# Patient Record
Sex: FEMALE | Race: WHITE | NOT HISPANIC OR LATINO | Employment: OTHER | URBAN - NONMETROPOLITAN AREA
[De-identification: names, ages, dates, MRNs, and addresses within clinical notes are randomized per-mention and may not be internally consistent; named-entity substitution may affect disease eponyms.]

---

## 2019-04-04 ENCOUNTER — PREP FOR SURGERY (OUTPATIENT)
Dept: OTHER | Facility: HOSPITAL | Age: 84
End: 2019-04-04

## 2019-04-04 DIAGNOSIS — H25.811 COMBINED FORM OF AGE-RELATED CATARACT, RIGHT EYE: Primary | ICD-10-CM

## 2019-04-04 RX ORDER — PREDNISOLONE ACETATE 10 MG/ML
1 SUSPENSION/ DROPS OPHTHALMIC
Status: CANCELLED | OUTPATIENT
Start: 2019-04-08

## 2019-04-04 RX ORDER — TETRACAINE HYDROCHLORIDE 5 MG/ML
1 SOLUTION OPHTHALMIC SEE ADMIN INSTRUCTIONS
Status: CANCELLED | OUTPATIENT
Start: 2019-04-08

## 2019-04-04 RX ORDER — ALPRAZOLAM 0.25 MG/1
0.25 TABLET ORAL
COMMUNITY

## 2019-04-04 RX ORDER — PRAVASTATIN SODIUM 20 MG
20 TABLET ORAL DAILY
COMMUNITY

## 2019-04-04 RX ORDER — POLYMYXIN B SULFATE AND TRIMETHOPRIM 1; 10000 MG/ML; [USP'U]/ML
1 SOLUTION OPHTHALMIC
Status: CANCELLED | OUTPATIENT
Start: 2019-04-08 | End: 2019-04-15

## 2019-04-04 RX ORDER — CYCLOPENTOLATE HYDROCHLORIDE 20 MG/ML
1 SOLUTION/ DROPS OPHTHALMIC
Status: CANCELLED | OUTPATIENT
Start: 2019-04-08 | End: 2019-04-08

## 2019-04-04 RX ORDER — METOPROLOL TARTRATE 100 MG/1
100 TABLET ORAL 2 TIMES DAILY
COMMUNITY

## 2019-04-04 RX ORDER — LIDOCAINE 4 G/G
1 PATCH TOPICAL DAILY PRN
COMMUNITY

## 2019-04-04 RX ORDER — OMEPRAZOLE 20 MG/1
20 CAPSULE, DELAYED RELEASE ORAL DAILY
COMMUNITY

## 2019-04-04 RX ORDER — CHOLECALCIFEROL (VITAMIN D3) 50 MCG
2000 TABLET ORAL DAILY
COMMUNITY

## 2019-04-04 RX ORDER — SERTRALINE HYDROCHLORIDE 25 MG/1
25 TABLET, FILM COATED ORAL DAILY
COMMUNITY
End: 2019-09-17 | Stop reason: DRUGHIGH

## 2019-04-04 RX ORDER — PHENYLEPHRINE HYDROCHLORIDE 100 MG/ML
1 SOLUTION/ DROPS OPHTHALMIC
Status: CANCELLED | OUTPATIENT
Start: 2019-04-08 | End: 2019-04-08

## 2019-04-04 RX ORDER — ASPIRIN 81 MG/1
81 TABLET ORAL DAILY
COMMUNITY

## 2019-04-04 RX ORDER — HYDROCODONE BITARTRATE AND ACETAMINOPHEN 5; 325 MG/1; MG/1
1 TABLET ORAL 2 TIMES DAILY
COMMUNITY

## 2019-04-08 ENCOUNTER — ANESTHESIA EVENT (OUTPATIENT)
Dept: PERIOP | Facility: HOSPITAL | Age: 84
End: 2019-04-08

## 2019-04-08 ENCOUNTER — HOSPITAL ENCOUNTER (OUTPATIENT)
Facility: HOSPITAL | Age: 84
Setting detail: HOSPITAL OUTPATIENT SURGERY
Discharge: HOME | End: 2019-04-08
Attending: OPHTHALMOLOGY | Admitting: OPHTHALMOLOGY

## 2019-04-08 ENCOUNTER — ANESTHESIA (OUTPATIENT)
Dept: PERIOP | Facility: HOSPITAL | Age: 84
End: 2019-04-08

## 2019-04-08 VITALS
HEIGHT: 63 IN | OXYGEN SATURATION: 98 % | HEART RATE: 66 BPM | TEMPERATURE: 98 F | RESPIRATION RATE: 20 BRPM | WEIGHT: 130 LBS | SYSTOLIC BLOOD PRESSURE: 132 MMHG | DIASTOLIC BLOOD PRESSURE: 88 MMHG | BODY MASS INDEX: 23.04 KG/M2

## 2019-04-08 DIAGNOSIS — H25.811 COMBINED FORM OF AGE-RELATED CATARACT, RIGHT EYE: ICD-10-CM

## 2019-04-08 PROCEDURE — 25010000002 EPINEPHRINE PER 0.1 MG: Performed by: OPHTHALMOLOGY

## 2019-04-08 PROCEDURE — V2632 POST CHMBR INTRAOCULAR LENS: HCPCS | Performed by: OPHTHALMOLOGY

## 2019-04-08 PROCEDURE — 25010000002 MIDAZOLAM PER 1 MG: Performed by: NURSE ANESTHETIST, CERTIFIED REGISTERED

## 2019-04-08 DEVICE — IMPLANTABLE DEVICE: Type: IMPLANTABLE DEVICE | Site: POSTERIOR CHAMBER | Status: FUNCTIONAL

## 2019-04-08 RX ORDER — TETRACAINE HYDROCHLORIDE 5 MG/ML
SOLUTION OPHTHALMIC AS NEEDED
Status: DISCONTINUED | OUTPATIENT
Start: 2019-04-08 | End: 2019-04-08 | Stop reason: HOSPADM

## 2019-04-08 RX ORDER — CYCLOPENTOLATE HYDROCHLORIDE 20 MG/ML
1 SOLUTION/ DROPS OPHTHALMIC
Status: COMPLETED | OUTPATIENT
Start: 2019-04-08 | End: 2019-04-08

## 2019-04-08 RX ORDER — PREDNISOLONE ACETATE 10 MG/ML
1 SUSPENSION/ DROPS OPHTHALMIC
Status: DISCONTINUED | OUTPATIENT
Start: 2019-04-08 | End: 2019-04-08

## 2019-04-08 RX ORDER — POLYMYXIN B SULFATE AND TRIMETHOPRIM 1; 10000 MG/ML; [USP'U]/ML
1 SOLUTION OPHTHALMIC
Status: DISCONTINUED | OUTPATIENT
Start: 2019-04-08 | End: 2019-04-08 | Stop reason: HOSPADM

## 2019-04-08 RX ORDER — SODIUM CHLORIDE 0.9 % (FLUSH) 0.9 %
3 SYRINGE (ML) INJECTION AS NEEDED
Status: DISCONTINUED | OUTPATIENT
Start: 2019-04-08 | End: 2019-04-08 | Stop reason: HOSPADM

## 2019-04-08 RX ORDER — MIDAZOLAM HYDROCHLORIDE 1 MG/ML
INJECTION INTRAMUSCULAR; INTRAVENOUS AS NEEDED
Status: DISCONTINUED | OUTPATIENT
Start: 2019-04-08 | End: 2019-04-08 | Stop reason: SURG

## 2019-04-08 RX ORDER — PHENYLEPHRINE HYDROCHLORIDE 100 MG/ML
1 SOLUTION/ DROPS OPHTHALMIC
Status: COMPLETED | OUTPATIENT
Start: 2019-04-08 | End: 2019-04-08

## 2019-04-08 RX ORDER — PREDNISOLONE ACETATE 10 MG/ML
1 SUSPENSION/ DROPS OPHTHALMIC
Status: DISCONTINUED | OUTPATIENT
Start: 2019-04-08 | End: 2019-04-08 | Stop reason: HOSPADM

## 2019-04-08 RX ORDER — TETRACAINE HYDROCHLORIDE 5 MG/ML
1 SOLUTION OPHTHALMIC SEE ADMIN INSTRUCTIONS
Status: COMPLETED | OUTPATIENT
Start: 2019-04-08 | End: 2019-04-08

## 2019-04-08 RX ORDER — SODIUM CHLORIDE, SODIUM LACTATE, POTASSIUM CHLORIDE, CALCIUM CHLORIDE 600; 310; 30; 20 MG/100ML; MG/100ML; MG/100ML; MG/100ML
1000 INJECTION, SOLUTION INTRAVENOUS CONTINUOUS
Status: DISCONTINUED | OUTPATIENT
Start: 2019-04-08 | End: 2019-04-08 | Stop reason: HOSPADM

## 2019-04-08 RX ORDER — BALANCED SALT SOLUTION ENRICHED WITH BICARBONATE, DEXTROSE, AND GLUTATHIONE
KIT INTRAOCULAR AS NEEDED
Status: DISCONTINUED | OUTPATIENT
Start: 2019-04-08 | End: 2019-04-08 | Stop reason: HOSPADM

## 2019-04-08 RX ORDER — KETAMINE HCL IN NACL, ISO-OSM 100MG/10ML
SYRINGE (ML) INJECTION AS NEEDED
Status: DISCONTINUED | OUTPATIENT
Start: 2019-04-08 | End: 2019-04-08 | Stop reason: SURG

## 2019-04-08 RX ORDER — POLYMYXIN B SULFATE AND TRIMETHOPRIM 1; 10000 MG/ML; [USP'U]/ML
SOLUTION OPHTHALMIC AS NEEDED
Status: DISCONTINUED | OUTPATIENT
Start: 2019-04-08 | End: 2019-04-08 | Stop reason: HOSPADM

## 2019-04-08 RX ORDER — PREDNISOLONE ACETATE 10 MG/ML
SUSPENSION/ DROPS OPHTHALMIC AS NEEDED
Status: DISCONTINUED | OUTPATIENT
Start: 2019-04-08 | End: 2019-04-08 | Stop reason: HOSPADM

## 2019-04-08 RX ADMIN — MIDAZOLAM HYDROCHLORIDE 0.5 MG: 1 INJECTION, SOLUTION INTRAMUSCULAR; INTRAVENOUS at 08:25

## 2019-04-08 RX ADMIN — PHENYLEPHRINE HYDROCHLORIDE 1 DROP: 100 SOLUTION/ DROPS OPHTHALMIC at 08:06

## 2019-04-08 RX ADMIN — PHENYLEPHRINE HYDROCHLORIDE 1 DROP: 100 SOLUTION/ DROPS OPHTHALMIC at 08:01

## 2019-04-08 RX ADMIN — CYCLOPENTOLATE HYDROCHLORIDE 1 DROP: 20 SOLUTION/ DROPS OPHTHALMIC at 08:01

## 2019-04-08 RX ADMIN — TETRACAINE HYDROCHLORIDE 1 DROP: 5 SOLUTION OPHTHALMIC at 07:55

## 2019-04-08 RX ADMIN — TETRACAINE HYDROCHLORIDE 1 DROP: 5 SOLUTION OPHTHALMIC at 07:54

## 2019-04-08 RX ADMIN — SODIUM CHLORIDE, POTASSIUM CHLORIDE, SODIUM LACTATE AND CALCIUM CHLORIDE 1000 ML: 600; 310; 30; 20 INJECTION, SOLUTION INTRAVENOUS at 08:01

## 2019-04-08 RX ADMIN — TETRACAINE HYDROCHLORIDE 1 DROP: 5 SOLUTION OPHTHALMIC at 07:53

## 2019-04-08 RX ADMIN — MIDAZOLAM HYDROCHLORIDE 0.5 MG: 1 INJECTION, SOLUTION INTRAMUSCULAR; INTRAVENOUS at 08:37

## 2019-04-08 RX ADMIN — PHENYLEPHRINE HYDROCHLORIDE 1 DROP: 100 SOLUTION/ DROPS OPHTHALMIC at 07:56

## 2019-04-08 RX ADMIN — CYCLOPENTOLATE HYDROCHLORIDE 1 DROP: 20 SOLUTION/ DROPS OPHTHALMIC at 08:06

## 2019-04-08 RX ADMIN — Medication 6 MG: at 08:28

## 2019-04-08 RX ADMIN — CYCLOPENTOLATE HYDROCHLORIDE 1 DROP: 20 SOLUTION/ DROPS OPHTHALMIC at 07:56

## 2019-04-08 RX ADMIN — MIDAZOLAM HYDROCHLORIDE 0.5 MG: 1 INJECTION, SOLUTION INTRAMUSCULAR; INTRAVENOUS at 08:10

## 2019-04-08 RX ADMIN — MIDAZOLAM HYDROCHLORIDE 0.5 MG: 1 INJECTION, SOLUTION INTRAMUSCULAR; INTRAVENOUS at 08:44

## 2019-04-08 NOTE — ANESTHESIA PREPROCEDURE EVALUATION
Anesthesia Evaluation     Patient summary reviewed and Nursing notes reviewed   no history of anesthetic complications:  NPO Solid Status: > 8 hours  NPO Liquid Status: > 8 hours           Airway   Mallampati: II  TM distance: >3 FB  Neck ROM: full  No difficulty expected  Dental    (+) lower dentures and upper dentures    Pulmonary    (+) COPD, sleep apnea, decreased breath sounds,   (-) not a smoker    ROS comment: o2 at night  Cardiovascular     (+) dysrhythmias Atrial Fib,       Neuro/Psych  (+) psychiatric history Anxiety and Depression,     GI/Hepatic/Renal/Endo    (+)  GERD,      Musculoskeletal     (+) arthralgias, back pain, chronic pain, myalgias,   Abdominal    Substance History      OB/GYN          Other   (+) arthritis     ROS/Med Hx Other: 02 at night                  Anesthesia Plan    ASA 3     MAC     intravenous induction   Anesthetic plan, all risks, benefits, and alternatives have been provided, discussed and informed consent has been obtained with: patient.

## 2019-04-08 NOTE — ANESTHESIA POSTPROCEDURE EVALUATION
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 [H25.813])    Surgeon:  Cindy Olsen MD Provider:  Forest Blanton CRNA    Anesthesia Type:  MAC ASA Status:  3          Anesthesia Type: MAC  Last vitals  BP   157/99 (04/08/19 0745)   Temp   98.1 °F (36.7 °C) (04/08/19 0745)   Pulse   74 (04/08/19 0745)   Resp   17 (04/08/19 0745)     SpO2   96 % (04/08/19 0745)     Post Anesthesia Care and Evaluation    Patient location during evaluation: PHASE II  Patient participation: complete - patient participated  Level of consciousness: awake  Pain score: 0  Pain management: adequate  Airway patency: patent  Anesthetic complications: No anesthetic complications  PONV Status: none  Cardiovascular status: acceptable  Respiratory status: acceptable  Hydration status: acceptable    Comments: vsss resp spont, reflexes intact, responsive, report given to pacu nurse

## 2019-06-17 ENCOUNTER — HOSPITAL ENCOUNTER (OUTPATIENT)
Facility: HOSPITAL | Age: 84
Setting detail: HOSPITAL OUTPATIENT SURGERY
Discharge: HOME | End: 2019-06-17
Attending: OPHTHALMOLOGY | Admitting: OPHTHALMOLOGY

## 2019-06-17 ENCOUNTER — ANESTHESIA EVENT (OUTPATIENT)
Dept: PERIOP | Facility: HOSPITAL | Age: 84
End: 2019-06-17

## 2019-06-17 ENCOUNTER — ANESTHESIA (OUTPATIENT)
Dept: PERIOP | Facility: HOSPITAL | Age: 84
End: 2019-06-17

## 2019-06-17 VITALS
OXYGEN SATURATION: 95 % | BODY MASS INDEX: 21 KG/M2 | RESPIRATION RATE: 18 BRPM | HEIGHT: 64 IN | WEIGHT: 123 LBS | HEART RATE: 78 BPM | DIASTOLIC BLOOD PRESSURE: 123 MMHG | SYSTOLIC BLOOD PRESSURE: 211 MMHG | TEMPERATURE: 97.9 F

## 2019-06-17 PROCEDURE — 25010000002 EPINEPHRINE PER 0.1 MG: Performed by: OPHTHALMOLOGY

## 2019-06-17 PROCEDURE — 25010000002 MIDAZOLAM PER 1 MG: Performed by: NURSE ANESTHETIST, CERTIFIED REGISTERED

## 2019-06-17 PROCEDURE — V2632 POST CHMBR INTRAOCULAR LENS: HCPCS | Performed by: OPHTHALMOLOGY

## 2019-06-17 DEVICE — IMPLANTABLE DEVICE: Type: IMPLANTABLE DEVICE | Site: POSTERIOR CHAMBER | Status: FUNCTIONAL

## 2019-06-17 RX ORDER — PREDNISOLONE ACETATE 10 MG/ML
SUSPENSION/ DROPS OPHTHALMIC AS NEEDED
Status: DISCONTINUED | OUTPATIENT
Start: 2019-06-17 | End: 2019-06-17 | Stop reason: HOSPADM

## 2019-06-17 RX ORDER — TETRACAINE HYDROCHLORIDE 5 MG/ML
SOLUTION OPHTHALMIC AS NEEDED
Status: DISCONTINUED | OUTPATIENT
Start: 2019-06-17 | End: 2019-06-17 | Stop reason: HOSPADM

## 2019-06-17 RX ORDER — MIDAZOLAM HYDROCHLORIDE 1 MG/ML
INJECTION INTRAMUSCULAR; INTRAVENOUS AS NEEDED
Status: DISCONTINUED | OUTPATIENT
Start: 2019-06-17 | End: 2019-06-17 | Stop reason: SURG

## 2019-06-17 RX ORDER — POLYMYXIN B SULFATE AND TRIMETHOPRIM 1; 10000 MG/ML; [USP'U]/ML
SOLUTION OPHTHALMIC AS NEEDED
Status: DISCONTINUED | OUTPATIENT
Start: 2019-06-17 | End: 2019-06-17 | Stop reason: HOSPADM

## 2019-06-17 RX ORDER — POLYMYXIN B SULFATE AND TRIMETHOPRIM 1; 10000 MG/ML; [USP'U]/ML
1 SOLUTION OPHTHALMIC
Status: DISCONTINUED | OUTPATIENT
Start: 2019-06-17 | End: 2019-06-17 | Stop reason: HOSPADM

## 2019-06-17 RX ORDER — PHENYLEPHRINE HYDROCHLORIDE 100 MG/ML
1 SOLUTION/ DROPS OPHTHALMIC
Status: COMPLETED | OUTPATIENT
Start: 2019-06-17 | End: 2019-06-17

## 2019-06-17 RX ORDER — TETRACAINE HYDROCHLORIDE 5 MG/ML
1 SOLUTION OPHTHALMIC SEE ADMIN INSTRUCTIONS
Status: COMPLETED | OUTPATIENT
Start: 2019-06-17 | End: 2019-06-17

## 2019-06-17 RX ORDER — PREDNISOLONE ACETATE 10 MG/ML
1 SUSPENSION/ DROPS OPHTHALMIC
Status: DISCONTINUED | OUTPATIENT
Start: 2019-06-17 | End: 2019-06-17 | Stop reason: HOSPADM

## 2019-06-17 RX ORDER — BALANCED SALT SOLUTION ENRICHED WITH BICARBONATE, DEXTROSE, AND GLUTATHIONE
KIT INTRAOCULAR AS NEEDED
Status: DISCONTINUED | OUTPATIENT
Start: 2019-06-17 | End: 2019-06-17 | Stop reason: HOSPADM

## 2019-06-17 RX ORDER — CYCLOPENTOLATE HYDROCHLORIDE 20 MG/ML
1 SOLUTION/ DROPS OPHTHALMIC
Status: COMPLETED | OUTPATIENT
Start: 2019-06-17 | End: 2019-06-17

## 2019-06-17 RX ORDER — SODIUM CHLORIDE 0.9 % (FLUSH) 0.9 %
3 SYRINGE (ML) INJECTION AS NEEDED
Status: DISCONTINUED | OUTPATIENT
Start: 2019-06-17 | End: 2019-06-17 | Stop reason: HOSPADM

## 2019-06-17 RX ORDER — SODIUM CHLORIDE, SODIUM LACTATE, POTASSIUM CHLORIDE, CALCIUM CHLORIDE 600; 310; 30; 20 MG/100ML; MG/100ML; MG/100ML; MG/100ML
1000 INJECTION, SOLUTION INTRAVENOUS CONTINUOUS
Status: DISCONTINUED | OUTPATIENT
Start: 2019-06-17 | End: 2019-06-17 | Stop reason: HOSPADM

## 2019-06-17 RX ORDER — POLYMYXIN B SULFATE AND TRIMETHOPRIM 1; 10000 MG/ML; [USP'U]/ML
1 SOLUTION OPHTHALMIC
Status: DISCONTINUED | OUTPATIENT
Start: 2019-06-17 | End: 2019-06-17

## 2019-06-17 RX ADMIN — TETRACAINE HYDROCHLORIDE 1 DROP: 5 SOLUTION OPHTHALMIC at 09:21

## 2019-06-17 RX ADMIN — TETRACAINE HYDROCHLORIDE 1 DROP: 5 SOLUTION OPHTHALMIC at 09:19

## 2019-06-17 RX ADMIN — SODIUM CHLORIDE, POTASSIUM CHLORIDE, SODIUM LACTATE AND CALCIUM CHLORIDE 1000 ML: 600; 310; 30; 20 INJECTION, SOLUTION INTRAVENOUS at 09:32

## 2019-06-17 RX ADMIN — TETRACAINE HYDROCHLORIDE 1 DROP: 5 SOLUTION OPHTHALMIC at 09:20

## 2019-06-17 RX ADMIN — MIDAZOLAM HYDROCHLORIDE 2 MG: 1 INJECTION, SOLUTION INTRAMUSCULAR; INTRAVENOUS at 10:12

## 2019-06-17 RX ADMIN — PHENYLEPHRINE HYDROCHLORIDE 1 DROP: 100 SOLUTION/ DROPS OPHTHALMIC at 09:27

## 2019-06-17 RX ADMIN — CYCLOPENTOLATE HYDROCHLORIDE 1 DROP: 20 SOLUTION/ DROPS OPHTHALMIC at 09:27

## 2019-06-17 RX ADMIN — CYCLOPENTOLATE HYDROCHLORIDE 1 DROP: 20 SOLUTION/ DROPS OPHTHALMIC at 09:22

## 2019-06-17 RX ADMIN — CYCLOPENTOLATE HYDROCHLORIDE 1 DROP: 20 SOLUTION/ DROPS OPHTHALMIC at 09:32

## 2019-06-17 RX ADMIN — PHENYLEPHRINE HYDROCHLORIDE 1 DROP: 100 SOLUTION/ DROPS OPHTHALMIC at 09:32

## 2019-06-17 RX ADMIN — PHENYLEPHRINE HYDROCHLORIDE 1 DROP: 100 SOLUTION/ DROPS OPHTHALMIC at 09:22

## 2019-06-17 NOTE — DISCHARGE INSTRUCTIONS
Please use each eye drop every 4 hours , one drop to the eye.  Start eye drops when you get home today.  Wait 5 minutes between drops.  It does not matter which eye drop goes first.    You do not have to get up at night time to put drops in the eye.  Continue drops in the morning every 4 hours until your appointment with Dr. Olsen tomorrow.  Bring the blue eye case and folder with you to your appointment.    Keep the shield on all day and night for the first 24 hours.  Do not rub or touch eye.  Wear the shield at bedtime for the first week.        Please follow all post op instructions and follow up appointment time from your physician's office included in your discharge packet.  .   No pushing, pulling, tugging,  heavy lifting, or strenuous activity.  No major decision making, driving, or drinking alcoholic beverages for 24 hours. ( due to the medications you have  received)  Always use good hand hygiene/washing techniques.  NO driving while taking pain medications.    To assist you in voiding:  Drink plenty of fluids  Listen to running water while attempting to void.    If you are unable to urinate and you have an uncomfortable urge to void or it has been   6 hours since you were discharged, return to the Emergency Room

## 2019-06-17 NOTE — OP NOTE
PROCEDURE NOTE      Date of Procedure: 6/17/2019  Patient Name:  Tunde Barrow  MRN: [id number]  YOB: 1931      PREOPERATIVE DIAGNOSIS:  Visually significant combined form of senile cataract of the Left eye interfering with activities of daily living.    INDICATIONS FOR THE PROCEDURE:  Visually significant combined form of senile cataract of the Left eye interfering with activities of daily living.    POSTOPERATIVE DIAGNOSIS:  Visually significant combined form of senile cataract of the Left eye interfering with activities of daily living.    SURGEON:  Cindy Olsen M.D.    NAME OF PROCEDURE:  Left cataract extraction with posterior chamber intraocular lens implant. Lens used: +24.5D MN60AC  CDE: 9.71    ANESTHESIA:  Topical with MAC.    COMPLICATIONS:  None.    DETAILS OF THE PROCEDURE:  After receiving appropriate informed consent and confirming and marking the eye to be operated upon, the patient was wheeled into the operating room. After confirming adequate anesthesia, the patient was prepped and draped in a standard sterile ophthalmic fashion. A lid speculum was then placed in the eye.  A 0.8 mm metal blade was then used to make two limbal paracenteses and the anterior chamber was reformed with Viscoat.  A 2.4 mm metal keratome was then used to make a temporal clear cornea tunneled incision.  A cystotome was used to puncture the anterior capsule and initiate a capsular flap.  Utrata forceps were used to complete a continuous curvilinear capsulorrhexis.  BSS on a Tavarez hydrodissection cannula was then used to hydrodissect and hydrodelineate the nucleus.  The nucleus was then phacoemulsified using a divide and conquer technique secondary to extensive eye movement.  CDE was 9.71%.  Remaining cortex was removed with bimanual I/A.  Posterior capsular polish was performed.  The capsular bag was then reformed with Healon-GV and a +24.5 D MN60AC lens implant, was then placed in the bag without event.  The  Healon-GV was then removed with bimanual irrigation and aspiration and the anterior chamber reformed with BSS.  The wounds were hydrated as necessary to maintain a water tight anterior chamber. Intracameral Moxifloxacin 1mg/0.1ml was administered and 5% povidone iodine solution was placed on the ocular surface. At the conclusion of the procedure, the lid speculum was removed and the patient undraped.  A drop of Polytrim and Pred Forte 1% and shield were placed over the eye.  The patient left the room in good condition having tolerated the procedure well.    Cindy Olsen MD

## 2019-06-17 NOTE — NURSING NOTE
"1108 late entry.  Pt has elevated BP both pre operatively and post operatively.  Patient denies s/s of HTN. Pt instructed to return to ER if ringing in the ears, dizziness, visual disturbances or redness of face occur.  Pt v/u.  Patient reports blood pressure will probably \"settle down\" when leg cramps lessen. Pt repositioned for comfort.  "

## 2019-06-17 NOTE — ANESTHESIA PREPROCEDURE EVALUATION
Anesthesia Evaluation     Patient summary reviewed and Nursing notes reviewed   no history of anesthetic complications:  NPO Solid Status: > 8 hours  NPO Liquid Status: > 8 hours           Airway   Mallampati: II  TM distance: >3 FB  Neck ROM: full  No difficulty expected  Dental    (+) lower dentures and upper dentures    Pulmonary    (+) COPD, sleep apnea, decreased breath sounds,   (-) not a smoker    ROS comment: o2 at night  Cardiovascular   Exercise tolerance: good (4-7 METS)    ECG reviewed  PT is on anticoagulation therapy  Patient on routine beta blocker and Beta blocker given within 24 hours of surgery    (+) dysrhythmias Atrial Fib,     ROS comment: ECG 3/2014 SR with 1st degree AV block    Neuro/Psych  (+) psychiatric history Anxiety and Depression,     GI/Hepatic/Renal/Endo    (+)  GERD,      Musculoskeletal     (+) arthralgias, back pain, chronic pain, myalgias,   Abdominal    Substance History - negative use     OB/GYN negative ob/gyn ROS   (-)  Pregnant    Comment: Hysterectomy      Other   (+) arthritis     ROS/Med Hx Other: 02 at night    Pt on ELIQUIS AND ASA 81 mg last dose 6/16/2019 at 2200                  Anesthesia Plan    ASA 3     MAC   (Pt advised that intravenous sedation will be used as primary anesthetic technique, along with topical anesthesia. Every effort will be made to make sure patient is comfortable.     The patient was told that they may experience recall for the procedure. Pt verbalized understanding and agrees to plan of care.)  intravenous induction   Anesthetic plan, all risks, benefits, and alternatives have been provided, discussed and informed consent has been obtained with: patient.    Plan discussed with CRNA.

## 2019-06-17 NOTE — ANESTHESIA POSTPROCEDURE EVALUATION
Patient: Tunde Barrow    Procedure Summary     Date:  06/17/19 Room / Location:  [facility] OR  / [facility] OR    Anesthesia Start:  0955 Anesthesia Stop:  1022    Procedure:  CATARACT PHACO EXTRACTION WITH INTRAOCULAR LENS IMPLANT LEFT EYE (Left Eye) Diagnosis:       Combined forms of age-related cataract of left eye      (Combined forms of age-related cataract of left eye [H25.812])    Surgeon:  Cindy Olsen MD Provider:  Andrea Burks CRNA    Anesthesia Type:  MAC ASA Status:  3          Anesthesia Type: MAC  Last vitals  BP   (!) 211/123 (06/17/19 1058)   Temp   97.9 °F (36.6 °C) (06/17/19 1028)   Pulse   78 (06/17/19 1058)   Resp   18 (06/17/19 1058)     SpO2   95 % (06/17/19 1058)     Post Anesthesia Care and Evaluation    Patient location during evaluation: bedside  Patient participation: complete - patient participated  Level of consciousness: awake  Pain score: 0  Pain management: adequate  Airway patency: patent  Anesthetic complications: No anesthetic complications  PONV Status: controlled  Cardiovascular status: acceptable and stable  Respiratory status: acceptable and room air  Hydration status: acceptable

## 2019-09-17 ENCOUNTER — OFFICE VISIT (OUTPATIENT)
Dept: NEUROSURGERY | Facility: CLINIC | Age: 84
End: 2019-09-17

## 2019-09-17 VITALS — HEIGHT: 64 IN | BODY MASS INDEX: 21 KG/M2 | WEIGHT: 123 LBS

## 2019-09-17 DIAGNOSIS — M47.816 FACET ARTHRITIS OF LUMBAR REGION: ICD-10-CM

## 2019-09-17 DIAGNOSIS — M51.36 DDD (DEGENERATIVE DISC DISEASE), LUMBAR: ICD-10-CM

## 2019-09-17 DIAGNOSIS — M41.20 SCOLIOSIS (AND KYPHOSCOLIOSIS), IDIOPATHIC: Primary | ICD-10-CM

## 2019-09-17 PROBLEM — M48.062 SPINAL STENOSIS, LUMBAR REGION, WITH NEUROGENIC CLAUDICATION: Status: ACTIVE | Noted: 2019-09-17

## 2019-09-17 PROCEDURE — 99203 OFFICE O/P NEW LOW 30 MIN: CPT | Performed by: NEUROLOGICAL SURGERY

## 2019-09-17 NOTE — PROGRESS NOTES
Subjective   Patient ID: Tunde Barrow is a 88 y.o. female is being seen for consultation today at the request of Lorna Hagen*  Chief Complaint: Back pain     History of Present Illness: The patient is an 88-year-old woman from [place] who lives by herself with her daughter nearby to help as needed and is bothered by a chronic back pain syndrome that has progressively worsened over years of time.  It now bothers her particularly when she has to stand such as doing dishes at his sink, and is relieved by sitting.  She has had no special treatment for the symptoms.  She past history of heart disease and kidney disease still porosis and arthritis.  She has had only one surgery in the past which was a hysterectomy.  She is .  Her daughter is with her today.    Review of Radiographic Studies:  Lumbar MRI scan dated 8/28/2019 shows degenerative lumbar scoliosis, with lumbar stenosis moderately severe at L4-5, moderate at L3-4, and mild at L2-3, mostly with a right-sided lateral recess stenosis at that L2-3 level.    The following portions of the patient's history were reviewed, updated as appropriate and approved: allergies, current medications, past family history, past medical history, past social history, past surgical history, review of systems and problem list.    Review of Systems   Constitutional: Negative for activity change, appetite change, chills, diaphoresis, fatigue, fever and unexpected weight change.   HENT: Negative for congestion, dental problem, drooling, ear discharge, ear pain, facial swelling, hearing loss, mouth sores, nosebleeds, postnasal drip, rhinorrhea, sinus pressure, sneezing, sore throat, tinnitus, trouble swallowing and voice change.    Eyes: Negative for photophobia, pain, discharge, redness, itching and visual disturbance.   Respiratory: Negative for apnea, cough, choking, chest tightness, shortness of breath, wheezing and stridor.    Cardiovascular: Negative for chest  pain, palpitations and leg swelling.   Gastrointestinal: Negative for abdominal distention, abdominal pain, anal bleeding, blood in stool, constipation, diarrhea, nausea, rectal pain and vomiting.   Endocrine: Negative for cold intolerance, heat intolerance, polydipsia, polyphagia and polyuria.   Genitourinary: Negative for decreased urine volume, difficulty urinating, dysuria, enuresis, flank pain, frequency, genital sores, hematuria and urgency.   Musculoskeletal: Positive for back pain and neck pain. Negative for arthralgias, gait problem, joint swelling, myalgias and neck stiffness.   Skin: Negative for color change, pallor, rash and wound.   Allergic/Immunologic: Negative for environmental allergies, food allergies and immunocompromised state.   Neurological: Positive for numbness and headaches. Negative for dizziness, tremors, seizures, syncope, facial asymmetry, speech difficulty, weakness and light-headedness.   Hematological: Negative for adenopathy. Does not bruise/bleed easily.   Psychiatric/Behavioral: Negative for agitation, behavioral problems, confusion, decreased concentration, dysphoric mood, hallucinations, self-injury, sleep disturbance and suicidal ideas. The patient is not nervous/anxious and is not hyperactive.        Objective     NEUROLOGICAL EXAMINATION:      MENTAL STATUS:  Alert and oriented.  Speech intact.  Recent and remote memory intact.      CRANIAL NERVES:  Cranial nerve II:  Visual fields are full.  Cranial nerves III, IV and VI:  PERRLADC.  Extraocular movements are intact.  Nystagmus is not present.  Cranial nerve V:  Facial sensation is intact.  Cranial nerve VII:  Muscles of facial expression reveal no asymmetry.  Cranial nerve VIII:  Hearing is intact.  Cranial nerves IX and X:  Palate elevates symmetrically.  Cranial nerve XI:  Shoulder shrug is intact.  Cranial nerve XII:  Tongue is midline without evidence of atrophy or fasciculation.    MUSCULOSKELETAL:  SLR negative  bilaterally    MOTOR: Intact knee extension, ankle dorsiflexion, and plantar flexion    SENSATION: Intact to touch over the lower extremities.    REFLEXES:  DTR 2+ both knees and both ankles.      Assessment   Lumbar stenosis moderately severe L4-5, moderate L3-4, mild with lateral recess on the right at L2-3.       Plan   Physical therapy in [place].  Schedule epidural steroid injection in [place] with Dr. Aguiar.  Follow-up in [place] in 2 months.       Balaji Irvin MD

## 2020-01-01 ENCOUNTER — APPOINTMENT (OUTPATIENT)
Dept: GENERAL RADIOLOGY | Facility: HOSPITAL | Age: 85
End: 2020-01-01

## 2020-01-01 ENCOUNTER — ANESTHESIA EVENT (OUTPATIENT)
Dept: PERIOP | Facility: HOSPITAL | Age: 85
End: 2020-01-01

## 2020-01-01 ENCOUNTER — TELEPHONE (OUTPATIENT)
Dept: SURGERY | Facility: CLINIC | Age: 85
End: 2020-01-01

## 2020-01-01 ENCOUNTER — ANESTHESIA (OUTPATIENT)
Dept: PERIOP | Facility: HOSPITAL | Age: 85
End: 2020-01-01

## 2020-01-01 ENCOUNTER — APPOINTMENT (OUTPATIENT)
Dept: ULTRASOUND IMAGING | Facility: HOSPITAL | Age: 85
End: 2020-01-01

## 2020-01-01 ENCOUNTER — APPOINTMENT (OUTPATIENT)
Dept: CT IMAGING | Facility: HOSPITAL | Age: 85
End: 2020-01-01

## 2020-01-01 ENCOUNTER — HOSPITAL ENCOUNTER (INPATIENT)
Facility: HOSPITAL | Age: 85
LOS: 11 days | End: 2020-11-15
Attending: EMERGENCY MEDICINE | Admitting: INTERNAL MEDICINE

## 2020-01-01 VITALS
HEIGHT: 65 IN | BODY MASS INDEX: 25.09 KG/M2 | OXYGEN SATURATION: 88 % | DIASTOLIC BLOOD PRESSURE: 56 MMHG | WEIGHT: 150.57 LBS | SYSTOLIC BLOOD PRESSURE: 97 MMHG | RESPIRATION RATE: 23 BRPM | TEMPERATURE: 98.8 F

## 2020-01-01 DIAGNOSIS — E87.20 LACTIC ACIDOSIS: ICD-10-CM

## 2020-01-01 DIAGNOSIS — K56.609 BOWEL OBSTRUCTION (HCC): ICD-10-CM

## 2020-01-01 DIAGNOSIS — K56.609 SMALL BOWEL OBSTRUCTION (HCC): Primary | ICD-10-CM

## 2020-01-01 DIAGNOSIS — N28.9 RENAL INSUFFICIENCY: ICD-10-CM

## 2020-01-01 LAB
ABO GROUP BLD: NORMAL
ALBUMIN SERPL-MCNC: 2.7 G/DL (ref 3.5–5.2)
ALBUMIN SERPL-MCNC: 2.8 G/DL (ref 3.5–5.2)
ALBUMIN SERPL-MCNC: 3 G/DL (ref 3.5–5.2)
ALBUMIN SERPL-MCNC: 4.4 G/DL (ref 3.5–5.2)
ALBUMIN/GLOB SERPL: 1 G/DL
ALBUMIN/GLOB SERPL: 1.1 G/DL
ALBUMIN/GLOB SERPL: 1.2 G/DL
ALBUMIN/GLOB SERPL: 1.4 G/DL
ALP SERPL-CCNC: 109 U/L (ref 39–117)
ALP SERPL-CCNC: 121 U/L (ref 39–117)
ALP SERPL-CCNC: 138 U/L (ref 39–117)
ALP SERPL-CCNC: 94 U/L (ref 39–117)
ALT SERPL W P-5'-P-CCNC: 12 U/L (ref 1–33)
ALT SERPL W P-5'-P-CCNC: 13 U/L (ref 1–33)
ALT SERPL W P-5'-P-CCNC: 14 U/L (ref 1–33)
ALT SERPL W P-5'-P-CCNC: 15 U/L (ref 1–33)
ANION GAP SERPL CALCULATED.3IONS-SCNC: 10.4 MMOL/L (ref 5–15)
ANION GAP SERPL CALCULATED.3IONS-SCNC: 11.2 MMOL/L (ref 5–15)
ANION GAP SERPL CALCULATED.3IONS-SCNC: 11.2 MMOL/L (ref 5–15)
ANION GAP SERPL CALCULATED.3IONS-SCNC: 11.7 MMOL/L (ref 5–15)
ANION GAP SERPL CALCULATED.3IONS-SCNC: 13 MMOL/L (ref 5–15)
ANION GAP SERPL CALCULATED.3IONS-SCNC: 13.7 MMOL/L (ref 5–15)
ANION GAP SERPL CALCULATED.3IONS-SCNC: 14.7 MMOL/L (ref 5–15)
ANION GAP SERPL CALCULATED.3IONS-SCNC: 18.8 MMOL/L (ref 5–15)
ANION GAP SERPL CALCULATED.3IONS-SCNC: 8 MMOL/L (ref 5–15)
ANION GAP SERPL CALCULATED.3IONS-SCNC: 8.3 MMOL/L (ref 5–15)
ANION GAP SERPL CALCULATED.3IONS-SCNC: 9 MMOL/L (ref 5–15)
AST SERPL-CCNC: 18 U/L (ref 1–32)
AST SERPL-CCNC: 19 U/L (ref 1–32)
AST SERPL-CCNC: 21 U/L (ref 1–32)
AST SERPL-CCNC: 23 U/L (ref 1–32)
BACTERIA UR QL AUTO: ABNORMAL /HPF
BASOPHILS # BLD AUTO: 0.02 10*3/MM3 (ref 0–0.2)
BASOPHILS # BLD AUTO: 0.03 10*3/MM3 (ref 0–0.2)
BASOPHILS # BLD AUTO: 0.04 10*3/MM3 (ref 0–0.2)
BASOPHILS # BLD AUTO: 0.06 10*3/MM3 (ref 0–0.2)
BASOPHILS # BLD AUTO: 0.08 10*3/MM3 (ref 0–0.2)
BASOPHILS NFR BLD AUTO: 0.2 % (ref 0–1.5)
BASOPHILS NFR BLD AUTO: 0.5 % (ref 0–1.5)
BASOPHILS NFR BLD AUTO: 0.5 % (ref 0–1.5)
BH BB BLOOD EXPIRATION DATE: NORMAL
BH BB BLOOD EXPIRATION DATE: NORMAL
BH BB BLOOD TYPE BARCODE: 5100
BH BB BLOOD TYPE BARCODE: 5100
BH BB DISPENSE STATUS: NORMAL
BH BB DISPENSE STATUS: NORMAL
BH BB PRODUCT CODE: NORMAL
BH BB PRODUCT CODE: NORMAL
BH BB UNIT NUMBER: NORMAL
BH BB UNIT NUMBER: NORMAL
BILIRUB SERPL-MCNC: 0.6 MG/DL (ref 0–1.2)
BILIRUB SERPL-MCNC: 0.7 MG/DL (ref 0–1.2)
BILIRUB SERPL-MCNC: 0.7 MG/DL (ref 0–1.2)
BILIRUB SERPL-MCNC: 1 MG/DL (ref 0–1.2)
BILIRUB UR QL STRIP: NEGATIVE
BLD GP AB SCN SERPL QL: NEGATIVE
BUN SERPL-MCNC: 37 MG/DL (ref 8–23)
BUN SERPL-MCNC: 38 MG/DL (ref 8–23)
BUN SERPL-MCNC: 39 MG/DL (ref 8–23)
BUN SERPL-MCNC: 41 MG/DL (ref 8–23)
BUN SERPL-MCNC: 44 MG/DL (ref 8–23)
BUN SERPL-MCNC: 45 MG/DL (ref 8–23)
BUN SERPL-MCNC: 46 MG/DL (ref 8–23)
BUN SERPL-MCNC: 47 MG/DL (ref 8–23)
BUN SERPL-MCNC: 48 MG/DL (ref 8–23)
BUN SERPL-MCNC: 50 MG/DL (ref 8–23)
BUN SERPL-MCNC: 58 MG/DL (ref 8–23)
BUN/CREAT SERPL: 22.6 (ref 7–25)
BUN/CREAT SERPL: 22.7 (ref 7–25)
BUN/CREAT SERPL: 27.6 (ref 7–25)
BUN/CREAT SERPL: 29.3 (ref 7–25)
BUN/CREAT SERPL: 32.3 (ref 7–25)
BUN/CREAT SERPL: 41.2 (ref 7–25)
BUN/CREAT SERPL: 43.7 (ref 7–25)
BUN/CREAT SERPL: 45.7 (ref 7–25)
BUN/CREAT SERPL: 50 (ref 7–25)
BUN/CREAT SERPL: 50.6 (ref 7–25)
BUN/CREAT SERPL: 51.3 (ref 7–25)
CALCIUM SPEC-SCNC: 10.1 MG/DL (ref 8.6–10.5)
CALCIUM SPEC-SCNC: 10.3 MG/DL (ref 8.6–10.5)
CALCIUM SPEC-SCNC: 10.5 MG/DL (ref 8.6–10.5)
CALCIUM SPEC-SCNC: 8.4 MG/DL (ref 8.6–10.5)
CALCIUM SPEC-SCNC: 8.6 MG/DL (ref 8.6–10.5)
CALCIUM SPEC-SCNC: 9.1 MG/DL (ref 8.6–10.5)
CALCIUM SPEC-SCNC: 9.8 MG/DL (ref 8.6–10.5)
CALCIUM SPEC-SCNC: 9.9 MG/DL (ref 8.6–10.5)
CHLORIDE SERPL-SCNC: 101 MMOL/L (ref 98–107)
CHLORIDE SERPL-SCNC: 102 MMOL/L (ref 98–107)
CHLORIDE SERPL-SCNC: 102 MMOL/L (ref 98–107)
CHLORIDE SERPL-SCNC: 103 MMOL/L (ref 98–107)
CHLORIDE SERPL-SCNC: 104 MMOL/L (ref 98–107)
CHLORIDE SERPL-SCNC: 105 MMOL/L (ref 98–107)
CHLORIDE SERPL-SCNC: 105 MMOL/L (ref 98–107)
CHLORIDE SERPL-SCNC: 96 MMOL/L (ref 98–107)
CHLORIDE SERPL-SCNC: 99 MMOL/L (ref 98–107)
CLARITY UR: ABNORMAL
CO2 SERPL-SCNC: 19.2 MMOL/L (ref 22–29)
CO2 SERPL-SCNC: 20.6 MMOL/L (ref 22–29)
CO2 SERPL-SCNC: 21.8 MMOL/L (ref 22–29)
CO2 SERPL-SCNC: 22 MMOL/L (ref 22–29)
CO2 SERPL-SCNC: 22 MMOL/L (ref 22–29)
CO2 SERPL-SCNC: 22.3 MMOL/L (ref 22–29)
CO2 SERPL-SCNC: 22.7 MMOL/L (ref 22–29)
CO2 SERPL-SCNC: 23 MMOL/L (ref 22–29)
CO2 SERPL-SCNC: 23.8 MMOL/L (ref 22–29)
CO2 SERPL-SCNC: 26.3 MMOL/L (ref 22–29)
CO2 SERPL-SCNC: 26.3 MMOL/L (ref 22–29)
COLOR UR: YELLOW
CREAT SERPL-MCNC: 0.76 MG/DL (ref 0.57–1)
CREAT SERPL-MCNC: 0.8 MG/DL (ref 0.57–1)
CREAT SERPL-MCNC: 0.87 MG/DL (ref 0.57–1)
CREAT SERPL-MCNC: 1.03 MG/DL (ref 0.57–1)
CREAT SERPL-MCNC: 1.05 MG/DL (ref 0.57–1)
CREAT SERPL-MCNC: 1.14 MG/DL (ref 0.57–1)
CREAT SERPL-MCNC: 1.33 MG/DL (ref 0.57–1)
CREAT SERPL-MCNC: 1.34 MG/DL (ref 0.57–1)
CREAT SERPL-MCNC: 1.55 MG/DL (ref 0.57–1)
CREAT SERPL-MCNC: 2.03 MG/DL (ref 0.57–1)
CREAT SERPL-MCNC: 2.57 MG/DL (ref 0.57–1)
CROSSMATCH INTERPRETATION: NORMAL
CROSSMATCH INTERPRETATION: NORMAL
D-LACTATE SERPL-SCNC: 2.3 MMOL/L (ref 0.5–2)
D-LACTATE SERPL-SCNC: 2.5 MMOL/L (ref 0.5–2)
DEPRECATED RDW RBC AUTO: 43.8 FL (ref 37–54)
DEPRECATED RDW RBC AUTO: 44.4 FL (ref 37–54)
DEPRECATED RDW RBC AUTO: 44.7 FL (ref 37–54)
DEPRECATED RDW RBC AUTO: 45.2 FL (ref 37–54)
DEPRECATED RDW RBC AUTO: 45.4 FL (ref 37–54)
DEPRECATED RDW RBC AUTO: 45.4 FL (ref 37–54)
DEPRECATED RDW RBC AUTO: 46 FL (ref 37–54)
DEPRECATED RDW RBC AUTO: 46.1 FL (ref 37–54)
DEPRECATED RDW RBC AUTO: 46.8 FL (ref 37–54)
EOSINOPHIL # BLD AUTO: 0 10*3/MM3 (ref 0–0.4)
EOSINOPHIL # BLD AUTO: 0 10*3/MM3 (ref 0–0.4)
EOSINOPHIL # BLD AUTO: 0.01 10*3/MM3 (ref 0–0.4)
EOSINOPHIL # BLD AUTO: 0.04 10*3/MM3 (ref 0–0.4)
EOSINOPHIL # BLD AUTO: 0.2 10*3/MM3 (ref 0–0.4)
EOSINOPHIL # BLD MANUAL: 0.18 10*3/MM3 (ref 0–0.4)
EOSINOPHIL NFR BLD AUTO: 0 % (ref 0.3–6.2)
EOSINOPHIL NFR BLD AUTO: 0 % (ref 0.3–6.2)
EOSINOPHIL NFR BLD AUTO: 0.1 % (ref 0.3–6.2)
EOSINOPHIL NFR BLD AUTO: 0.3 % (ref 0.3–6.2)
EOSINOPHIL NFR BLD AUTO: 1.3 % (ref 0.3–6.2)
EOSINOPHIL NFR BLD MANUAL: 1 % (ref 0.3–6.2)
ERYTHROCYTE [DISTWIDTH] IN BLOOD BY AUTOMATED COUNT: 13 % (ref 12.3–15.4)
ERYTHROCYTE [DISTWIDTH] IN BLOOD BY AUTOMATED COUNT: 13.1 % (ref 12.3–15.4)
ERYTHROCYTE [DISTWIDTH] IN BLOOD BY AUTOMATED COUNT: 13.2 % (ref 12.3–15.4)
ERYTHROCYTE [DISTWIDTH] IN BLOOD BY AUTOMATED COUNT: 13.4 % (ref 12.3–15.4)
ERYTHROCYTE [DISTWIDTH] IN BLOOD BY AUTOMATED COUNT: 13.4 % (ref 12.3–15.4)
ERYTHROCYTE [DISTWIDTH] IN BLOOD BY AUTOMATED COUNT: 13.7 % (ref 12.3–15.4)
ERYTHROCYTE [DISTWIDTH] IN BLOOD BY AUTOMATED COUNT: 13.7 % (ref 12.3–15.4)
GFR SERPL CREATININE-BSD FRML MDRD: 18 ML/MIN/1.73
GFR SERPL CREATININE-BSD FRML MDRD: 23 ML/MIN/1.73
GFR SERPL CREATININE-BSD FRML MDRD: 31 ML/MIN/1.73
GFR SERPL CREATININE-BSD FRML MDRD: 37 ML/MIN/1.73
GFR SERPL CREATININE-BSD FRML MDRD: 38 ML/MIN/1.73
GFR SERPL CREATININE-BSD FRML MDRD: 45 ML/MIN/1.73
GFR SERPL CREATININE-BSD FRML MDRD: 49 ML/MIN/1.73
GFR SERPL CREATININE-BSD FRML MDRD: 50 ML/MIN/1.73
GFR SERPL CREATININE-BSD FRML MDRD: 61 ML/MIN/1.73
GFR SERPL CREATININE-BSD FRML MDRD: 68 ML/MIN/1.73
GFR SERPL CREATININE-BSD FRML MDRD: 72 ML/MIN/1.73
GLOBULIN UR ELPH-MCNC: 2.5 GM/DL
GLOBULIN UR ELPH-MCNC: 2.5 GM/DL
GLOBULIN UR ELPH-MCNC: 2.6 GM/DL
GLOBULIN UR ELPH-MCNC: 3.1 GM/DL
GLUCOSE BLDC GLUCOMTR-MCNC: 101 MG/DL (ref 70–130)
GLUCOSE BLDC GLUCOMTR-MCNC: 109 MG/DL (ref 70–130)
GLUCOSE BLDC GLUCOMTR-MCNC: 118 MG/DL (ref 70–130)
GLUCOSE BLDC GLUCOMTR-MCNC: 119 MG/DL (ref 70–130)
GLUCOSE BLDC GLUCOMTR-MCNC: 125 MG/DL (ref 70–130)
GLUCOSE BLDC GLUCOMTR-MCNC: 128 MG/DL (ref 70–130)
GLUCOSE BLDC GLUCOMTR-MCNC: 131 MG/DL (ref 70–130)
GLUCOSE BLDC GLUCOMTR-MCNC: 132 MG/DL (ref 70–130)
GLUCOSE BLDC GLUCOMTR-MCNC: 134 MG/DL (ref 70–130)
GLUCOSE BLDC GLUCOMTR-MCNC: 137 MG/DL (ref 70–130)
GLUCOSE BLDC GLUCOMTR-MCNC: 138 MG/DL (ref 70–130)
GLUCOSE BLDC GLUCOMTR-MCNC: 138 MG/DL (ref 70–130)
GLUCOSE BLDC GLUCOMTR-MCNC: 140 MG/DL (ref 70–130)
GLUCOSE BLDC GLUCOMTR-MCNC: 141 MG/DL (ref 70–130)
GLUCOSE BLDC GLUCOMTR-MCNC: 143 MG/DL (ref 70–130)
GLUCOSE BLDC GLUCOMTR-MCNC: 144 MG/DL (ref 70–130)
GLUCOSE BLDC GLUCOMTR-MCNC: 145 MG/DL (ref 70–130)
GLUCOSE BLDC GLUCOMTR-MCNC: 145 MG/DL (ref 70–130)
GLUCOSE BLDC GLUCOMTR-MCNC: 147 MG/DL (ref 70–130)
GLUCOSE BLDC GLUCOMTR-MCNC: 147 MG/DL (ref 70–130)
GLUCOSE BLDC GLUCOMTR-MCNC: 151 MG/DL (ref 70–130)
GLUCOSE BLDC GLUCOMTR-MCNC: 152 MG/DL (ref 70–130)
GLUCOSE BLDC GLUCOMTR-MCNC: 153 MG/DL (ref 70–130)
GLUCOSE BLDC GLUCOMTR-MCNC: 159 MG/DL (ref 70–130)
GLUCOSE BLDC GLUCOMTR-MCNC: 163 MG/DL (ref 70–130)
GLUCOSE BLDC GLUCOMTR-MCNC: 164 MG/DL (ref 70–130)
GLUCOSE BLDC GLUCOMTR-MCNC: 165 MG/DL (ref 70–130)
GLUCOSE BLDC GLUCOMTR-MCNC: 169 MG/DL (ref 70–130)
GLUCOSE BLDC GLUCOMTR-MCNC: 171 MG/DL (ref 70–130)
GLUCOSE BLDC GLUCOMTR-MCNC: 173 MG/DL (ref 70–130)
GLUCOSE BLDC GLUCOMTR-MCNC: 177 MG/DL (ref 70–130)
GLUCOSE BLDC GLUCOMTR-MCNC: 177 MG/DL (ref 70–130)
GLUCOSE SERPL-MCNC: 147 MG/DL (ref 65–99)
GLUCOSE SERPL-MCNC: 153 MG/DL (ref 65–99)
GLUCOSE SERPL-MCNC: 160 MG/DL (ref 65–99)
GLUCOSE SERPL-MCNC: 161 MG/DL (ref 65–99)
GLUCOSE SERPL-MCNC: 164 MG/DL (ref 65–99)
GLUCOSE SERPL-MCNC: 166 MG/DL (ref 65–99)
GLUCOSE SERPL-MCNC: 167 MG/DL (ref 65–99)
GLUCOSE SERPL-MCNC: 168 MG/DL (ref 65–99)
GLUCOSE SERPL-MCNC: 174 MG/DL (ref 65–99)
GLUCOSE SERPL-MCNC: 177 MG/DL (ref 65–99)
GLUCOSE SERPL-MCNC: 275 MG/DL (ref 65–99)
GLUCOSE UR STRIP-MCNC: NEGATIVE MG/DL
HCT VFR BLD AUTO: 30.1 % (ref 34–46.6)
HCT VFR BLD AUTO: 33.5 % (ref 34–46.6)
HCT VFR BLD AUTO: 35.9 % (ref 34–46.6)
HCT VFR BLD AUTO: 36.8 % (ref 34–46.6)
HCT VFR BLD AUTO: 36.9 % (ref 34–46.6)
HCT VFR BLD AUTO: 37.1 % (ref 34–46.6)
HCT VFR BLD AUTO: 38 % (ref 34–46.6)
HCT VFR BLD AUTO: 47.1 % (ref 34–46.6)
HCT VFR BLD AUTO: 47.2 % (ref 34–46.6)
HGB BLD-MCNC: 11.2 G/DL (ref 12–15.9)
HGB BLD-MCNC: 11.9 G/DL (ref 12–15.9)
HGB BLD-MCNC: 12.1 G/DL (ref 12–15.9)
HGB BLD-MCNC: 12.2 G/DL (ref 12–15.9)
HGB BLD-MCNC: 12.3 G/DL (ref 12–15.9)
HGB BLD-MCNC: 12.6 G/DL (ref 12–15.9)
HGB BLD-MCNC: 15 G/DL (ref 12–15.9)
HGB BLD-MCNC: 15.2 G/DL (ref 12–15.9)
HGB BLD-MCNC: 9.6 G/DL (ref 12–15.9)
HGB UR QL STRIP.AUTO: ABNORMAL
HOLD SPECIMEN: NORMAL
HOLD SPECIMEN: NORMAL
HYALINE CASTS UR QL AUTO: ABNORMAL /LPF
IMM GRANULOCYTES # BLD AUTO: 0.08 10*3/MM3 (ref 0–0.05)
IMM GRANULOCYTES # BLD AUTO: 0.09 10*3/MM3 (ref 0–0.05)
IMM GRANULOCYTES # BLD AUTO: 0.16 10*3/MM3 (ref 0–0.05)
IMM GRANULOCYTES # BLD AUTO: 0.18 10*3/MM3 (ref 0–0.05)
IMM GRANULOCYTES # BLD AUTO: 0.25 10*3/MM3 (ref 0–0.05)
IMM GRANULOCYTES NFR BLD AUTO: 0.6 % (ref 0–0.5)
IMM GRANULOCYTES NFR BLD AUTO: 0.7 % (ref 0–0.5)
IMM GRANULOCYTES NFR BLD AUTO: 1.1 % (ref 0–0.5)
IMM GRANULOCYTES NFR BLD AUTO: 1.3 % (ref 0–0.5)
IMM GRANULOCYTES NFR BLD AUTO: 1.6 % (ref 0–0.5)
KETONES UR QL STRIP: ABNORMAL
LAB AP CASE REPORT: NORMAL
LACTATE HOLD SPECIMEN: NORMAL
LEUKOCYTE ESTERASE UR QL STRIP.AUTO: ABNORMAL
LIPASE SERPL-CCNC: 42 U/L (ref 13–60)
LYMPHOCYTES # BLD AUTO: 0.32 10*3/MM3 (ref 0.7–3.1)
LYMPHOCYTES # BLD AUTO: 0.5 10*3/MM3 (ref 0.7–3.1)
LYMPHOCYTES # BLD AUTO: 0.53 10*3/MM3 (ref 0.7–3.1)
LYMPHOCYTES # BLD AUTO: 1.11 10*3/MM3 (ref 0.7–3.1)
LYMPHOCYTES # BLD AUTO: 1.94 10*3/MM3 (ref 0.7–3.1)
LYMPHOCYTES # BLD MANUAL: 0.18 10*3/MM3 (ref 0.7–3.1)
LYMPHOCYTES # BLD MANUAL: 0.47 10*3/MM3 (ref 0.7–3.1)
LYMPHOCYTES # BLD MANUAL: 0.62 10*3/MM3 (ref 0.7–3.1)
LYMPHOCYTES NFR BLD AUTO: 12.9 % (ref 19.6–45.3)
LYMPHOCYTES NFR BLD AUTO: 2 % (ref 19.6–45.3)
LYMPHOCYTES NFR BLD AUTO: 3.2 % (ref 19.6–45.3)
LYMPHOCYTES NFR BLD AUTO: 4 % (ref 19.6–45.3)
LYMPHOCYTES NFR BLD AUTO: 9.1 % (ref 19.6–45.3)
LYMPHOCYTES NFR BLD MANUAL: 1 % (ref 19.6–45.3)
LYMPHOCYTES NFR BLD MANUAL: 10 % (ref 5–12)
LYMPHOCYTES NFR BLD MANUAL: 3 % (ref 19.6–45.3)
LYMPHOCYTES NFR BLD MANUAL: 3 % (ref 5–12)
LYMPHOCYTES NFR BLD MANUAL: 5 % (ref 5–12)
LYMPHOCYTES NFR BLD MANUAL: 6 % (ref 19.6–45.3)
MAGNESIUM SERPL-MCNC: 1.7 MG/DL (ref 1.6–2.4)
MAGNESIUM SERPL-MCNC: 1.7 MG/DL (ref 1.6–2.4)
MAGNESIUM SERPL-MCNC: 1.9 MG/DL (ref 1.6–2.4)
MCH RBC QN AUTO: 29.9 PG (ref 26.6–33)
MCH RBC QN AUTO: 30.1 PG (ref 26.6–33)
MCH RBC QN AUTO: 30.2 PG (ref 26.6–33)
MCH RBC QN AUTO: 30.3 PG (ref 26.6–33)
MCH RBC QN AUTO: 30.4 PG (ref 26.6–33)
MCH RBC QN AUTO: 30.5 PG (ref 26.6–33)
MCH RBC QN AUTO: 30.7 PG (ref 26.6–33)
MCHC RBC AUTO-ENTMCNC: 31.8 G/DL (ref 31.5–35.7)
MCHC RBC AUTO-ENTMCNC: 31.9 G/DL (ref 31.5–35.7)
MCHC RBC AUTO-ENTMCNC: 32.3 G/DL (ref 31.5–35.7)
MCHC RBC AUTO-ENTMCNC: 32.8 G/DL (ref 31.5–35.7)
MCHC RBC AUTO-ENTMCNC: 32.9 G/DL (ref 31.5–35.7)
MCHC RBC AUTO-ENTMCNC: 33.1 G/DL (ref 31.5–35.7)
MCHC RBC AUTO-ENTMCNC: 33.2 G/DL (ref 31.5–35.7)
MCHC RBC AUTO-ENTMCNC: 33.4 G/DL (ref 31.5–35.7)
MCHC RBC AUTO-ENTMCNC: 33.4 G/DL (ref 31.5–35.7)
MCV RBC AUTO: 91.1 FL (ref 79–97)
MCV RBC AUTO: 91.3 FL (ref 79–97)
MCV RBC AUTO: 91.6 FL (ref 79–97)
MCV RBC AUTO: 92.5 FL (ref 79–97)
MCV RBC AUTO: 93.3 FL (ref 79–97)
MCV RBC AUTO: 95 FL (ref 79–97)
MCV RBC AUTO: 96.5 FL (ref 79–97)
METAMYELOCYTES NFR BLD MANUAL: 2 % (ref 0–0)
MONOCYTES # BLD AUTO: 0.47 10*3/MM3 (ref 0.1–0.9)
MONOCYTES # BLD AUTO: 0.72 10*3/MM3 (ref 0.1–0.9)
MONOCYTES # BLD AUTO: 0.76 10*3/MM3 (ref 0.1–0.9)
MONOCYTES # BLD AUTO: 0.9 10*3/MM3 (ref 0.1–0.9)
MONOCYTES # BLD AUTO: 1.03 10*3/MM3 (ref 0.1–0.9)
MONOCYTES # BLD AUTO: 1.13 10*3/MM3 (ref 0.1–0.9)
MONOCYTES # BLD AUTO: 1.13 10*3/MM3 (ref 0.1–0.9)
MONOCYTES # BLD AUTO: 1.16 10*3/MM3 (ref 0.1–0.9)
MONOCYTES NFR BLD AUTO: 4.6 % (ref 5–12)
MONOCYTES NFR BLD AUTO: 6.2 % (ref 5–12)
MONOCYTES NFR BLD AUTO: 6.7 % (ref 5–12)
MONOCYTES NFR BLD AUTO: 7.5 % (ref 5–12)
MONOCYTES NFR BLD AUTO: 9.4 % (ref 5–12)
NEUTROPHILS # BLD AUTO: 14.55 10*3/MM3 (ref 1.7–7)
NEUTROPHILS # BLD AUTO: 16.76 10*3/MM3 (ref 1.7–7)
NEUTROPHILS # BLD AUTO: 8.62 10*3/MM3 (ref 1.7–7)
NEUTROPHILS NFR BLD AUTO: 10.14 10*3/MM3 (ref 1.7–7)
NEUTROPHILS NFR BLD AUTO: 10.54 10*3/MM3 (ref 1.7–7)
NEUTROPHILS NFR BLD AUTO: 11.58 10*3/MM3 (ref 1.7–7)
NEUTROPHILS NFR BLD AUTO: 14.42 10*3/MM3 (ref 1.7–7)
NEUTROPHILS NFR BLD AUTO: 14.93 10*3/MM3 (ref 1.7–7)
NEUTROPHILS NFR BLD AUTO: 77.2 % (ref 42.7–76)
NEUTROPHILS NFR BLD AUTO: 83.2 % (ref 42.7–76)
NEUTROPHILS NFR BLD AUTO: 85 % (ref 42.7–76)
NEUTROPHILS NFR BLD AUTO: 88.8 % (ref 42.7–76)
NEUTROPHILS NFR BLD AUTO: 91.6 % (ref 42.7–76)
NEUTROPHILS NFR BLD MANUAL: 30 % (ref 42.7–76)
NEUTROPHILS NFR BLD MANUAL: 72 % (ref 42.7–76)
NEUTROPHILS NFR BLD MANUAL: 80 % (ref 42.7–76)
NEUTS BAND NFR BLD MANUAL: 13 % (ref 0–5)
NEUTS BAND NFR BLD MANUAL: 20 % (ref 0–5)
NEUTS BAND NFR BLD MANUAL: 54 % (ref 0–5)
NITRITE UR QL STRIP: NEGATIVE
NRBC BLD AUTO-RTO: 0 /100 WBC (ref 0–0.2)
PATH REPORT.FINAL DX SPEC: NORMAL
PH UR STRIP.AUTO: <=5 [PH] (ref 5–8)
PHOSPHATE SERPL-MCNC: 2.8 MG/DL (ref 2.5–4.5)
PHOSPHATE SERPL-MCNC: 3.1 MG/DL (ref 2.5–4.5)
PHOSPHATE SERPL-MCNC: 3.2 MG/DL (ref 2.5–4.5)
PLATELET # BLD AUTO: 150 10*3/MM3 (ref 140–450)
PLATELET # BLD AUTO: 177 10*3/MM3 (ref 140–450)
PLATELET # BLD AUTO: 192 10*3/MM3 (ref 140–450)
PLATELET # BLD AUTO: 208 10*3/MM3 (ref 140–450)
PLATELET # BLD AUTO: 213 10*3/MM3 (ref 140–450)
PLATELET # BLD AUTO: 214 10*3/MM3 (ref 140–450)
PLATELET # BLD AUTO: 214 10*3/MM3 (ref 140–450)
PLATELET # BLD AUTO: 234 10*3/MM3 (ref 140–450)
PLATELET # BLD AUTO: 261 10*3/MM3 (ref 140–450)
PMV BLD AUTO: 10.8 FL (ref 6–12)
PMV BLD AUTO: 10.9 FL (ref 6–12)
PMV BLD AUTO: 11 FL (ref 6–12)
PMV BLD AUTO: 11.1 FL (ref 6–12)
PMV BLD AUTO: 11.1 FL (ref 6–12)
PMV BLD AUTO: 11.5 FL (ref 6–12)
PMV BLD AUTO: 11.6 FL (ref 6–12)
PMV BLD AUTO: 11.9 FL (ref 6–12)
PMV BLD AUTO: 12.1 FL (ref 6–12)
POTASSIUM SERPL-SCNC: 3.4 MMOL/L (ref 3.5–5.2)
POTASSIUM SERPL-SCNC: 3.5 MMOL/L (ref 3.5–5.2)
POTASSIUM SERPL-SCNC: 3.5 MMOL/L (ref 3.5–5.2)
POTASSIUM SERPL-SCNC: 3.6 MMOL/L (ref 3.5–5.2)
POTASSIUM SERPL-SCNC: 3.7 MMOL/L (ref 3.5–5.2)
POTASSIUM SERPL-SCNC: 3.7 MMOL/L (ref 3.5–5.2)
POTASSIUM SERPL-SCNC: 3.8 MMOL/L (ref 3.5–5.2)
POTASSIUM SERPL-SCNC: 3.8 MMOL/L (ref 3.5–5.2)
POTASSIUM SERPL-SCNC: 3.9 MMOL/L (ref 3.5–5.2)
POTASSIUM SERPL-SCNC: 3.9 MMOL/L (ref 3.5–5.2)
POTASSIUM SERPL-SCNC: 4.1 MMOL/L (ref 3.5–5.2)
PROCALCITONIN SERPL-MCNC: 0.12 NG/ML (ref 0–0.25)
PROT SERPL-MCNC: 5.3 G/DL (ref 6–8.5)
PROT SERPL-MCNC: 5.3 G/DL (ref 6–8.5)
PROT SERPL-MCNC: 5.5 G/DL (ref 6–8.5)
PROT SERPL-MCNC: 7.5 G/DL (ref 6–8.5)
PROT UR QL STRIP: ABNORMAL
QT INTERVAL: 372 MS
QT INTERVAL: 398 MS
QTC INTERVAL: 450 MS
QTC INTERVAL: 459 MS
RBC # BLD AUTO: 3.17 10*6/MM3 (ref 3.77–5.28)
RBC # BLD AUTO: 3.67 10*6/MM3 (ref 3.77–5.28)
RBC # BLD AUTO: 3.92 10*6/MM3 (ref 3.77–5.28)
RBC # BLD AUTO: 4.01 10*6/MM3 (ref 3.77–5.28)
RBC # BLD AUTO: 4.04 10*6/MM3 (ref 3.77–5.28)
RBC # BLD AUTO: 4.05 10*6/MM3 (ref 3.77–5.28)
RBC # BLD AUTO: 4.17 10*6/MM3 (ref 3.77–5.28)
RBC # BLD AUTO: 4.89 10*6/MM3 (ref 3.77–5.28)
RBC # BLD AUTO: 5.05 10*6/MM3 (ref 3.77–5.28)
RBC # UR: ABNORMAL /HPF
RBC MORPH BLD: NORMAL
REF LAB TEST METHOD: ABNORMAL
RH BLD: POSITIVE
SARS-COV-2 RNA PNL SPEC NAA+PROBE: NOT DETECTED
SCAN SLIDE: NORMAL
SMALL PLATELETS BLD QL SMEAR: ADEQUATE
SODIUM SERPL-SCNC: 134 MMOL/L (ref 136–145)
SODIUM SERPL-SCNC: 135 MMOL/L (ref 136–145)
SODIUM SERPL-SCNC: 136 MMOL/L (ref 136–145)
SODIUM SERPL-SCNC: 137 MMOL/L (ref 136–145)
SODIUM SERPL-SCNC: 138 MMOL/L (ref 136–145)
SODIUM SERPL-SCNC: 138 MMOL/L (ref 136–145)
SODIUM SERPL-SCNC: 139 MMOL/L (ref 136–145)
SP GR UR STRIP: 1.02 (ref 1–1.03)
SQUAMOUS #/AREA URNS HPF: ABNORMAL /HPF
T&S EXPIRATION DATE: NORMAL
TRIGL SERPL-MCNC: 84 MG/DL (ref 0–150)
TROPONIN T SERPL-MCNC: <0.01 NG/ML (ref 0–0.03)
TROPONIN T SERPL-MCNC: <0.01 NG/ML (ref 0–0.03)
UNIT  ABO: NORMAL
UNIT  ABO: NORMAL
UNIT  RH: NORMAL
UNIT  RH: NORMAL
UROBILINOGEN UR QL STRIP: ABNORMAL
WBC # BLD AUTO: 10.26 10*3/MM3 (ref 3.4–10.8)
WBC # BLD AUTO: 12.19 10*3/MM3 (ref 3.4–10.8)
WBC # BLD AUTO: 12.39 10*3/MM3 (ref 3.4–10.8)
WBC # BLD AUTO: 14.03 10*3/MM3 (ref 3.4–10.8)
WBC # BLD AUTO: 15.02 10*3/MM3 (ref 3.4–10.8)
WBC # BLD AUTO: 15.74 10*3/MM3 (ref 3.4–10.8)
WBC # BLD AUTO: 15.82 10*3/MM3 (ref 3.4–10.8)
WBC # BLD AUTO: 16.81 10*3/MM3 (ref 3.4–10.8)
WBC # BLD AUTO: 18.02 10*3/MM3 (ref 3.4–10.8)
WBC MORPH BLD: NORMAL
WBC UR QL AUTO: ABNORMAL /HPF
WHOLE BLOOD HOLD SPECIMEN: NORMAL
WHOLE BLOOD HOLD SPECIMEN: NORMAL

## 2020-01-01 PROCEDURE — 80048 BASIC METABOLIC PNL TOTAL CA: CPT | Performed by: INTERNAL MEDICINE

## 2020-01-01 PROCEDURE — 25010000002 PIPERACILLIN SOD-TAZOBACTAM PER 1 G: Performed by: SURGERY

## 2020-01-01 PROCEDURE — 86900 BLOOD TYPING SEROLOGIC ABO: CPT

## 2020-01-01 PROCEDURE — 99285 EMERGENCY DEPT VISIT HI MDM: CPT

## 2020-01-01 PROCEDURE — 74177 CT ABD & PELVIS W/CONTRAST: CPT

## 2020-01-01 PROCEDURE — 25010000002 ONDANSETRON PER 1 MG: Performed by: SURGERY

## 2020-01-01 PROCEDURE — 25010000002 ONDANSETRON PER 1 MG: Performed by: EMERGENCY MEDICINE

## 2020-01-01 PROCEDURE — 82962 GLUCOSE BLOOD TEST: CPT

## 2020-01-01 PROCEDURE — 0 DIATRIZOATE MEGLUMINE & SODIUM PER 1 ML: Performed by: INTERNAL MEDICINE

## 2020-01-01 PROCEDURE — 0DN80ZZ RELEASE SMALL INTESTINE, OPEN APPROACH: ICD-10-PCS | Performed by: SURGERY

## 2020-01-01 PROCEDURE — 85025 COMPLETE CBC W/AUTO DIFF WBC: CPT | Performed by: SURGERY

## 2020-01-01 PROCEDURE — 25010000002 POTASSIUM CHLORIDE PER 2 MEQ OF POTASSIUM: Performed by: INTERNAL MEDICINE

## 2020-01-01 PROCEDURE — 99232 SBSQ HOSP IP/OBS MODERATE 35: CPT | Performed by: INTERNAL MEDICINE

## 2020-01-01 PROCEDURE — 25010000002 ONDANSETRON PER 1 MG: Performed by: NURSE ANESTHETIST, CERTIFIED REGISTERED

## 2020-01-01 PROCEDURE — 81001 URINALYSIS AUTO W/SCOPE: CPT | Performed by: EMERGENCY MEDICINE

## 2020-01-01 PROCEDURE — 25010000002 IOPAMIDOL 61 % SOLUTION: Performed by: INTERNAL MEDICINE

## 2020-01-01 PROCEDURE — 87635 SARS-COV-2 COVID-19 AMP PRB: CPT | Performed by: INTERNAL MEDICINE

## 2020-01-01 PROCEDURE — 85007 BL SMEAR W/DIFF WBC COUNT: CPT | Performed by: INTERNAL MEDICINE

## 2020-01-01 PROCEDURE — 84100 ASSAY OF PHOSPHORUS: CPT | Performed by: INTERNAL MEDICINE

## 2020-01-01 PROCEDURE — 85025 COMPLETE CBC W/AUTO DIFF WBC: CPT | Performed by: EMERGENCY MEDICINE

## 2020-01-01 PROCEDURE — 85025 COMPLETE CBC W/AUTO DIFF WBC: CPT | Performed by: INTERNAL MEDICINE

## 2020-01-01 PROCEDURE — 84145 PROCALCITONIN (PCT): CPT | Performed by: EMERGENCY MEDICINE

## 2020-01-01 PROCEDURE — 0DS80ZZ REPOSITION SMALL INTESTINE, OPEN APPROACH: ICD-10-PCS | Performed by: SURGERY

## 2020-01-01 PROCEDURE — 80053 COMPREHEN METABOLIC PANEL: CPT | Performed by: INTERNAL MEDICINE

## 2020-01-01 PROCEDURE — 25010000002 FENTANYL CITRATE (PF) 100 MCG/2ML SOLUTION: Performed by: EMERGENCY MEDICINE

## 2020-01-01 PROCEDURE — 72170 X-RAY EXAM OF PELVIS: CPT

## 2020-01-01 PROCEDURE — 97530 THERAPEUTIC ACTIVITIES: CPT

## 2020-01-01 PROCEDURE — 99024 POSTOP FOLLOW-UP VISIT: CPT | Performed by: SURGERY

## 2020-01-01 PROCEDURE — 25010000002 HYDROMORPHONE 1 MG/ML SOLUTION: Performed by: SURGERY

## 2020-01-01 PROCEDURE — 63710000001 INSULIN ASPART PER 5 UNITS: Performed by: INTERNAL MEDICINE

## 2020-01-01 PROCEDURE — 84484 ASSAY OF TROPONIN QUANT: CPT | Performed by: INTERNAL MEDICINE

## 2020-01-01 PROCEDURE — 93010 ELECTROCARDIOGRAM REPORT: CPT | Performed by: INTERNAL MEDICINE

## 2020-01-01 PROCEDURE — 71045 X-RAY EXAM CHEST 1 VIEW: CPT

## 2020-01-01 PROCEDURE — 83735 ASSAY OF MAGNESIUM: CPT | Performed by: INTERNAL MEDICINE

## 2020-01-01 PROCEDURE — 99238 HOSP IP/OBS DSCHRG MGMT 30/<: CPT | Performed by: FAMILY MEDICINE

## 2020-01-01 PROCEDURE — 25010000002 HYDRALAZINE PER 20 MG: Performed by: INTERNAL MEDICINE

## 2020-01-01 PROCEDURE — 97110 THERAPEUTIC EXERCISES: CPT

## 2020-01-01 PROCEDURE — 02HV33Z INSERTION OF INFUSION DEVICE INTO SUPERIOR VENA CAVA, PERCUTANEOUS APPROACH: ICD-10-PCS | Performed by: SURGERY

## 2020-01-01 PROCEDURE — 84484 ASSAY OF TROPONIN QUANT: CPT | Performed by: EMERGENCY MEDICINE

## 2020-01-01 PROCEDURE — 25010000002 PROPOFOL 200 MG/20ML EMULSION: Performed by: NURSE ANESTHETIST, CERTIFIED REGISTERED

## 2020-01-01 PROCEDURE — 85007 BL SMEAR W/DIFF WBC COUNT: CPT | Performed by: SURGERY

## 2020-01-01 PROCEDURE — 74018 RADEX ABDOMEN 1 VIEW: CPT

## 2020-01-01 PROCEDURE — 83605 ASSAY OF LACTIC ACID: CPT | Performed by: EMERGENCY MEDICINE

## 2020-01-01 PROCEDURE — 74022 RADEX COMPL AQT ABD SERIES: CPT

## 2020-01-01 PROCEDURE — 44120 REMOVAL OF SMALL INTESTINE: CPT | Performed by: SURGERY

## 2020-01-01 PROCEDURE — 99223 1ST HOSP IP/OBS HIGH 75: CPT | Performed by: EMERGENCY MEDICINE

## 2020-01-01 PROCEDURE — C1751 CATH, INF, PER/CENT/MIDLINE: HCPCS

## 2020-01-01 PROCEDURE — 93005 ELECTROCARDIOGRAM TRACING: CPT | Performed by: EMERGENCY MEDICINE

## 2020-01-01 PROCEDURE — 0DBA0ZZ EXCISION OF JEJUNUM, OPEN APPROACH: ICD-10-PCS | Performed by: SURGERY

## 2020-01-01 PROCEDURE — 86920 COMPATIBILITY TEST SPIN: CPT

## 2020-01-01 PROCEDURE — 25010000003 AMPICILLIN-SULBACTAM PER 1.5 G: Performed by: SURGERY

## 2020-01-01 PROCEDURE — C1894 INTRO/SHEATH, NON-LASER: HCPCS

## 2020-01-01 PROCEDURE — 80048 BASIC METABOLIC PNL TOTAL CA: CPT | Performed by: SURGERY

## 2020-01-01 PROCEDURE — P9016 RBC LEUKOCYTES REDUCED: HCPCS

## 2020-01-01 PROCEDURE — 97116 GAIT TRAINING THERAPY: CPT

## 2020-01-01 PROCEDURE — 25010000002 MORPHINE SULFATE (PF) 2 MG/ML SOLUTION: Performed by: INTERNAL MEDICINE

## 2020-01-01 PROCEDURE — 94660 CPAP INITIATION&MGMT: CPT

## 2020-01-01 PROCEDURE — 25010000002 PROCHLORPERAZINE 10 MG/2ML SOLUTION: Performed by: INTERNAL MEDICINE

## 2020-01-01 PROCEDURE — 94799 UNLISTED PULMONARY SVC/PX: CPT

## 2020-01-01 PROCEDURE — 93005 ELECTROCARDIOGRAM TRACING: CPT | Performed by: INTERNAL MEDICINE

## 2020-01-01 PROCEDURE — 86850 RBC ANTIBODY SCREEN: CPT | Performed by: SURGERY

## 2020-01-01 PROCEDURE — 25010000002 FENTANYL CITRATE (PF) 250 MCG/5ML SOLUTION: Performed by: NURSE ANESTHETIST, CERTIFIED REGISTERED

## 2020-01-01 PROCEDURE — 25010000002 DEXAMETHASONE PER 1 MG: Performed by: NURSE ANESTHETIST, CERTIFIED REGISTERED

## 2020-01-01 PROCEDURE — 97166 OT EVAL MOD COMPLEX 45 MIN: CPT

## 2020-01-01 PROCEDURE — 99222 1ST HOSP IP/OBS MODERATE 55: CPT | Performed by: SURGERY

## 2020-01-01 PROCEDURE — 86900 BLOOD TYPING SEROLOGIC ABO: CPT | Performed by: SURGERY

## 2020-01-01 PROCEDURE — 84478 ASSAY OF TRIGLYCERIDES: CPT | Performed by: INTERNAL MEDICINE

## 2020-01-01 PROCEDURE — 36430 TRANSFUSION BLD/BLD COMPNT: CPT

## 2020-01-01 PROCEDURE — 80048 BASIC METABOLIC PNL TOTAL CA: CPT | Performed by: EMERGENCY MEDICINE

## 2020-01-01 PROCEDURE — 97161 PT EVAL LOW COMPLEX 20 MIN: CPT

## 2020-01-01 PROCEDURE — 86901 BLOOD TYPING SEROLOGIC RH(D): CPT | Performed by: SURGERY

## 2020-01-01 PROCEDURE — 25010000002 MAGNESIUM SULFATE PER 500 MG OF MAGNESIUM: Performed by: INTERNAL MEDICINE

## 2020-01-01 PROCEDURE — 25010000002 ONDANSETRON PER 1 MG

## 2020-01-01 PROCEDURE — 88307 TISSUE EXAM BY PATHOLOGIST: CPT | Performed by: SURGERY

## 2020-01-01 PROCEDURE — 44050 REDUCE BOWEL OBSTRUCTION: CPT | Performed by: SURGERY

## 2020-01-01 PROCEDURE — 83690 ASSAY OF LIPASE: CPT | Performed by: EMERGENCY MEDICINE

## 2020-01-01 PROCEDURE — 74176 CT ABD & PELVIS W/O CONTRAST: CPT

## 2020-01-01 PROCEDURE — 80053 COMPREHEN METABOLIC PANEL: CPT | Performed by: EMERGENCY MEDICINE

## 2020-01-01 PROCEDURE — 85027 COMPLETE CBC AUTOMATED: CPT | Performed by: INTERNAL MEDICINE

## 2020-01-01 DEVICE — PROXIMATE LINEAR CUTTER RELOAD, BLUE, 75MM
Type: IMPLANTABLE DEVICE | Site: ABDOMEN | Status: FUNCTIONAL
Brand: PROXIMATE

## 2020-01-01 DEVICE — PROXIMATE RELOADABLE LINEAR CUTTER WITH SAFETY LOCK-OUT, 75MM
Type: IMPLANTABLE DEVICE | Site: ABDOMEN | Status: FUNCTIONAL
Brand: PROXIMATE

## 2020-01-01 RX ORDER — NALOXONE HCL 0.4 MG/ML
0.4 VIAL (ML) INJECTION
Status: DISCONTINUED | OUTPATIENT
Start: 2020-01-01 | End: 2020-01-01

## 2020-01-01 RX ORDER — SODIUM CHLORIDE, SODIUM LACTATE, POTASSIUM CHLORIDE, CALCIUM CHLORIDE 600; 310; 30; 20 MG/100ML; MG/100ML; MG/100ML; MG/100ML
1000 INJECTION, SOLUTION INTRAVENOUS CONTINUOUS
Status: DISCONTINUED | OUTPATIENT
Start: 2020-01-01 | End: 2020-01-01

## 2020-01-01 RX ORDER — FENTANYL CITRATE 50 UG/ML
INJECTION, SOLUTION INTRAMUSCULAR; INTRAVENOUS AS NEEDED
Status: DISCONTINUED | OUTPATIENT
Start: 2020-01-01 | End: 2020-01-01 | Stop reason: SURG

## 2020-01-01 RX ORDER — SODIUM CHLORIDE 0.9 % (FLUSH) 0.9 %
3 SYRINGE (ML) INJECTION EVERY 12 HOURS SCHEDULED
Status: DISCONTINUED | OUTPATIENT
Start: 2020-01-01 | End: 2020-01-01 | Stop reason: HOSPADM

## 2020-01-01 RX ORDER — HYDRALAZINE HYDROCHLORIDE 20 MG/ML
10 INJECTION INTRAMUSCULAR; INTRAVENOUS EVERY 6 HOURS PRN
Status: DISCONTINUED | OUTPATIENT
Start: 2020-01-01 | End: 2020-01-01 | Stop reason: HOSPADM

## 2020-01-01 RX ORDER — SODIUM CHLORIDE 0.9 % (FLUSH) 0.9 %
10 SYRINGE (ML) INJECTION AS NEEDED
Status: DISCONTINUED | OUTPATIENT
Start: 2020-01-01 | End: 2020-01-01 | Stop reason: HOSPADM

## 2020-01-01 RX ORDER — DEXAMETHASONE SODIUM PHOSPHATE 4 MG/ML
INJECTION, SOLUTION INTRA-ARTICULAR; INTRALESIONAL; INTRAMUSCULAR; INTRAVENOUS; SOFT TISSUE AS NEEDED
Status: DISCONTINUED | OUTPATIENT
Start: 2020-01-01 | End: 2020-01-01 | Stop reason: SURG

## 2020-01-01 RX ORDER — ALUMINA, MAGNESIA, AND SIMETHICONE 2400; 2400; 240 MG/30ML; MG/30ML; MG/30ML
15 SUSPENSION ORAL ONCE
Status: COMPLETED | OUTPATIENT
Start: 2020-01-01 | End: 2020-01-01

## 2020-01-01 RX ORDER — ACETAMINOPHEN 160 MG/5ML
650 SOLUTION ORAL EVERY 4 HOURS PRN
Status: DISCONTINUED | OUTPATIENT
Start: 2020-01-01 | End: 2020-01-01

## 2020-01-01 RX ORDER — ALPRAZOLAM 0.25 MG/1
0.25 TABLET ORAL 2 TIMES DAILY PRN
Status: DISCONTINUED | OUTPATIENT
Start: 2020-01-01 | End: 2020-01-01

## 2020-01-01 RX ORDER — NITROGLYCERIN 0.4 MG/1
0.4 TABLET SUBLINGUAL
Status: DISCONTINUED | OUTPATIENT
Start: 2020-01-01 | End: 2020-01-01 | Stop reason: HOSPADM

## 2020-01-01 RX ORDER — ONDANSETRON 2 MG/ML
INJECTION INTRAMUSCULAR; INTRAVENOUS AS NEEDED
Status: DISCONTINUED | OUTPATIENT
Start: 2020-01-01 | End: 2020-01-01 | Stop reason: SURG

## 2020-01-01 RX ORDER — SODIUM CHLORIDE 0.9 % (FLUSH) 0.9 %
10 SYRINGE (ML) INJECTION EVERY 12 HOURS SCHEDULED
Status: DISCONTINUED | OUTPATIENT
Start: 2020-01-01 | End: 2020-01-01

## 2020-01-01 RX ORDER — NITROGLYCERIN 0.4 MG/1
TABLET SUBLINGUAL
Status: COMPLETED
Start: 2020-01-01 | End: 2020-01-01

## 2020-01-01 RX ORDER — SODIUM CHLORIDE 0.9 % (FLUSH) 0.9 %
10 SYRINGE (ML) INJECTION EVERY 12 HOURS SCHEDULED
Status: DISCONTINUED | OUTPATIENT
Start: 2020-01-01 | End: 2020-01-01 | Stop reason: HOSPADM

## 2020-01-01 RX ORDER — SODIUM CHLORIDE 0.9 % (FLUSH) 0.9 %
20 SYRINGE (ML) INJECTION AS NEEDED
Status: DISCONTINUED | OUTPATIENT
Start: 2020-01-01 | End: 2020-01-01

## 2020-01-01 RX ORDER — PROCHLORPERAZINE EDISYLATE 5 MG/ML
2.5 INJECTION INTRAMUSCULAR; INTRAVENOUS EVERY 6 HOURS PRN
Status: DISCONTINUED | OUTPATIENT
Start: 2020-01-01 | End: 2020-01-01 | Stop reason: HOSPADM

## 2020-01-01 RX ORDER — ROCURONIUM BROMIDE 10 MG/ML
INJECTION, SOLUTION INTRAVENOUS AS NEEDED
Status: DISCONTINUED | OUTPATIENT
Start: 2020-01-01 | End: 2020-01-01 | Stop reason: SURG

## 2020-01-01 RX ORDER — FENTANYL CITRATE 50 UG/ML
50 INJECTION, SOLUTION INTRAMUSCULAR; INTRAVENOUS ONCE
Status: COMPLETED | OUTPATIENT
Start: 2020-01-01 | End: 2020-01-01

## 2020-01-01 RX ORDER — DEXTROSE MONOHYDRATE 25 G/50ML
25 INJECTION, SOLUTION INTRAVENOUS
Status: DISCONTINUED | OUTPATIENT
Start: 2020-01-01 | End: 2020-01-01 | Stop reason: HOSPADM

## 2020-01-01 RX ORDER — ONDANSETRON 2 MG/ML
4 INJECTION INTRAMUSCULAR; INTRAVENOUS ONCE
Status: COMPLETED | OUTPATIENT
Start: 2020-01-01 | End: 2020-01-01

## 2020-01-01 RX ORDER — DEXTROSE AND SODIUM CHLORIDE 5; .45 G/100ML; G/100ML
75 INJECTION, SOLUTION INTRAVENOUS CONTINUOUS
Status: DISCONTINUED | OUTPATIENT
Start: 2020-01-01 | End: 2020-01-01 | Stop reason: HOSPADM

## 2020-01-01 RX ORDER — MULTIVIT WITH IRON,MINERALS
15 LIQUID (ML) ORAL DAILY
Status: DISCONTINUED | OUTPATIENT
Start: 2020-01-01 | End: 2020-01-01 | Stop reason: ALTCHOICE

## 2020-01-01 RX ORDER — SODIUM CHLORIDE 0.9 % (FLUSH) 0.9 %
3-10 SYRINGE (ML) INJECTION AS NEEDED
Status: DISCONTINUED | OUTPATIENT
Start: 2020-01-01 | End: 2020-01-01 | Stop reason: HOSPADM

## 2020-01-01 RX ORDER — NICOTINE POLACRILEX 4 MG
1 LOZENGE BUCCAL
Status: DISCONTINUED | OUTPATIENT
Start: 2020-01-01 | End: 2020-01-01 | Stop reason: HOSPADM

## 2020-01-01 RX ORDER — SODIUM CHLORIDE 0.9 % (FLUSH) 0.9 %
20 SYRINGE (ML) INJECTION AS NEEDED
Status: DISCONTINUED | OUTPATIENT
Start: 2020-01-01 | End: 2020-01-01 | Stop reason: HOSPADM

## 2020-01-01 RX ORDER — LABETALOL HYDROCHLORIDE 5 MG/ML
10 INJECTION, SOLUTION INTRAVENOUS EVERY 4 HOURS PRN
Status: DISCONTINUED | OUTPATIENT
Start: 2020-01-01 | End: 2020-01-01 | Stop reason: HOSPADM

## 2020-01-01 RX ORDER — NALOXONE HCL 0.4 MG/ML
0.1 VIAL (ML) INJECTION
Status: DISCONTINUED | OUTPATIENT
Start: 2020-01-01 | End: 2020-01-01 | Stop reason: SDUPTHER

## 2020-01-01 RX ORDER — ACETAMINOPHEN 325 MG/1
650 TABLET ORAL EVERY 4 HOURS PRN
Status: DISCONTINUED | OUTPATIENT
Start: 2020-01-01 | End: 2020-01-01

## 2020-01-01 RX ORDER — ONDANSETRON 2 MG/ML
4 INJECTION INTRAMUSCULAR; INTRAVENOUS EVERY 6 HOURS PRN
Status: DISCONTINUED | OUTPATIENT
Start: 2020-01-01 | End: 2020-01-01 | Stop reason: HOSPADM

## 2020-01-01 RX ORDER — BUPIVACAINE HYDROCHLORIDE 2.5 MG/ML
INJECTION, SOLUTION EPIDURAL; INFILTRATION; INTRACAUDAL
Status: DISPENSED
Start: 2020-01-01 | End: 2020-01-01

## 2020-01-01 RX ORDER — MAGNESIUM HYDROXIDE 1200 MG/15ML
LIQUID ORAL AS NEEDED
Status: DISCONTINUED | OUTPATIENT
Start: 2020-01-01 | End: 2020-01-01 | Stop reason: HOSPADM

## 2020-01-01 RX ORDER — ACETAMINOPHEN 650 MG/1
650 SUPPOSITORY RECTAL EVERY 4 HOURS PRN
Status: DISCONTINUED | OUTPATIENT
Start: 2020-01-01 | End: 2020-01-01 | Stop reason: HOSPADM

## 2020-01-01 RX ORDER — PROPOFOL 10 MG/ML
INJECTION, EMULSION INTRAVENOUS AS NEEDED
Status: DISCONTINUED | OUTPATIENT
Start: 2020-01-01 | End: 2020-01-01 | Stop reason: SURG

## 2020-01-01 RX ORDER — SODIUM CHLORIDE 0.9 % (FLUSH) 0.9 %
10 SYRINGE (ML) INJECTION AS NEEDED
Status: DISCONTINUED | OUTPATIENT
Start: 2020-01-01 | End: 2020-01-01

## 2020-01-01 RX ORDER — LIDOCAINE HYDROCHLORIDE 20 MG/ML
INJECTION, SOLUTION INTRAVENOUS AS NEEDED
Status: DISCONTINUED | OUTPATIENT
Start: 2020-01-01 | End: 2020-01-01 | Stop reason: SURG

## 2020-01-01 RX ORDER — METOPROLOL TARTRATE 50 MG/1
100 TABLET, FILM COATED ORAL EVERY 12 HOURS SCHEDULED
Status: DISCONTINUED | OUTPATIENT
Start: 2020-01-01 | End: 2020-01-01

## 2020-01-01 RX ORDER — HYDROCODONE BITARTRATE AND ACETAMINOPHEN 5; 325 MG/1; MG/1
1 TABLET ORAL 2 TIMES DAILY
Status: DISCONTINUED | OUTPATIENT
Start: 2020-01-01 | End: 2020-01-01

## 2020-01-01 RX ORDER — SODIUM CHLORIDE 9 MG/ML
INJECTION, SOLUTION INTRAVENOUS CONTINUOUS PRN
Status: DISCONTINUED | OUTPATIENT
Start: 2020-01-01 | End: 2020-01-01 | Stop reason: SURG

## 2020-01-01 RX ORDER — LABETALOL HYDROCHLORIDE 5 MG/ML
INJECTION, SOLUTION INTRAVENOUS
Status: DISPENSED
Start: 2020-01-01 | End: 2020-01-01

## 2020-01-01 RX ORDER — ONDANSETRON 2 MG/ML
INJECTION INTRAMUSCULAR; INTRAVENOUS
Status: COMPLETED
Start: 2020-01-01 | End: 2020-01-01

## 2020-01-01 RX ORDER — SODIUM CHLORIDE, SODIUM LACTATE, POTASSIUM CHLORIDE, CALCIUM CHLORIDE 600; 310; 30; 20 MG/100ML; MG/100ML; MG/100ML; MG/100ML
100 INJECTION, SOLUTION INTRAVENOUS CONTINUOUS
Status: DISCONTINUED | OUTPATIENT
Start: 2020-01-01 | End: 2020-01-01

## 2020-01-01 RX ORDER — DEXAMETHASONE SODIUM PHOSPHATE 10 MG/ML
INJECTION, SOLUTION INTRAMUSCULAR; INTRAVENOUS
Status: DISPENSED
Start: 2020-01-01 | End: 2020-01-01

## 2020-01-01 RX ORDER — LIDOCAINE HYDROCHLORIDE 20 MG/ML
15 SOLUTION OROPHARYNGEAL ONCE
Status: COMPLETED | OUTPATIENT
Start: 2020-01-01 | End: 2020-01-01

## 2020-01-01 RX ORDER — MORPHINE SULFATE 2 MG/ML
2 INJECTION, SOLUTION INTRAMUSCULAR; INTRAVENOUS
Status: DISCONTINUED | OUTPATIENT
Start: 2020-01-01 | End: 2020-01-01 | Stop reason: HOSPADM

## 2020-01-01 RX ORDER — MORPHINE SULFATE 2 MG/ML
2 INJECTION, SOLUTION INTRAMUSCULAR; INTRAVENOUS EVERY 4 HOURS PRN
Status: DISCONTINUED | OUTPATIENT
Start: 2020-01-01 | End: 2020-01-01

## 2020-01-01 RX ADMIN — INSULIN ASPART 2 UNITS: 100 INJECTION, SOLUTION INTRAVENOUS; SUBCUTANEOUS at 12:00

## 2020-01-01 RX ADMIN — SODIUM CHLORIDE, PRESERVATIVE FREE 3 ML: 5 INJECTION INTRAVENOUS at 21:32

## 2020-01-01 RX ADMIN — ONDANSETRON 4 MG: 2 INJECTION INTRAMUSCULAR; INTRAVENOUS at 15:12

## 2020-01-01 RX ADMIN — HYDROCODONE BITARTRATE AND ACETAMINOPHEN 1 TABLET: 5; 325 TABLET ORAL at 09:00

## 2020-01-01 RX ADMIN — SUGAMMADEX 300 MG: 100 INJECTION, SOLUTION INTRAVENOUS at 16:31

## 2020-01-01 RX ADMIN — SODIUM CHLORIDE, PRESERVATIVE FREE 3 ML: 5 INJECTION INTRAVENOUS at 21:54

## 2020-01-01 RX ADMIN — HYDROCODONE BITARTRATE AND ACETAMINOPHEN 1 TABLET: 5; 325 TABLET ORAL at 10:22

## 2020-01-01 RX ADMIN — SODIUM CHLORIDE, PRESERVATIVE FREE 3 ML: 5 INJECTION INTRAVENOUS at 20:09

## 2020-01-01 RX ADMIN — SODIUM CHLORIDE: 9 INJECTION, SOLUTION INTRAVENOUS at 16:00

## 2020-01-01 RX ADMIN — INSULIN ASPART 2 UNITS: 100 INJECTION, SOLUTION INTRAVENOUS; SUBCUTANEOUS at 11:18

## 2020-01-01 RX ADMIN — INSULIN ASPART 2 UNITS: 100 INJECTION, SOLUTION INTRAVENOUS; SUBCUTANEOUS at 18:00

## 2020-01-01 RX ADMIN — POTASSIUM CHLORIDE: 2 INJECTION, SOLUTION, CONCENTRATE INTRAVENOUS at 16:37

## 2020-01-01 RX ADMIN — PROPOFOL 80 MG: 10 INJECTION, EMULSION INTRAVENOUS at 15:27

## 2020-01-01 RX ADMIN — TAZOBACTAM SODIUM AND PIPERACILLIN SODIUM 3.38 G: 375; 3 INJECTION, SOLUTION INTRAVENOUS at 06:39

## 2020-01-01 RX ADMIN — TOPICAL ANESTHETIC 1 SPRAY: 200 SPRAY DENTAL; PERIODONTAL at 15:12

## 2020-01-01 RX ADMIN — LIDOCAINE HYDROCHLORIDE 15 ML: 20 SOLUTION ORAL; TOPICAL at 20:31

## 2020-01-01 RX ADMIN — ALUMINUM HYDROXIDE, MAGNESIUM HYDROXIDE, AND DIMETHICONE 15 ML: 400; 400; 40 SUSPENSION ORAL at 20:31

## 2020-01-01 RX ADMIN — INSULIN ASPART 2 UNITS: 100 INJECTION, SOLUTION INTRAVENOUS; SUBCUTANEOUS at 18:16

## 2020-01-01 RX ADMIN — METOPROLOL TARTRATE 100 MG: 50 TABLET, FILM COATED ORAL at 20:00

## 2020-01-01 RX ADMIN — HYDROCODONE BITARTRATE AND ACETAMINOPHEN 1 TABLET: 5; 325 TABLET ORAL at 21:28

## 2020-01-01 RX ADMIN — SERTRALINE HYDROCHLORIDE 50 MG: 50 TABLET ORAL at 09:00

## 2020-01-01 RX ADMIN — INSULIN ASPART 2 UNITS: 100 INJECTION, SOLUTION INTRAVENOUS; SUBCUTANEOUS at 06:59

## 2020-01-01 RX ADMIN — SODIUM CHLORIDE, PRESERVATIVE FREE 10 ML: 5 INJECTION INTRAVENOUS at 21:54

## 2020-01-01 RX ADMIN — SODIUM CHLORIDE 3 G: 900 INJECTION INTRAVENOUS at 15:16

## 2020-01-01 RX ADMIN — MORPHINE SULFATE 2 MG: 2 INJECTION, SOLUTION INTRAMUSCULAR; INTRAVENOUS at 05:45

## 2020-01-01 RX ADMIN — SODIUM CHLORIDE, POTASSIUM CHLORIDE, SODIUM LACTATE AND CALCIUM CHLORIDE 1000 ML: 600; 310; 30; 20 INJECTION, SOLUTION INTRAVENOUS at 14:59

## 2020-01-01 RX ADMIN — INSULIN ASPART 2 UNITS: 100 INJECTION, SOLUTION INTRAVENOUS; SUBCUTANEOUS at 12:30

## 2020-01-01 RX ADMIN — SODIUM CHLORIDE, PRESERVATIVE FREE 10 ML: 5 INJECTION INTRAVENOUS at 02:20

## 2020-01-01 RX ADMIN — SODIUM CHLORIDE, PRESERVATIVE FREE 10 ML: 5 INJECTION INTRAVENOUS at 21:29

## 2020-01-01 RX ADMIN — DICLOFENAC 2 G: 10 GEL TOPICAL at 18:46

## 2020-01-01 RX ADMIN — METOPROLOL TARTRATE 100 MG: 50 TABLET, FILM COATED ORAL at 09:00

## 2020-01-01 RX ADMIN — PROCHLORPERAZINE EDISYLATE 2.5 MG: 5 INJECTION INTRAMUSCULAR; INTRAVENOUS at 10:44

## 2020-01-01 RX ADMIN — MORPHINE SULFATE 2 MG: 2 INJECTION, SOLUTION INTRAMUSCULAR; INTRAVENOUS at 01:18

## 2020-01-01 RX ADMIN — FENTANYL CITRATE 50 MCG: 50 INJECTION INTRAMUSCULAR; INTRAVENOUS at 00:05

## 2020-01-01 RX ADMIN — SODIUM CHLORIDE, PRESERVATIVE FREE 10 ML: 5 INJECTION INTRAVENOUS at 11:00

## 2020-01-01 RX ADMIN — DICLOFENAC 2 G: 10 GEL TOPICAL at 10:37

## 2020-01-01 RX ADMIN — I.V. FAT EMULSION 50 G: 20 EMULSION INTRAVENOUS at 15:38

## 2020-01-01 RX ADMIN — SERTRALINE HYDROCHLORIDE 50 MG: 50 TABLET ORAL at 08:29

## 2020-01-01 RX ADMIN — SODIUM CHLORIDE, PRESERVATIVE FREE 3 ML: 5 INJECTION INTRAVENOUS at 08:39

## 2020-01-01 RX ADMIN — SODIUM CHLORIDE, PRESERVATIVE FREE 3 ML: 5 INJECTION INTRAVENOUS at 09:58

## 2020-01-01 RX ADMIN — SODIUM CHLORIDE, PRESERVATIVE FREE 10 ML: 5 INJECTION INTRAVENOUS at 08:56

## 2020-01-01 RX ADMIN — SERTRALINE HYDROCHLORIDE 50 MG: 50 TABLET ORAL at 10:22

## 2020-01-01 RX ADMIN — LIDOCAINE HYDROCHLORIDE 60 MG: 20 INJECTION, SOLUTION INTRAVENOUS at 15:25

## 2020-01-01 RX ADMIN — SODIUM CHLORIDE, POTASSIUM CHLORIDE, SODIUM LACTATE AND CALCIUM CHLORIDE 500 ML: 600; 310; 30; 20 INJECTION, SOLUTION INTRAVENOUS at 18:40

## 2020-01-01 RX ADMIN — HYDRALAZINE HYDROCHLORIDE 10 MG: 20 INJECTION INTRAMUSCULAR; INTRAVENOUS at 11:17

## 2020-01-01 RX ADMIN — HYDROCODONE BITARTRATE AND ACETAMINOPHEN 1 TABLET: 5; 325 TABLET ORAL at 08:29

## 2020-01-01 RX ADMIN — ONDANSETRON 4 MG: 2 INJECTION INTRAMUSCULAR; INTRAVENOUS at 17:47

## 2020-01-01 RX ADMIN — TAZOBACTAM SODIUM AND PIPERACILLIN SODIUM 3.38 G: 375; 3 INJECTION, SOLUTION INTRAVENOUS at 23:02

## 2020-01-01 RX ADMIN — SODIUM CHLORIDE, PRESERVATIVE FREE 3 ML: 5 INJECTION INTRAVENOUS at 20:45

## 2020-01-01 RX ADMIN — DICLOFENAC 2 G: 10 GEL TOPICAL at 13:22

## 2020-01-01 RX ADMIN — DEXTROSE AND SODIUM CHLORIDE 75 ML/HR: 5; 450 INJECTION, SOLUTION INTRAVENOUS at 21:52

## 2020-01-01 RX ADMIN — DEXTROSE AND SODIUM CHLORIDE 75 ML/HR: 5; 450 INJECTION, SOLUTION INTRAVENOUS at 17:39

## 2020-01-01 RX ADMIN — SODIUM CHLORIDE, POTASSIUM CHLORIDE, SODIUM LACTATE AND CALCIUM CHLORIDE 100 ML/HR: 600; 310; 30; 20 INJECTION, SOLUTION INTRAVENOUS at 02:20

## 2020-01-01 RX ADMIN — SODIUM CHLORIDE, PRESERVATIVE FREE 10 ML: 5 INJECTION INTRAVENOUS at 20:10

## 2020-01-01 RX ADMIN — POTASSIUM CHLORIDE: 2 INJECTION, SOLUTION, CONCENTRATE INTRAVENOUS at 15:00

## 2020-01-01 RX ADMIN — NITROGLYCERIN 0.4 MG: 0.4 TABLET SUBLINGUAL at 10:33

## 2020-01-01 RX ADMIN — SERTRALINE HYDROCHLORIDE 50 MG: 50 TABLET ORAL at 10:35

## 2020-01-01 RX ADMIN — NITROGLYCERIN 0.4 MG: 0.4 TABLET SUBLINGUAL at 10:30

## 2020-01-01 RX ADMIN — ONDANSETRON 4 MG: 2 INJECTION INTRAMUSCULAR; INTRAVENOUS at 02:25

## 2020-01-01 RX ADMIN — DICLOFENAC 2 G: 10 GEL TOPICAL at 21:42

## 2020-01-01 RX ADMIN — DEXTROSE AND SODIUM CHLORIDE 100 ML/HR: 5; 450 INJECTION, SOLUTION INTRAVENOUS at 18:34

## 2020-01-01 RX ADMIN — ALPRAZOLAM 0.25 MG: 0.25 TABLET ORAL at 20:07

## 2020-01-01 RX ADMIN — SODIUM CHLORIDE, PRESERVATIVE FREE 3 ML: 5 INJECTION INTRAVENOUS at 08:57

## 2020-01-01 RX ADMIN — PROCHLORPERAZINE EDISYLATE 2.5 MG: 5 INJECTION INTRAMUSCULAR; INTRAVENOUS at 17:22

## 2020-01-01 RX ADMIN — DEXAMETHASONE SODIUM PHOSPHATE 4 MG: 4 INJECTION, SOLUTION INTRAMUSCULAR; INTRAVENOUS at 15:56

## 2020-01-01 RX ADMIN — HYDROMORPHONE HYDROCHLORIDE 0.5 MG: 1 INJECTION, SOLUTION INTRAMUSCULAR; INTRAVENOUS; SUBCUTANEOUS at 13:11

## 2020-01-01 RX ADMIN — HYDROCODONE BITARTRATE AND ACETAMINOPHEN 1 TABLET: 5; 325 TABLET ORAL at 09:12

## 2020-01-01 RX ADMIN — SODIUM CHLORIDE, PRESERVATIVE FREE 10 ML: 5 INJECTION INTRAVENOUS at 08:50

## 2020-01-01 RX ADMIN — HYDROCODONE BITARTRATE AND ACETAMINOPHEN 1 TABLET: 5; 325 TABLET ORAL at 20:45

## 2020-01-01 RX ADMIN — I.V. FAT EMULSION 50 G: 20 EMULSION INTRAVENOUS at 14:18

## 2020-01-01 RX ADMIN — SODIUM CHLORIDE, PRESERVATIVE FREE 10 ML: 5 INJECTION INTRAVENOUS at 21:43

## 2020-01-01 RX ADMIN — ONDANSETRON 4 MG: 2 INJECTION INTRAMUSCULAR; INTRAVENOUS at 06:39

## 2020-01-01 RX ADMIN — INSULIN ASPART 2 UNITS: 100 INJECTION, SOLUTION INTRAVENOUS; SUBCUTANEOUS at 06:34

## 2020-01-01 RX ADMIN — POTASSIUM CHLORIDE: 2 INJECTION, SOLUTION, CONCENTRATE INTRAVENOUS at 15:39

## 2020-01-01 RX ADMIN — HYDROCODONE BITARTRATE AND ACETAMINOPHEN 1 TABLET: 5; 325 TABLET ORAL at 08:55

## 2020-01-01 RX ADMIN — DEXTROSE AND SODIUM CHLORIDE 75 ML/HR: 5; 450 INJECTION, SOLUTION INTRAVENOUS at 05:46

## 2020-01-01 RX ADMIN — DEXTROSE AND SODIUM CHLORIDE 75 ML/HR: 5; 450 INJECTION, SOLUTION INTRAVENOUS at 04:06

## 2020-01-01 RX ADMIN — DEXTROSE AND SODIUM CHLORIDE 75 ML/HR: 5; 450 INJECTION, SOLUTION INTRAVENOUS at 06:51

## 2020-01-01 RX ADMIN — ONDANSETRON 4 MG: 2 INJECTION INTRAMUSCULAR; INTRAVENOUS at 23:35

## 2020-01-01 RX ADMIN — SODIUM CHLORIDE, PRESERVATIVE FREE 3 ML: 5 INJECTION INTRAVENOUS at 08:50

## 2020-01-01 RX ADMIN — ONDANSETRON 4 MG: 2 INJECTION INTRAMUSCULAR; INTRAVENOUS at 20:31

## 2020-01-01 RX ADMIN — SERTRALINE HYDROCHLORIDE 50 MG: 50 TABLET ORAL at 08:38

## 2020-01-01 RX ADMIN — ONDANSETRON 4 MG: 2 INJECTION INTRAMUSCULAR; INTRAVENOUS at 00:04

## 2020-01-01 RX ADMIN — SERTRALINE HYDROCHLORIDE 50 MG: 50 TABLET ORAL at 09:12

## 2020-01-01 RX ADMIN — SODIUM CHLORIDE, PRESERVATIVE FREE 3 ML: 5 INJECTION INTRAVENOUS at 21:43

## 2020-01-01 RX ADMIN — METOPROLOL TARTRATE 100 MG: 50 TABLET, FILM COATED ORAL at 09:12

## 2020-01-01 RX ADMIN — SODIUM CHLORIDE, PRESERVATIVE FREE 10 ML: 5 INJECTION INTRAVENOUS at 09:12

## 2020-01-01 RX ADMIN — IOPAMIDOL 100 ML: 612 INJECTION, SOLUTION INTRAVENOUS at 13:30

## 2020-01-01 RX ADMIN — METOPROLOL TARTRATE 100 MG: 50 TABLET, FILM COATED ORAL at 20:07

## 2020-01-01 RX ADMIN — HYDROCODONE BITARTRATE AND ACETAMINOPHEN 1 TABLET: 5; 325 TABLET ORAL at 20:00

## 2020-01-01 RX ADMIN — HYDRALAZINE HYDROCHLORIDE 10 MG: 20 INJECTION INTRAMUSCULAR; INTRAVENOUS at 03:44

## 2020-01-01 RX ADMIN — DICLOFENAC 2 G: 10 GEL TOPICAL at 12:42

## 2020-01-01 RX ADMIN — DICLOFENAC 2 G: 10 GEL TOPICAL at 18:00

## 2020-01-01 RX ADMIN — HYDROCODONE BITARTRATE AND ACETAMINOPHEN 1 TABLET: 5; 325 TABLET ORAL at 21:42

## 2020-01-01 RX ADMIN — MORPHINE SULFATE 2 MG: 2 INJECTION, SOLUTION INTRAMUSCULAR; INTRAVENOUS at 00:39

## 2020-01-01 RX ADMIN — METOPROLOL TARTRATE 100 MG: 50 TABLET, FILM COATED ORAL at 20:45

## 2020-01-01 RX ADMIN — HYDROCODONE BITARTRATE AND ACETAMINOPHEN 1 TABLET: 5; 325 TABLET ORAL at 08:38

## 2020-01-01 RX ADMIN — HYDROMORPHONE HYDROCHLORIDE 0.5 MG: 1 INJECTION, SOLUTION INTRAMUSCULAR; INTRAVENOUS; SUBCUTANEOUS at 17:47

## 2020-01-01 RX ADMIN — DIATRIZOATE MEGLUMINE AND DIATRIZOATE SODIUM 30 ML: 660; 100 LIQUID ORAL; RECTAL at 13:45

## 2020-01-01 RX ADMIN — DEXTROSE AND SODIUM CHLORIDE 75 ML/HR: 5; 450 INJECTION, SOLUTION INTRAVENOUS at 18:19

## 2020-01-01 RX ADMIN — HYDROCODONE BITARTRATE AND ACETAMINOPHEN 1 TABLET: 5; 325 TABLET ORAL at 23:01

## 2020-01-01 RX ADMIN — SODIUM CHLORIDE, PRESERVATIVE FREE 3 ML: 5 INJECTION INTRAVENOUS at 09:00

## 2020-01-01 RX ADMIN — SODIUM CHLORIDE, PRESERVATIVE FREE 3 ML: 5 INJECTION INTRAVENOUS at 20:00

## 2020-01-01 RX ADMIN — ONDANSETRON 4 MG: 2 INJECTION INTRAMUSCULAR; INTRAVENOUS at 09:42

## 2020-01-01 RX ADMIN — SODIUM CHLORIDE, PRESERVATIVE FREE 10 ML: 5 INJECTION INTRAVENOUS at 08:29

## 2020-01-01 RX ADMIN — SODIUM CHLORIDE, PRESERVATIVE FREE 10 ML: 5 INJECTION INTRAVENOUS at 16:46

## 2020-01-01 RX ADMIN — SODIUM CHLORIDE, PRESERVATIVE FREE 3 ML: 5 INJECTION INTRAVENOUS at 09:33

## 2020-01-01 RX ADMIN — DEXTROSE AND SODIUM CHLORIDE 75 ML/HR: 5; 450 INJECTION, SOLUTION INTRAVENOUS at 16:37

## 2020-01-01 RX ADMIN — METOPROLOL TARTRATE 100 MG: 50 TABLET, FILM COATED ORAL at 21:28

## 2020-01-01 RX ADMIN — SODIUM CHLORIDE 1000 ML: 9 INJECTION, SOLUTION INTRAVENOUS at 20:30

## 2020-01-01 RX ADMIN — ALPRAZOLAM 0.25 MG: 0.25 TABLET ORAL at 10:49

## 2020-01-01 RX ADMIN — DEXTROSE AND SODIUM CHLORIDE 75 ML/HR: 5; 450 INJECTION, SOLUTION INTRAVENOUS at 03:37

## 2020-01-01 RX ADMIN — HYDROCODONE BITARTRATE AND ACETAMINOPHEN 1 TABLET: 5; 325 TABLET ORAL at 10:35

## 2020-01-01 RX ADMIN — I.V. FAT EMULSION 50 G: 20 EMULSION INTRAVENOUS at 15:20

## 2020-01-01 RX ADMIN — SODIUM CHLORIDE, PRESERVATIVE FREE 10 ML: 5 INJECTION INTRAVENOUS at 21:32

## 2020-01-01 RX ADMIN — HYDROMORPHONE HYDROCHLORIDE 0.5 MG: 1 INJECTION, SOLUTION INTRAMUSCULAR; INTRAVENOUS; SUBCUTANEOUS at 04:02

## 2020-01-01 RX ADMIN — METOPROLOL TARTRATE 100 MG: 50 TABLET, FILM COATED ORAL at 08:37

## 2020-01-01 RX ADMIN — FENTANYL CITRATE 50 MCG: 50 INJECTION INTRAMUSCULAR; INTRAVENOUS at 20:31

## 2020-01-01 RX ADMIN — METOPROLOL TARTRATE 100 MG: 50 TABLET, FILM COATED ORAL at 10:22

## 2020-01-01 RX ADMIN — ALPRAZOLAM 0.25 MG: 0.25 TABLET ORAL at 05:13

## 2020-01-01 RX ADMIN — SODIUM CHLORIDE, PRESERVATIVE FREE 10 ML: 5 INJECTION INTRAVENOUS at 12:30

## 2020-01-01 RX ADMIN — FENTANYL CITRATE 100 MCG: 50 INJECTION, SOLUTION INTRAMUSCULAR; INTRAVENOUS at 15:26

## 2020-01-01 RX ADMIN — SODIUM CHLORIDE, POTASSIUM CHLORIDE, SODIUM LACTATE AND CALCIUM CHLORIDE: 600; 310; 30; 20 INJECTION, SOLUTION INTRAVENOUS at 15:14

## 2020-01-01 RX ADMIN — HYDROCODONE BITARTRATE AND ACETAMINOPHEN 1 TABLET: 5; 325 TABLET ORAL at 20:07

## 2020-01-01 RX ADMIN — METOPROLOL TARTRATE 100 MG: 50 TABLET, FILM COATED ORAL at 21:42

## 2020-01-01 RX ADMIN — METOPROLOL TARTRATE 100 MG: 50 TABLET, FILM COATED ORAL at 23:32

## 2020-01-01 RX ADMIN — ROCURONIUM BROMIDE 120 MG: 10 INJECTION INTRAVENOUS at 15:29

## 2020-01-01 RX ADMIN — NITROGLYCERIN 0.4 MG: 0.4 TABLET, ORALLY DISINTEGRATING SUBLINGUAL at 10:30

## 2020-01-01 RX ADMIN — METOPROLOL TARTRATE 100 MG: 50 TABLET, FILM COATED ORAL at 08:55

## 2020-01-01 RX ADMIN — SODIUM CHLORIDE, PRESERVATIVE FREE 10 ML: 5 INJECTION INTRAVENOUS at 10:00

## 2020-01-01 RX ADMIN — DEXTROSE AND SODIUM CHLORIDE 75 ML/HR: 5; 450 INJECTION, SOLUTION INTRAVENOUS at 05:28

## 2020-01-01 RX ADMIN — ONDANSETRON 4 MG: 2 INJECTION INTRAMUSCULAR; INTRAVENOUS at 08:56

## 2020-01-01 RX ADMIN — MORPHINE SULFATE 2 MG: 2 INJECTION, SOLUTION INTRAMUSCULAR; INTRAVENOUS at 20:58

## 2020-01-01 RX ADMIN — POTASSIUM CHLORIDE: 2 INJECTION, SOLUTION, CONCENTRATE INTRAVENOUS at 15:21

## 2020-01-01 RX ADMIN — HYDRALAZINE HYDROCHLORIDE 10 MG: 20 INJECTION INTRAMUSCULAR; INTRAVENOUS at 06:32

## 2020-01-01 RX ADMIN — METOPROLOL TARTRATE 100 MG: 50 TABLET, FILM COATED ORAL at 10:35

## 2020-01-01 RX ADMIN — SODIUM CHLORIDE, PRESERVATIVE FREE 10 ML: 5 INJECTION INTRAVENOUS at 09:00

## 2020-01-01 RX ADMIN — ONDANSETRON 4 MG: 2 INJECTION INTRAMUSCULAR; INTRAVENOUS at 21:55

## 2020-01-01 RX ADMIN — METOPROLOL TARTRATE 100 MG: 50 TABLET, FILM COATED ORAL at 08:29

## 2020-01-01 RX ADMIN — ONDANSETRON 4 MG: 2 INJECTION INTRAMUSCULAR; INTRAVENOUS at 20:58

## 2020-01-01 RX ADMIN — SERTRALINE HYDROCHLORIDE 50 MG: 50 TABLET ORAL at 08:55

## 2020-01-01 RX ADMIN — SODIUM CHLORIDE, PRESERVATIVE FREE 10 ML: 5 INJECTION INTRAVENOUS at 08:39

## 2020-01-01 RX ADMIN — MORPHINE SULFATE 2 MG: 2 INJECTION, SOLUTION INTRAMUSCULAR; INTRAVENOUS at 14:17

## 2020-01-01 RX ADMIN — ONDANSETRON 4 MG: 2 INJECTION INTRAMUSCULAR; INTRAVENOUS at 15:56

## 2020-01-01 RX ADMIN — SODIUM CHLORIDE, PRESERVATIVE FREE 3 ML: 5 INJECTION INTRAVENOUS at 21:29

## 2020-01-01 RX ADMIN — SODIUM CHLORIDE, PRESERVATIVE FREE 3 ML: 5 INJECTION INTRAVENOUS at 09:13

## 2020-01-01 RX ADMIN — ONDANSETRON 4 MG: 2 INJECTION INTRAMUSCULAR; INTRAVENOUS at 10:35

## 2020-11-04 PROBLEM — K56.609 SMALL BOWEL OBSTRUCTION (HCC): Status: ACTIVE | Noted: 2020-01-01

## 2020-11-05 PROBLEM — K21.9 GERD WITHOUT ESOPHAGITIS: Status: ACTIVE | Noted: 2020-01-01

## 2020-11-05 PROBLEM — I48.91 ATRIAL FIBRILLATION (HCC): Status: ACTIVE | Noted: 2020-01-01

## 2020-11-05 PROBLEM — F41.1 GENERALIZED ANXIETY DISORDER: Status: ACTIVE | Noted: 2020-01-01

## 2020-11-05 NOTE — CONSULTS
Tunde Barrow    8/6/1931    Primary Care Provider: Lorna Hagen MD    Chief Complaint   Patient presents with   • Abdominal Pain   • Vomiting   • Nausea          SUBJECTIVE:    History of present illness: I was asked to see the patient in consultation today for evaluation and treatment of a history significant for 1-2 days worth of abdominal distention associated with nausea and some vomiting.  The patient states she is never felt like this in the past, she does give a history significant for passing stool and flatus 24 hours ago.    She is an elderly white female, does have the usual medical problems, but lives by herself and has fairly good family support here locally.  She has no further complaints at this time, the discomfort is in the lower quadrants, crampy in nature, associated with abdominal distention, associated with nausea, worse with movement, not relieved, present over the past 48 hours.    She has had a previous appendectomy, cholecystectomy, and hysterectomy.    Review of Systems:  Constitutional:  Negative for chills, fever, and unexpected weight change.  HENT: Negative for trouble swallowing and voice change.  Eyes:  Negative for visual disturbance.  Respiratory:  Negative for apnea, cough, chest tightness, shortness of breath, and wheezing.  Cardiovascular:  Negative for chest pain, palpitations, and leg swelling.  Gastrointestinal: Nausea and  there is a history significant for m some vomiting Musculoskeletal:  Negative for back pain, gait problem, and joint swelling.  Skin:  Negative for color change, rash, and wound  Neurological:  Negative for dizziness, syncope, speech difficulty, weakness, numbness, and headaches.  Hematological:  Negative for adenopathy.  Does not bruise/bleed easily.  Psychiatric/Behavioral:  Negative for confusion.  The patient is not nervous/anxious.        History:    Past Medical History:   Diagnosis Date   • A-fib (CMS/HCC)    • Anxiety and depression   "  • Arthritis    • Body piercing     BOTH EARS   • Cataract, bilateral    • Chronic back pain    • Diverticulitis    • GERD (gastroesophageal reflux disease)    • History of nuclear stress test 2017   • [place] (hard of hearing)     SLIGHTLY-NO HEARING AIDS   • Oxygen dependent     AT NIGHT.  UNSURE HOW MUCH SHE IS ON.    • Wears dentures     UPPER PLATE ONLY       Past Surgical History:   Procedure Laterality Date   • APPENDECTOMY     • BREAST SURGERY Right     FIBROID TUMORS X 3    • CARDIAC CATHETERIZATION      STATED \"IT WAS QUITE A WHILE AGO.  I DIDN'T NEED STENTS\".     • CATARACT EXTRACTION W/ INTRAOCULAR LENS IMPLANT Right 4/8/2019    Procedure: CATARACT PHACO EXTRACTION WITH INTRAOCULAR LENS IMPLANT RIGHT;  Surgeon: Cindy Olsen MD;  Location: [facility] OR;  Service: Ophthalmology   • CATARACT EXTRACTION W/ INTRAOCULAR LENS IMPLANT Left 6/17/2019    Procedure: CATARACT PHACO EXTRACTION WITH INTRAOCULAR LENS IMPLANT LEFT EYE;  Surgeon: Cindy Olsen MD;  Location: [facility] OR;  Service: Ophthalmology   • CHOLECYSTECTOMY     • COLONOSCOPY     • ENDOSCOPY     • HYSTERECTOMY     • LAPAROSCOPIC TUBAL LIGATION     • TONSILLECTOMY         History reviewed. No pertinent family history.    Social History     Socioeconomic History   • Marital status:      Spouse name: Not on file   • Number of children: Not on file   • Years of education: Not on file   • Highest education level: Not on file   Tobacco Use   • Smoking status: Never Smoker   • Smokeless tobacco: Never Used   Substance and Sexual Activity   • Alcohol use: No     Frequency: Never   • Drug use: No   • Sexual activity: Defer       Allergies:  No Known Allergies    Medications:    Current Facility-Administered Medications:   •  acetaminophen (TYLENOL) tablet 650 mg, 650 mg, Oral, Q4H PRN **OR** acetaminophen (TYLENOL) 160 MG/5ML solution 650 mg, 650 mg, Oral, Q4H PRN **OR** acetaminophen (TYLENOL) suppository 650 mg, 650 mg, Rectal, Q4H PRN, Ailin Karimi, " "DO  •  ALPRAZolam (XANAX) tablet 0.25 mg, 0.25 mg, Oral, BID PRN, Ailin Karimi, DO  •  HYDROcodone-acetaminophen (NORCO) 5-325 MG per tablet 1 tablet, 1 tablet, Oral, BID, Ailin Karimi, DO  •  HYDROmorphone (DILAUDID) injection 0.5 mg, 0.5 mg, Intravenous, Q2H PRN **AND** naloxone (NARCAN) injection 0.4 mg, 0.4 mg, Intravenous, Q5 Min PRN, Ailin Karimi,   •  lactated ringers infusion, 100 mL/hr, Intravenous, Continuous, Ailin Karimi, DO, Last Rate: 100 mL/hr at 11/05/20 0220, 100 mL/hr at 11/05/20 0220  •  [START ON 11/6/2020] metoprolol tartrate (LOPRESSOR) tablet 100 mg, 100 mg, Oral, Q12H, Ailin Karimi, DO  •  ondansetron (ZOFRAN) injection 4 mg, 4 mg, Intravenous, Q6H PRN, Ailin Karimi, , 4 mg at 11/05/20 0225  •  sertraline (ZOLOFT) tablet 50 mg, 50 mg, Oral, Daily, Ailin Karimi, DO  •  sodium chloride 0.9 % flush 10 mL, 10 mL, Intravenous, PRN, Ailin Karimi, DO  •  sodium chloride 0.9 % flush 10 mL, 10 mL, Intravenous, Q12H, Ailin Karimi, DO, 10 mL at 11/05/20 0220  •  sodium chloride 0.9 % flush 10 mL, 10 mL, Intravenous, PRN, Ailin Karimi, DO    OBJECTIVE:    Vital Signs:   Vitals:    11/04/20 2340 11/05/20 0008 11/05/20 0055 11/05/20 0412   BP: (!) 174/132 (!) 149/103 (!) 142/102 138/92   BP Location:       Patient Position:  Sitting Lying Lying   Pulse: 93 81 83 90   Resp:  18 18 18   Temp:   98.4 °F (36.9 °C) 98.2 °F (36.8 °C)   TempSrc:   Oral Oral   SpO2: 95% 94% 95% 97%   Weight:   59.6 kg (131 lb 6.3 oz)    Height:   165.1 cm (65\")        Physical Exam:   General Appearance:    Alert, cooperative, in no acute distress   Head:    Normocephalic, without obvious abnormality, atraumatic   Eyes:            Lids and lashes normal, conjunctivae and sclerae normal, no   icterus, no pallor, corneas clear, PERRLA   Throat:   No oral lesions, no thrush, oral mucosa moist   Neck:   No adenopathy, supple, trachea midline, no thyromegaly, no   carotid bruit, no JVD   Lungs:     Clear to " auscultation,respirations regular, even and                  unlabored    Heart:    Regular rhythm and normal rate, normal S1 and S2, no            murmur, no gallop, no rub, no click   Chest Wall:    No abnormalities observed   Abdomen:    She is tender in the lower quadrants but this is mild, there is distention, no peritoneal signs    Extremities:   Moves all extremities well, no edema, no cyanosis, no             redness   Pulses:   Pulses palpable and equal bilaterally   Skin:   No bleeding, bruising or rash   Lymph nodes:   No palpable adenopathy   Neurologic:   Cranial nerves 2 - 12 grossly intact, sensation intact, DTR       present and equal bilaterally   Results Review:    Lab Results (last 24 hours)     Procedure Component Value Units Date/Time    Troponin [[id number]] Collected: 11/05/20 0034    Specimen: Blood Updated: 11/05/20 0646    Basic Metabolic Panel [[id number]]  (Abnormal) Collected: 11/05/20 0034    Specimen: Blood Updated: 11/05/20 0559     Glucose 164 mg/dL      BUN 39 mg/dL      Creatinine 1.33 mg/dL      Sodium 137 mmol/L      Potassium 4.1 mmol/L      Chloride 99 mmol/L      CO2 26.3 mmol/L      Calcium 9.9 mg/dL      eGFR Non African Amer 38 mL/min/1.73      BUN/Creatinine Ratio 29.3     Anion Gap 11.7 mmol/L     Narrative:      GFR Normal >60  Chronic Kidney Disease <60  Kidney Failure <15      CBC Auto Differential [[id number]]  (Abnormal) Collected: 11/05/20 0034    Specimen: Blood Updated: 11/05/20 0549     WBC 12.19 10*3/mm3      RBC 4.89 10*6/mm3      Hemoglobin 15.0 g/dL      Hematocrit 47.2 %      MCV 96.5 fL      MCH 30.7 pg      MCHC 31.8 g/dL      RDW 13.1 %      RDW-SD 46.8 fl      MPV 12.1 fL      Platelets 234 10*3/mm3      Neutrophil % 83.2 %      Lymphocyte % 9.1 %      Monocyte % 6.2 %      Eosinophil % 0.3 %      Basophil % 0.5 %      Immature Grans % 0.7 %      Neutrophils, Absolute 10.14 10*3/mm3      Lymphocytes, Absolute 1.11 10*3/mm3      Monocytes, Absolute 0.76  "10*3/mm3      Eosinophils, Absolute 0.04 10*3/mm3      Basophils, Absolute 0.06 10*3/mm3      Immature Grans, Absolute 0.08 10*3/mm3      nRBC 0.0 /100 WBC     Procalcitonin [[id number]]  (Normal) Collected: 11/04/20 2020    Specimen: Blood Updated: 11/05/20 0135     Procalcitonin 0.12 ng/mL     Narrative:      As a Marker for Sepsis (Non-Neonates):   1. <0.5 ng/mL represents a low risk of severe sepsis and/or septic shock.  1. >2 ng/mL represents a high risk of severe sepsis and/or septic shock.    As a Marker for Lower Respiratory Tract Infections that require antibiotic therapy:  PCT on Admission     Antibiotic Therapy             6-12 Hrs later  > 0.5                Strongly Recommended            >0.25 - <0.5         Recommended  0.1 - 0.25           Discouraged                   Remeasure/reassess PCT  <0.1                 Strongly Discouraged          Remeasure/reassess PCT      As 28 day mortality risk marker: \"Change in Procalcitonin Result\" (> 80 % or <=80 %) if Day 0 (or Day 1) and Day 4 values are available. Refer to http://www.[employer]-pct-calculator.com/   Change in PCT <=80 %   A decrease of PCT levels below or equal to 80 % defines a positive change in PCT test result representing a higher risk for 28-day all-cause mortality of patients diagnosed with severe sepsis or septic shock.  Change in PCT > 80 %   A decrease of PCT levels of more than 80 % defines a negative change in PCT result representing a lower risk for 28-day all-cause mortality of patients diagnosed with severe sepsis or septic shock.                Results may be falsely decreased if patient taking Biotin.     Lactic Acid, Reflex [[id number]]  (Abnormal) Collected: 11/05/20 0034    Specimen: Blood Updated: 11/05/20 0124     Lactate 2.3 mmol/L     Urinalysis, Microscopic Only - Urine, Clean Catch [[id number]]  (Abnormal) Collected: 11/05/20 0004    Specimen: Urine, Clean Catch Updated: 11/05/20 0040     RBC, UA 6-12 /HPF      WBC, UA 6-12 " /HPF      Bacteria, UA 2+ /HPF      Squamous Epithelial Cells, UA 3-6 /HPF      Hyaline Casts, UA 0-2 /LPF      Methodology Manual Light Microscopy    Urinalysis With Microscopic If Indicated (No Culture) - Urine, Clean Catch [[id number]]  (Abnormal) Collected: 11/05/20 0004    Specimen: Urine, Clean Catch Updated: 11/05/20 0019     Color, UA Yellow     Appearance, UA Turbid     pH, UA <=5.0     Specific Gravity, UA 1.024     Glucose, UA Negative     Ketones, UA Trace     Bilirubin, UA Negative     Blood, UA Moderate (2+)     Protein,  mg/dL (2+)     Leuk Esterase, UA Large (3+)     Nitrite, UA Negative     Urobilinogen, UA 1.0 E.U./dL    Lactic Acid, Reflex Timer (This will reflex a repeat order 3-3:15 hours after ordered.) [[id number]] Collected: 11/04/20 2026    Specimen: Blood Updated: 11/05/20 0000     Hold Tube Hold for add-ons.     Comment: Auto resulted.       [facility] [[id number]] Collected: 11/04/20 2020    Specimen: Blood Updated: 11/04/20 2132    Narrative:      The following orders were created for panel order [facility].  Procedure                               Abnormality         Status                     ---------                               -----------         ------                     Light Blue Top[[id number]]                                   Final result               Green Top (Gel)[[id number]]                                  Final result               Lavender Top[[id number]]                                     Final result               Gold Top - SST[[id number]]                                   Final result               Green Top (No Gel)[[id number]]                               In process                   Please view results for these tests on the individual orders.    Green Top (Gel) [[id number]] Collected: 11/04/20 2020    Specimen: Blood Updated: 11/04/20 2132     Extra Tube Hold for add-ons.     Comment: Auto resulted.       Gold Top - SST [[id number]] Collected: 11/04/20  2020    Specimen: Blood Updated: 11/04/20 2132     Extra Tube Hold for add-ons.     Comment: Auto resulted.       Light Blue Top [[id number]] Collected: 11/04/20 2020    Specimen: Blood Updated: 11/04/20 2132     Extra Tube hold for add-on     Comment: Auto resulted       Lavender Top [[id number]] Collected: 11/04/20 2020    Specimen: Blood Updated: 11/04/20 2132     Extra Tube hold for add-on     Comment: Auto resulted       Lactic Acid, Plasma [[id number]]  (Abnormal) Collected: 11/04/20 2026    Specimen: Blood Updated: 11/04/20 2100     Lactate 2.5 mmol/L     Comprehensive Metabolic Panel [[id number]]  (Abnormal) Collected: 11/04/20 2020    Specimen: Blood Updated: 11/04/20 2048     Glucose 166 mg/dL      BUN 37 mg/dL      Creatinine 1.34 mg/dL      Sodium 137 mmol/L      Potassium 3.9 mmol/L      Comment: Slight hemolysis detected by analyzer. Results may be affected.        Chloride 96 mmol/L      CO2 26.3 mmol/L      Calcium 10.5 mg/dL      Total Protein 7.5 g/dL      Albumin 4.40 g/dL      ALT (SGPT) 13 U/L      AST (SGOT) 23 U/L      Comment: Slight hemolysis detected by analyzer. Results may be affected.        Alkaline Phosphatase 138 U/L      Total Bilirubin 0.6 mg/dL      eGFR Non African Amer 37 mL/min/1.73      Globulin 3.1 gm/dL      A/G Ratio 1.4 g/dL      BUN/Creatinine Ratio 27.6     Anion Gap 14.7 mmol/L     Narrative:      GFR Normal >60  Chronic Kidney Disease <60  Kidney Failure <15      Lipase [[id number]]  (Normal) Collected: 11/04/20 2020    Specimen: Blood Updated: 11/04/20 2048     Lipase 42 U/L     CBC & Differential [[id number]]  (Abnormal) Collected: 11/04/20 2020    Specimen: Blood Updated: 11/04/20 2034    Narrative:      The following orders were created for panel order CBC & Differential.  Procedure                               Abnormality         Status                     ---------                               -----------         ------                     CBC Auto  Differential[[id number]]        Abnormal            Final result                 Please view results for these tests on the individual orders.    CBC Auto Differential [[id number]]  (Abnormal) Collected: 11/04/20 2020    Specimen: Blood Updated: 11/04/20 2034     WBC 15.02 10*3/mm3      RBC 5.05 10*6/mm3      Hemoglobin 15.2 g/dL      Hematocrit 47.1 %      MCV 93.3 fL      MCH 30.1 pg      MCHC 32.3 g/dL      RDW 13.0 %      RDW-SD 44.4 fl      MPV 11.0 fL      Platelets 261 10*3/mm3      Neutrophil % 77.2 %      Lymphocyte % 12.9 %      Monocyte % 7.5 %      Eosinophil % 1.3 %      Basophil % 0.5 %      Immature Grans % 0.6 %      Neutrophils, Absolute 11.58 10*3/mm3      Lymphocytes, Absolute 1.94 10*3/mm3      Monocytes, Absolute 1.13 10*3/mm3      Eosinophils, Absolute 0.20 10*3/mm3      Basophils, Absolute 0.08 10*3/mm3      Immature Grans, Absolute 0.09 10*3/mm3      nRBC 0.0 /100 WBC     Green Top (No Gel) [[id number]] Collected: 11/04/20 2020    Specimen: Blood Updated: 11/04/20 2031            I reviewed the patient's new clinical results.  I reviewed the patient's new imaging results and agree with the interpretation.  I reviewed the patient's other test results and agree with the interpretation    ASSESSMENT PLAN:    Patient Active Problem List   Diagnosis   • Spinal stenosis, lumbar region, with neurogenic claudication   • Small bowel obstruction (CMS/HCC)   • Atrial fibrillation (CMS/HCC)   • Generalized anxiety disorder   • GERD without esophagitis       In short, I did have a detailed and extensive discussion with the patient at the bedside this morning along with the nursing staff.  I have discussed the situation with the emergency room physician last evening.    I think the patient needs to get up to a chair, work on instill some oral contrast via the NG tube, and repeat the CT scan at approximately 11:00 this morning.  I did review the CT scan myself this morning it does show evidence of dilated  loops of small bowel but there is evidence of air in the colon.    Hopefully we will be able to get a better idea about the possibility of bowel obstruction and then try to plan on the possibility of future exploratory laparotomy.    I discussed the patients findings and my recommendations with patient    Umberto Martin MD  11/05/20  06:55 EST

## 2020-11-05 NOTE — PLAN OF CARE
Goal Outcome Evaluation:  Plan of Care Reviewed With: patient  Progress: no change  Outcome Summary: New admit.  VSS.  Pt in Afib with controlled rate.  Pt has NG tube placed that was placed in ED.  Will continue to monitor.

## 2020-11-05 NOTE — ANESTHESIA PROCEDURE NOTES
Airway  Urgency: elective    Date/Time: 11/5/2020 3:28 PM  Airway not difficult    General Information and Staff    Patient location during procedure: OR  CRNA: Edward Smith CRNA    Indications and Patient Condition  Indications for airway management: airway protection    Preoxygenated: yes      Final Airway Details  Final airway type: endotracheal airway      Successful airway: ETT  Cuffed: yes   Successful intubation technique: direct laryngoscopy  Endotracheal tube insertion site: oral  Blade: Kline  Blade size: 3  ETT size (mm): 7.5  Cormack-Lehane Classification: grade I - full view of glottis  Placement verified by: chest auscultation and capnometry   Number of attempts at approach: 1

## 2020-11-05 NOTE — ED PROVIDER NOTES
"Subjective   History of Present Illness    Review of Systems    Past Medical History:   Diagnosis Date   • A-fib (CMS/HCC)    • Anxiety and depression    • Arthritis    • Body piercing     BOTH EARS   • Cataract, bilateral    • Chronic back pain    • Diverticulitis    • GERD (gastroesophageal reflux disease)    • History of nuclear stress test 2017   • [place] (hard of hearing)     SLIGHTLY-NO HEARING AIDS   • Oxygen dependent     AT NIGHT.  UNSURE HOW MUCH SHE IS ON.    • Wears dentures     UPPER PLATE ONLY       No Known Allergies    Past Surgical History:   Procedure Laterality Date   • APPENDECTOMY     • BREAST SURGERY Right     FIBROID TUMORS X 3    • CARDIAC CATHETERIZATION      STATED \"IT WAS QUITE A WHILE AGO.  I DIDN'T NEED STENTS\".     • CATARACT EXTRACTION W/ INTRAOCULAR LENS IMPLANT Right 4/8/2019    Procedure: CATARACT PHACO EXTRACTION WITH INTRAOCULAR LENS IMPLANT RIGHT;  Surgeon: Cindy Olsen MD;  Location: [facility];  Service: Ophthalmology   • CATARACT EXTRACTION W/ INTRAOCULAR LENS IMPLANT Left 6/17/2019    Procedure: CATARACT PHACO EXTRACTION WITH INTRAOCULAR LENS IMPLANT LEFT EYE;  Surgeon: Cindy Olsen MD;  Location: [facility];  Service: Ophthalmology   • CHOLECYSTECTOMY     • COLONOSCOPY     • ENDOSCOPY     • HYSTERECTOMY     • LAPAROSCOPIC TUBAL LIGATION     • TONSILLECTOMY         History reviewed. No pertinent family history.    Social History     Socioeconomic History   • Marital status:      Spouse name: Not on file   • Number of children: Not on file   • Years of education: Not on file   • Highest education level: Not on file   Tobacco Use   • Smoking status: Never Smoker   • Smokeless tobacco: Never Used   Substance and Sexual Activity   • Alcohol use: No     Frequency: Never   • Drug use: No   • Sexual activity: Defer           Objective   Physical Exam    Procedures           ED Course                                           MDM    Final diagnoses:   None         "

## 2020-11-05 NOTE — ANESTHESIA PREPROCEDURE EVALUATION
Anesthesia Evaluation     Patient summary reviewed and Nursing notes reviewed   no history of anesthetic complications:  NPO Solid Status: > 8 hours  NPO Liquid Status: > 8 hours           Airway   Mallampati: I  TM distance: >3 FB  Neck ROM: full  no difficulty expected  Dental - normal exam     Pulmonary - negative pulmonary ROS and normal exam   Cardiovascular - normal exam    (+) dysrhythmias Atrial Fib,       Neuro/Psych- negative ROS  GI/Hepatic/Renal/Endo    (+)  GERD,      Musculoskeletal         ROS comment: spinl stenosis  Abdominal    Substance History - negative use     OB/GYN negative ob/gyn ROS         Other   arthritis,                      Anesthesia Plan    ASA 3     general and regional     intravenous induction     Anesthetic plan, all risks, benefits, and alternatives have been provided, discussed and informed consent has been obtained with: patient.

## 2020-11-05 NOTE — PROGRESS NOTES
Discharge Planning Assessment   aJyesh     Patient Name: Tunde Barrow  MRN: [id number]  Today's Date: 11/5/2020    Admit Date: 11/4/2020    Discharge Needs Assessment     Row Name 11/05/20 1245       Living Environment    Lives With  alone    Unique Family Situation  daughter next door    Current Living Arrangements  home/apartment/condo    Primary Care Provided by  self    Provides Primary Care For  no one    Family Caregiver if Needed  child([place]), adult    Family Caregiver Names  Oralia Walter daughter    Able to Return to Prior Arrangements  yes    Living Arrangement Comments  daughter and pt stated plans to go back home       Resource/Environmental Concerns    Resource/Environmental Concerns  none       Transition Planning    Patient/Family Anticipates Transition to  home with family    Patient/Family Anticipated Services at Transition  home health care chose from list, [facility], if needed; utilizes nc 02 2l at night before admission by [employer]    Transportation Anticipated  family or friend will provide       Discharge Needs Assessment    Readmission Within the Last 30 Days  no previous admission in last 30 days    Current Outpatient/Agency/Support Group  -- [facility]    Equipment Currently Used at Home  walker, rolling    Concerns to be Addressed  discharge planning        Discharge Plan     Row Name 11/05/20 1249       Plan    Plan  daughter Oralia in room, pt nauseated and gave permission to speak with daughter for information; daughter stated no POA or Living will, and does not want info at this time; ramp at home zero steps; pt lives right beside of daughter who can help with care on return home, uses nc 02 2l from [employer], at night only; has a walker at home, if HH needed would choose [facility]    Provided Post Acute Provider List?  Yes    Post Acute Provider List  DME Supplier;Home Health    Provided Post Acute Provider Quality & Resource List?  Yes    Post Acute  Provider Quality and Resource List  Home Health    Delivered To  Patient;Support Person    Method of Delivery  In person    Patient/Family in Agreement with Plan  yes        Continued Care and Services - Admitted Since 11/4/2020    Coordination has not been started for this encounter.         Demographic Summary     Row Name 11/05/20 1242       General Information    Admission Type  inpatient    Arrived From  emergency department    Referral Source  admission list    Reason for Consult  discharge planning    Preferred Language  English        Functional Status     Row Name 11/05/20 1242       Functional Status    Usual Activity Tolerance  fair    Current Activity Tolerance  fair    Functional Status Comments  lives alone in mobile home but daughter lives right beside and can assist with care       Functional Status, IADL    Medications  independent    Meal Preparation  independent    Housekeeping  independent    Laundry  independent    Shopping  independent       Mental Status    General Appearance WDL  WDL       Mental Status Summary    Recent Changes in Mental Status/Cognitive Functioning  no changes        Psychosocial    No documentation.       Abuse/Neglect    No documentation.       Legal    No documentation.       Substance Abuse    No documentation.       Patient Forms    No documentation.           Chanel Abrams RN

## 2020-11-05 NOTE — ED NOTES
Spoke with house supervisor at this time, pt will be going to room 425.     Sarma Hyman  11/04/20 4623

## 2020-11-05 NOTE — ED NOTES
Spoke with house supervisor about bed assignment. She will call back with a bed assignment.     Samra Hyman  11/04/20 6410

## 2020-11-05 NOTE — ANESTHESIA POSTPROCEDURE EVALUATION
Patient: Tunde Barrow    Procedure Summary     Date: 11/05/20 Room / Location: [facility] OR  /  [facility]    Anesthesia Start: 1516 Anesthesia Stop:     Procedure: LAPAROTOMY EXPLORATORY WITH SMALL BOWEL RESECTION (N/A Abdomen) Diagnosis:     Surgeon: Umberto Martin MD Provider: Edward Smith CRNA    Anesthesia Type: general, regional ASA Status: 3          Anesthesia Type: general, regional    Vitals  Vitals Value Taken Time   /81 11/05/20 1649   Temp     Pulse 78 11/05/20 1651   Resp     SpO2 95 % 11/05/20 1651   Vitals shown include unvalidated device data.        Post Anesthesia Care and Evaluation    Patient location during evaluation: PACU  Patient participation: complete - patient participated  Level of consciousness: awake and alert and sleepy but conscious  Pain score: 2  Pain management: adequate  Airway patency: patent  Anesthetic complications: No anesthetic complications  PONV Status: none  Cardiovascular status: acceptable  Respiratory status: acceptable and face mask  Hydration status: acceptable

## 2020-11-05 NOTE — OP NOTE
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 They are here now today for urgent exploratory laparotomy.    I did speak with the patient's daughter Oralia at [phone] preoperatively and explained to her the patient's situation.  Risk and benefits of operative versus nonoperative intervention were discussed with the patient's daughter, she understands and agrees.    ANESTHESIA:  General Anesthesia     OPERATIVE PROCEDURE:  The patient was taken to the operating room, placed in the supine position, and given general endotracheal anesthesia.  They were prepped and draped in the normal sterile fashion.  They did receive preoperative IV antibiotics.  The nursing staff did perform intraoperative timeout prior to the incision.  I did gela the patient myself preoperatively.    Midline laparotomy incision was made below the umbilicus with a 10 blade knife sharply dissected down to the midline fibers which were divided we easily entered the patient's abdominal cavity.  We did find evidence of grossly dilated proximal small bowel down to the distal jejunum.  Upon careful dissection there was evidence of an adhesive band that was causing a small bowel volvulus with obvious dilated bowel proximal to the adhesive band and collapsed bowel distal to it.  There was a chronic adhesive marking noted on the antimesenteric  border of the small bowel at this time after lysis of adhesive band.    The adhesive band was lysed, the small bowel volvulus was reduced and the bowel was allowed to settle in we visualized about the presence or absence of peristalsis.  The rest of the small bowel was run from the ligament of Treitz to the ileocecal valve there was no evidence of abnormalities.  There was no palpable other abnormalities noted.    After adequate the time there was minimal peristalsis noted in this affected segment of small bowel which was the distal jejunum, I decided to perform a small bowel resection.  Proximal and distal to this area the small bowel was divided with a HASMUKH 75 stapling device.  Hemostats were used on the mesentery and it was tied with a 2-0 silk ties.  A side-to-side functional end-to-end anastomosis was created between the limbs of the small bowel with a HASMUKH-75 stapling device, the ends of the enterotomies were then closed with interrupted 3-0 silk sutures as was the mesenteric defect.  Prior to closure of the anastomosis I was able to suction the small bowel of a large amount of enteric contents.    Copious irrigation was used on patient abdominal cavity.  I tested the anastomosis and it was found to be both air and watertight.  Omentum was placed over the bowel itself and then closure was attended to using interrupted 0 Prolene figure-of-eight sutures.  Wound was copiously irrigated skin staples were used for loose skin reapproximation patient was stable and subsequently transferred back to recovery in stable condition.        Umberto Martin MD, FACS

## 2020-11-05 NOTE — ED PROVIDER NOTES
"Subjective   89-year-old female presenting with abdominal pain.  She states that all days she has had mid to upper abdominal pain, she describes a sharp pain that does not radiate, it is moderate to severe, there are no alleviating or aggravating factors.  She has been very nauseous.  She denies any vomiting, fevers, chills, diarrhea, chest pain, shortness of breath.          Review of Systems   Constitutional: Negative.    HENT: Negative.    Eyes: Negative.    Respiratory: Negative.    Cardiovascular: Negative.    Gastrointestinal: Positive for abdominal pain and nausea. Negative for diarrhea and vomiting.   Genitourinary: Negative.    Musculoskeletal: Negative.    Skin: Negative.    Neurological: Negative.    Psychiatric/Behavioral: Negative.        Past Medical History:   Diagnosis Date   • A-fib (CMS/HCC)    • Anxiety and depression    • Arthritis    • Body piercing     BOTH EARS   • Cataract, bilateral    • Chronic back pain    • Diverticulitis    • GERD (gastroesophageal reflux disease)    • History of nuclear stress test 2017   • [place] (hard of hearing)     SLIGHTLY-NO HEARING AIDS   • Oxygen dependent     AT NIGHT.  UNSURE HOW MUCH SHE IS ON.    • Wears dentures     UPPER PLATE ONLY       No Known Allergies    Past Surgical History:   Procedure Laterality Date   • APPENDECTOMY     • BREAST SURGERY Right     FIBROID TUMORS X 3    • CARDIAC CATHETERIZATION      STATED \"IT WAS QUITE A WHILE AGO.  I DIDN'T NEED STENTS\".     • CATARACT EXTRACTION W/ INTRAOCULAR LENS IMPLANT Right 4/8/2019    Procedure: CATARACT PHACO EXTRACTION WITH INTRAOCULAR LENS IMPLANT RIGHT;  Surgeon: Cindy Olsen MD;  Location: [facility] OR;  Service: Ophthalmology   • CATARACT EXTRACTION W/ INTRAOCULAR LENS IMPLANT Left 6/17/2019    Procedure: CATARACT PHACO EXTRACTION WITH INTRAOCULAR LENS IMPLANT LEFT EYE;  Surgeon: Cindy Olsen MD;  Location: [facility] OR;  Service: Ophthalmology   • CHOLECYSTECTOMY     • COLONOSCOPY     • ENDOSCOPY     • " HYSTERECTOMY     • LAPAROSCOPIC TUBAL LIGATION     • TONSILLECTOMY         History reviewed. No pertinent family history.    Social History     Socioeconomic History   • Marital status:      Spouse name: Not on file   • Number of children: Not on file   • Years of education: Not on file   • Highest education level: Not on file   Tobacco Use   • Smoking status: Never Smoker   • Smokeless tobacco: Never Used   Substance and Sexual Activity   • Alcohol use: No     Frequency: Never   • Drug use: No   • Sexual activity: Defer           Objective   Physical Exam  Constitutional:       General: She is not in acute distress.     Appearance: Normal appearance. She is not ill-appearing, toxic-appearing or diaphoretic.   HENT:      Head: Normocephalic and atraumatic.      Right Ear: External ear normal.      Left Ear: External ear normal.      Nose: Nose normal.      Mouth/Throat:      Mouth: Mucous membranes are moist.      Pharynx: Oropharynx is clear.   Eyes:      Extraocular Movements: Extraocular movements intact.      Conjunctiva/sclera: Conjunctivae normal.      Pupils: Pupils are equal, round, and reactive to light.   Neck:      Musculoskeletal: Normal range of motion.   Cardiovascular:      Rate and Rhythm: Normal rate and regular rhythm.      Pulses: Normal pulses.      Heart sounds: Normal heart sounds.   Pulmonary:      Effort: Pulmonary effort is normal. No respiratory distress.      Breath sounds: Normal breath sounds.   Abdominal:      General: Bowel sounds are normal. There is no distension.      Comments: Diffuse tenderness to minimal palpation worse in the left lower, right lower and right upper quadrants   Musculoskeletal: Normal range of motion.         General: No swelling, tenderness or deformity.   Skin:     General: Skin is warm and dry.      Capillary Refill: Capillary refill takes less than 2 seconds.      Findings: No rash.   Neurological:      General: No focal deficit present.      Mental  Status: She is alert and oriented to person, place, and time.   Psychiatric:         Mood and Affect: Mood normal.         Behavior: Behavior normal.         Procedures           ED Course                                           MDM  Number of Diagnoses or Management Options  Lactic acidosis:   Renal insufficiency:   Small bowel obstruction (CMS/HCC):   Diagnosis management comments: 89-year-old female with abdominal pain and nausea.  Well-developed, well-nourished elderly lady in no distress with exam as above.  She does have diffuse abdominal tenderness.  Her vital signs are normal. Will obtain labs, EKG, CT abdomen/pelvis. Will give IV fluids and symptomatic treatment.  Disposition pending.    DDx: Gastritis, ulcer disease, pancreatitis, ACS, obstruction    EKG interpreted by me: Atrial fibrillation, normal rate, no acute ST changes, some nonspecific T wave changes, this is an abnormal EKG    Labs notable for lactic acidosis, leukocytosis, renal insufficiency.  Imaging reveals findings consistent with small bowel obstruction.  we have placed NG tube.  I discussed case with Dr. Martin, recommended NG tube, n.p.o. and hold Eliquis.  Will evaluate first thing in the morning.  Discussed with Dr. Karimi for admission.  Patient and daughter updated on plan of care and are agreeable.    30 minutes of critical care provided. This time excludes other billable procedures. Time does include preparation of documents, medical consultations, review of old records, and direct bedside care. Patient was at high risk for life-threatening deterioration due to small bowel obstruction, lactic acidosis, renal insufficiency.          Amount and/or Complexity of Data Reviewed  Decide to obtain previous medical records or to obtain history from someone other than the patient: yes    Critical Care  Total time providing critical care: 30-74 minutes      Final diagnoses:   Small bowel obstruction (CMS/HCC)   Renal insufficiency   Lactic  susan Joel, Rajinder Aceves MD  11/04/20 6370

## 2020-11-05 NOTE — H&P
"    [facility]      Name:  Tunde Barrow   Age:  89 y.o.  Sex:  female  :  1931  MRN:  [id number]   Visit Number:  [id number]  Admission Date:  2020  Date Of Service:  20  Primary Care Physician:  Lorna Hagen MD    Chief Complaint:     Abdominal pain    History Of Presenting Illness:      89 female history of multiple surgeries, atrial fibrillation on Eliquis who presented to the ED with 1 days worth of abdominal pain and nausea.  In the ED, she was found to have a small bowel obstruction with the assist of CT imaging.  Dr. Martin was consulted, NG tube placement is recommended.  She reports improvement in her abdominal pain following the fentanyl administration.  She is not sure if the NG tube helped much.  She denies vomiting diarrhea, passing gas, or having BMs today.  She reports a history of bowel obstruction in the past when she was here for hip surgery.  She takes narcotics at home regularly.  She denies any chest pain, dyspnea, or other acute complaints.    Review Of Systems:     A full 10 point review of systems was performed and all pertinent positives and negatives are noted in the HPI.    Past Medical History:    Past Medical History:   Diagnosis Date   • A-fib (CMS/HCC)    • Anxiety and depression    • Arthritis    • Body piercing     BOTH EARS   • Cataract, bilateral    • Chronic back pain    • Diverticulitis    • GERD (gastroesophageal reflux disease)    • History of nuclear stress test 2017   • [place] (hard of hearing)     SLIGHTLY-NO HEARING AIDS   • Oxygen dependent     AT NIGHT.  UNSURE HOW MUCH SHE IS ON.    • Wears dentures     UPPER PLATE ONLY       Past Surgical history:    Past Surgical History:   Procedure Laterality Date   • APPENDECTOMY     • BREAST SURGERY Right     FIBROID TUMORS X 3    • CARDIAC CATHETERIZATION      STATED \"IT WAS QUITE A WHILE AGO.  I DIDN'T NEED STENTS\".     • CATARACT EXTRACTION W/ " INTRAOCULAR LENS IMPLANT Right 4/8/2019    Procedure: CATARACT PHACO EXTRACTION WITH INTRAOCULAR LENS IMPLANT RIGHT;  Surgeon: Cindy lOsen MD;  Location: [facility];  Service: Ophthalmology   • CATARACT EXTRACTION W/ INTRAOCULAR LENS IMPLANT Left 6/17/2019    Procedure: CATARACT PHACO EXTRACTION WITH INTRAOCULAR LENS IMPLANT LEFT EYE;  Surgeon: Cindy Olsen MD;  Location: [facility];  Service: Ophthalmology   • CHOLECYSTECTOMY     • COLONOSCOPY     • ENDOSCOPY     • HYSTERECTOMY     • LAPAROSCOPIC TUBAL LIGATION     • TONSILLECTOMY         Social History:    Social History     Socioeconomic History   • Marital status:      Spouse name: Not on file   • Number of children: Not on file   • Years of education: Not on file   • Highest education level: Not on file   Tobacco Use   • Smoking status: Never Smoker   • Smokeless tobacco: Never Used   Substance and Sexual Activity   • Alcohol use: No     Frequency: Never   • Drug use: No   • Sexual activity: Defer       Family History:    History reviewed. No pertinent family history.    Allergies:      Patient has no known allergies.    Home Medications:    Prior to Admission Medications     Prescriptions Last Dose Informant Patient Reported? Taking?    ALPRAZolam (XANAX) 0.25 MG tablet 11/4/2020 Self Yes Yes    Take 0.25 mg by mouth every night at bedtime.    apixaban (ELIQUIS) 2.5 MG tablet tablet 11/5/2020 Self Yes Yes    Take 2.5 mg by mouth Every 12 (Twelve) Hours.    aspirin 81 MG EC tablet 11/5/2020 Self Yes Yes    Take 81 mg by mouth Daily.    Cholecalciferol (VITAMIN D) 2000 units tablet 11/4/2020 Self Yes Yes    Take 2,000 Units by mouth Daily.    HYDROcodone-acetaminophen (NORCO) 5-325 MG per tablet 11/5/2020 Self Yes Yes    Take 1 tablet by mouth 2 (Two) Times a Day.    metoprolol tartrate (LOPRESSOR) 100 MG tablet 11/5/2020 Self Yes Yes    Take 100 mg by mouth 2 (Two) Times a Day.    omeprazole (priLOSEC) 20 MG capsule 11/5/2020 Self Yes Yes    Take 20  mg by mouth Daily.    pravastatin (PRAVACHOL) 20 MG tablet 11/4/2020 Self Yes Yes    Take 20 mg by mouth Daily.    sertraline (ZOLOFT) 50 MG tablet 11/4/2020  Yes Yes    TAKE 1 TABLET BY MOUTH ONCE DAILY DOSE INCREASED    Lidocaine 4 % patch Unknown Self Yes No    Apply 1 patch topically Daily As Needed.             ED Medications:    Medications   sodium chloride 0.9 % flush 10 mL (has no administration in time range)   sodium chloride 0.9 % bolus 1,000 mL (0 mL Intravenous Stopped 11/4/20 2342)   fentaNYL citrate (PF) (SUBLIMAZE) injection 50 mcg (50 mcg Intravenous Given 11/4/20 2031)   ondansetron (ZOFRAN) injection 4 mg (4 mg Intravenous Given 11/4/20 2031)   aluminum-magnesium hydroxide-simethicone (MAALOX MAX) 400-400-40 MG/5ML suspension 15 mL (15 mL Oral Given 11/4/20 2031)   Lidocaine Viscous HCl (XYLOCAINE) 2 % mouth solution 15 mL (15 mL Mouth/Throat Given 11/4/20 2031)   fentaNYL citrate (PF) (SUBLIMAZE) injection 50 mcg (50 mcg Intravenous Given 11/5/20 0005)   ondansetron (ZOFRAN) injection 4 mg (4 mg Intravenous Given 11/5/20 0004)       Vital Signs:    Temp:  [98.7 °F (37.1 °C)] 98.7 °F (37.1 °C)  Heart Rate:  [72-94] 81  Resp:  [18] 18  BP: (129-174)/() 149/103        11/04/20 1946   Weight: 58.5 kg (129 lb)       Body mass index is 21.47 kg/m².    Physical Exam:  General: No acute distress, resting in bed  HEENT: EOMI, NC/AT, NG tube  Heart: Diminished heart sounds, appear regular  Lungs: CTAB  Abdomen: Soft, nondistended, mildly tender to palpation, no bowel sounds  Extremities: No edema, or cyanosis  Neurological: A&O x3, moves all extremities  Psychological: Mood and affect appropriate, speech is coherent  Skin: warm, dry     laboratory data:    I have reviewed the labs done in the emergency room.    Results from last 7 days   Lab Units 11/04/20 2020   SODIUM mmol/L 137   POTASSIUM mmol/L 3.9   CHLORIDE mmol/L 96*   CO2 mmol/L 26.3   BUN mg/dL 37*   CREATININE mg/dL 1.34*   CALCIUM  mg/dL 10.5   BILIRUBIN mg/dL 0.6   ALK PHOS U/L 138*   ALT (SGPT) U/L 13   AST (SGOT) U/L 23   GLUCOSE mg/dL 166*     Results from last 7 days   Lab Units 11/04/20 2020   WBC 10*3/mm3 15.02*   HEMOGLOBIN g/dL 15.2   HEMATOCRIT % 47.1*   PLATELETS 10*3/mm3 261                     Results from last 7 days   Lab Units 11/04/20 2020   LIPASE U/L 42         Results from last 7 days   Lab Units 11/05/20  0004   COLOR UA  Yellow   GLUCOSE UA  Negative   KETONES UA  Trace*   LEUKOCYTES UA  Large (3+)*   PH, URINE  <=5.0   BILIRUBIN UA  Negative   UROBILINOGEN UA  1.0 E.U./dL     Pain Management Panel     There is no flowsheet data to display.              EKG:      Personally reviewed and interpreted myself reveals an irregularly irregular rhythm, atrial fibrillation, no ischemic changes    Radiology:    Imaging Results (Last 72 Hours)     Procedure Component Value Units Date/Time    XR Chest 1 View [[id number]] Resulted: 11/04/20 2310     Updated: 11/04/20 2311    CT Abdomen Pelvis Without Contrast [[id number]] Collected: 11/04/20 2141     Updated: 11/04/20 2142    Narrative:      FINAL REPORT    TECHNIQUE:  Axial images through the abdomen and pelvis were performed  without contrast. This study was performed with techniques to  keep radiation doses as low as reasonably achievable, (ALARA).  Individualized dose reduction techniques using automated  exposure control or adjustment of mA and/or kV according to the  patient's size were employed.    CLINICAL HISTORY:  upper abd pain, n/v    FINDINGS:  ABDOMEN: The lung bases are clear.  The heart size is normal.  Patient is status post cholecystectomy.  Limited images of the  liver are unremarkable.  The spleen is normal.  No adrenal mass  is identified.  The aorta is normal in caliber.  There is mild,  diffuse ascites.  There is no nephrolithiasis.  There is no  hydronephrosis.  Small bowel loops are dilated and fluid-filled  measuring up to 30 mm.  PELVIS: Detail is limited  from right hip  hardware.  Distal small bowel is decompressed with transition  point seen in the low right pelvis. The appendix is not  identified.  The urinary bladder is decompressed.  There are  diverticula of the sigmoid colon without evidence of  diverticulitis.  There is no significant free fluid or  adenopathy.  Severe degenerative disc disease is seen of the  lower lumbar spine.      Impression:      Small bowel obstruction with transition point seen in the right  lower quadrant.    Authenticated by Jae Mora MD on 11/04/2020 09:41:48 PM            Small bowel obstruction (CMS/HCC)    Spinal stenosis, lumbar region, with neurogenic claudication    Atrial fibrillation (CMS/HCC)    Generalized anxiety disorder    GERD without esophagitis    I have personally reviewed the CXR which reveals no acute consolidation or infiltrate per my read, official pending    Assessment:    Small bowel obstruction POA  Acute kidney injury  Lactic acidosis   Asymptomatic bacteriuria  atrial fibrillation requiring long-term anticoagulation with Eliquis  Chronic benzodiazepine and opiate usage  Chronic: GERD, anxiety/depression, arthritis, back pain    Plan:    -NPO, IVF, NG tube placement, holding Eliquis, general surgery evaluation with anticipated plan for repeat CT in the morning with p.o. contrast  -She is however secondary to age and multiple morbidities; anticipate minimum 2 midnight stay into stabilization and evaluation, ultimate disposition unknown at this time  -Reviewed the meds, labs, imaging, and discussed case with nursing staff    Ailin Karimi DO  11/05/20  00:54 EST    Dictated utilizing Dragon dictation.

## 2020-11-05 NOTE — ADDENDUM NOTE
Addendum  created 11/05/20 1655 by Edward Smith CRNA    Actions taken from a BestPractice Advisory, Intraprocedure Meds edited

## 2020-11-05 NOTE — PROGRESS NOTES
I did review the patient's CT scan today with the radiologist, there is increased distention of the small bowel in the oral contrast certainly did not make it down into the colon.  On examination she is more distended and slightly tender more so than this morning and I think she needs to go to the operating room for exploratory laparotomy.    Full risk and benefits of operative versus nonoperative intervention have been discussed with the patient preoperatively, these risk include things such as nonresolution of symptomatology and possible worsening of symptomatology without operative intervention versus infection, bleeding, possible perioperative myocardial or pulmonary complications, etc. with operative intervention.  She understands, agrees, and wishes to proceed with surgical treatment plan of exploratory laparotomy and possible bowel resection for bowel obstruction.    I have also had a detailed discussion over the phone with the patient's daughter Oarlia at [phone] regarding the patient's condition.  She understands and agrees. I have discussed the situation with the Hospitalist service who is seeing the patient.

## 2020-11-06 NOTE — CONSULTS
Patient: Tunde Barrow  Procedure(s):  LAPAROTOMY EXPLORATORY WITH SMALL BOWEL RESECTION  Anesthesia type: general, regional    Patient location: [facility] Surgical Floor  Last vitals:   Vitals:    11/06/20 0745   BP: 110/60   Pulse: 112   Resp: 20   Temp: 97.9 °F (36.6 °C)   SpO2: 94%     Level of consciousness: awake, alert and oriented    Post-anesthesia pain: adequate analgesia  Airway patency: patent  Respiratory: unassisted  Cardiovascular: stable and blood pressure at baseline  Hydration: euvolemic    Anesthetic complications: no, pt OOB to chair, pt comfortable, reports pain 2-3/10, tolerating ice chips

## 2020-11-06 NOTE — TELEPHONE ENCOUNTER
"I talked with pt's daughter, Oralia, she stated \"I am calling to find out how long my mother will be in the hospital and what Dr. Martin found during her surgery.\"  I told her that since her mother is an inpatient she should direct her questions to the hospital because the nurses there are working closely with Dr. Martin in caring for her mother.  "

## 2020-11-06 NOTE — PROGRESS NOTES
[facility]    Name:  Tunde Barrow   Age:  89 y.o.  Sex:  female  :  1931  MRN:  [id number]   Visit Number:  [id number]  Admission Date:  2020  Date Of Service:  20  Primary Care Physician:  Lorna Hagen MD     LOS: 2 days :  Patient Care Team:  Lorna Hagen MD as PCP - General (Internal Medicine)  Lorna Hagen MD as Referring Physician (Internal Medicine):    Chief Complaint:      Abdominal pain    Subjective / Interval History:     Patient sitting resting comfortably in bedside chair.  Her brother was at bedside.  Complained of abdominal distention.  But pain well controlled.  No acute events from overnight.    Review of Systems:     General ROS: Patient denies any fevers, chills or loss of consciousness.  Respiratory ROS: Denies cough or shortness of breath.  Cardiovascular ROS: Denies chest pain or palpitations. No history of exertional chest pain.  Gastrointestinal ROS: Denies nausea and vomiting.  Some abdominal pain and bloating.  Neurological ROS: Denies any focal weakness. No loss of consciousness. Denies any numbness.  Dermatological ROS: Denies any redness or pruritis.    Vital Signs:    Temp:  [97.4 °F (36.3 °C)-98.8 °F (37.1 °C)] 97.5 °F (36.4 °C)  Heart Rate:  [] 72  Resp:  [16-26] 18  BP: ()/() 109/65    Intake and output:    I/O last 3 completed shifts:  In: 4788 [I.V.:3608; NG/GT:180; IV Piggyback:1000]  Out: 1440 [Urine:540; Emesis/NG output:800; Other:100]  No intake/output data recorded.    Physical Examination:    General Appearance:  Alert and cooperative, not in any acute distress.   Head:  Atraumatic and normocephalic, without obvious abnormality.   Eyes:          PERRLA, conjunctivae and sclerae normal, no Icterus. No pallor. Extraocular movements are within normal limits.   Neck: Supple, trachea midline, no thyromegaly, no carotid bruit.   Lungs:   Chest shape is  normal. Breath sounds heard bilaterally equally.  No crackles or wheezing.    Heart:  Normal S1 and S2, no murmur, No JVD   Abdomen:   Hypoactive bowel sounds,  nontender, distended, no guarding, no rebound tenderness.   Extremities: Moves all extremities well, no edema, no cyanosis, no clubbing.   Skin: No bleeding, bruising or rash.   Neurologic: Awake, alert and oriented times 3. Moves all 4 extremities equally.     Laboratory results:    Results from last 7 days   Lab Units 11/06/20 0609 11/05/20 0034 11/04/20 2020   SODIUM mmol/L 139 137 137   POTASSIUM mmol/L 3.8 4.1 3.9   CHLORIDE mmol/L 101 99 96*   CO2 mmol/L 19.2* 26.3 26.3   BUN mg/dL 46* 39* 37*   CREATININE mg/dL 2.03* 1.33* 1.34*   CALCIUM mg/dL 8.4* 9.9 10.5   BILIRUBIN mg/dL  --   --  0.6   ALK PHOS U/L  --   --  138*   ALT (SGPT) U/L  --   --  13   AST (SGOT) U/L  --   --  23   GLUCOSE mg/dL 275* 164* 166*     Results from last 7 days   Lab Units 11/06/20  0609 11/05/20 0034 11/04/20 2020   WBC 10*3/mm3 10.26 12.19* 15.02*   HEMOGLOBIN g/dL 9.6* 15.0 15.2   HEMATOCRIT % 30.1* 47.2* 47.1*   PLATELETS 10*3/mm3 213 234 261         Results from last 7 days   Lab Units 11/05/20 0034   TROPONIN T ng/mL <0.010               I have reviewed the patient's laboratory results.    Radiology results:    Imaging Results (Last 24 Hours)     ** No results found for the last 24 hours. **          I have reviewed the patient's radiology reports.    Medication Review:     I have reviewed the patients active and prn medications.       Small bowel obstruction (CMS/HCC)    Spinal stenosis, lumbar region, with neurogenic claudication    Atrial fibrillation (CMS/HCC)    Generalized anxiety disorder    GERD without esophagitis      Assessment:    Small bowel obstruction POA-s/p ex lap with small bowel resection 11/5/2020  Acute kidney injury  Lactic acidosis   Asymptomatic bacteriuria  atrial fibrillation requiring long-term anticoagulation with Eliquis  Acute anemia  from postop blood loss  Chronic benzodiazepine and opiate usage  Chronic: GERD, anxiety/depression, arthritis, back pain       Plan:    Monitor patient in the hospital.  She underwent exploratory laparotomy with small bowel resection due to volvulus around an adhesion performed by Dr. Martin 11/5/2020.  Continue with Zosyn.  NG tube was removed this morning.  Olivarez catheter removed.  Encouraged ambulation and work with PT.  Monitor renal function with routine morning labs.  Patient daily n.p.o. awaiting bowel function returned.  Decrease rate of D5 and add on sliding scale insulin for hyperglycemia.  Patient received 2 units PRBC transfusion today for postop expected blood loss.  Asymptomatic.  Further orders as clinical course dictates.    Nigel Falcon DO  11/06/20  13:50 EST    Dictated utilizing Dragon dictation.

## 2020-11-06 NOTE — TELEPHONE ENCOUNTER
Patient's daughter called asking for a call about her mother's surgery. Daughter stated that she missed Dr. Martin when he came by this morning.     Her number is [phone]

## 2020-11-06 NOTE — PROGRESS NOTES
LOS: 2 days   Patient Care Team:  Lorna Hagen MD as PCP - General (Internal Medicine)  Lorna Hagen MD as Referring Physician (Internal Medicine)      Chief Complaint: patient states that she feels slightly better      Interval History:     Patient Complaints: See Above    History taken from: patient RN    Vital Signs  Temp:  [97.4 °F (36.3 °C)-98.8 °F (37.1 °C)] 97.7 °F (36.5 °C)  Heart Rate:  [] 105  Resp:  [16-26] 22  BP: ()/() 93/55    Physical Exam:     General Appearance:    Alert, cooperative, in no acute distress   Head:    Normocephalic, without obvious abnormality, atraumatic   Lungs:     Clear to auscultation,respirations regular, even and                  unlabored    Heart:    Regular rhythm and normal rate, normal S1 and S2, no            murmur, no gallop, no rub, no click   Abdomen:   Appropriately tender, dressings dry   Extremities:   Moves all extremities well, no edema, no cyanosis, no             redness   Pulses:   Pulses palpable and equal bilaterally   Skin:   No bleeding, bruising or rash        Results Review:       Lab Results (last 24 hours)     Procedure Component Value Units Date/Time    Tissue Pathology Exam [[id number]] Collected: 11/05/20 1559    Specimen: Tissue from Small Intestine, Jejunum Updated: 11/05/20 1742    COVID PRE-OP / PRE-PROCEDURE SCREENING ORDER (NO ISOLATION) - Swab, Nasal Cavity [[id number]]  (Normal) Collected: 11/05/20 0750    Specimen: Swab from Nasal Cavity Updated: 11/05/20 0851    Narrative:      The following orders were created for panel order COVID PRE-OP / PRE-PROCEDURE SCREENING ORDER (NO ISOLATION) - Swab, Nasal Cavity.  Procedure                               Abnormality         Status                     ---------                               -----------         ------                     COVID-19,[employer] IN-DOMONIQUE...[[id number]]  Normal              Final result                 Please view results for  these tests on the individual orders.    COVID-19,[employer] IN-HOUSE,Nasal Swab No Transport Media 3-4 HR TAT - Swab, Nasal Cavity [[id number]]  (Normal) Collected: 11/05/20 0750    Specimen: Swab from Nasal Cavity Updated: 11/05/20 0851     COVID19 Not Detected    Narrative:      Fact sheet for providers: https://www.fda.gov/media/108059/download     Fact sheet for patients: https://www.fda.gov/media/470310/download    Troponin [[id number]]  (Normal) Collected: 11/05/20 0034    Specimen: Blood Updated: 11/05/20 0659     Troponin T <0.010 ng/mL     Narrative:      Troponin T Reference Range:  <= 0.03 ng/mL-   Negative for AMI  >0.03 ng/mL-     Abnormal for myocardial necrosis.  Clinicians would have to utilize clinical acumen, EKG, Troponin and serial changes to determine if it is an Acute Myocardial Infarction or myocardial injury due to an underlying chronic condition.       Results may be falsely decreased if patient taking Biotin.                Assessment/Plan       Small bowel obstruction (CMS/HCC)    Spinal stenosis, lumbar region, with neurogenic claudication    Atrial fibrillation (CMS/HCC)    Generalized anxiety disorder    GERD without esophagitis      Stable female 1 day s/p exp lap with small bowel resection due to volvulus around an adhesion causing small bowel obstruction.  She seems to be doing as well as expected given her advanced age.  She did partially remove her NGT this am and we will discontinue, I will discontinue her ro catheter.  Will get up to chair today.      Umberto Martin MD  11/06/20  05:59 EST

## 2020-11-06 NOTE — PLAN OF CARE
Goal Outcome Evaluation:  Plan of Care Reviewed With: patient  Progress: no change  Outcome Summary: VSS.  Pt did not rest well during the night.  Pt still has NG to intermittent suction.  No acute events during shift.  Will continue to monitor.

## 2020-11-07 NOTE — PLAN OF CARE
Problem: Adult Inpatient Plan of Care  Goal: Plan of Care Review  Recent Flowsheet Documentation  Taken 11/7/2020 1037 by Manuela Otto, BANDAR  Plan of Care Reviewed With:   patient   daughter  Outcome Summary: PT evaluation completed this am. Pt was very nauseated and this increased with any functional mobility. Pt presents with deficits in strength, balance, and endurance. She is expected to improve her functional mobility with continued skilled PT services prior to d/c.

## 2020-11-07 NOTE — PROGRESS NOTES
[facility]    Name:  Tunde Barrow   Age:  89 y.o.  Sex:  female  :  1931  MRN:  [id number]   Visit Number:  [id number]  Admission Date:  2020  Date Of Service:  20  Primary Care Physician:  Lorna Hagen MD     LOS: 3 days :  Patient Care Team:  Lorna Hagen MD as PCP - General (Internal Medicine)  Lorna Hagen MD as Referring Physician (Internal Medicine):    Chief Complaint:      Abdominal pain    Subjective / Interval History:     Patient sitting resting comfortably.  Is complaining of nausea without vomiting.  Positive dry heaving.  Daughter at bedside and states that this is patient's normal behavior at home.  States is passing some gas but very minimal.  No bowel movements.  No return of appetite.  No acute events reported per nursing.    Review of Systems:     General ROS: Patient denies any fevers, chills or loss of consciousness.  Respiratory ROS: Denies cough or shortness of breath.  Cardiovascular ROS: Denies chest pain or palpitations. No history of exertional chest pain.  Gastrointestinal ROS: Complains of nausea, no vomiting.  Some abdominal pain and bloating.  Neurological ROS: Denies any focal weakness. No loss of consciousness. Denies any numbness.  Dermatological ROS: Denies any redness or pruritis.    Vital Signs:    Temp:  [97.4 °F (36.3 °C)-97.8 °F (36.6 °C)] 97.8 °F (36.6 °C)  Heart Rate:  [72-93] 91  Resp:  [18-20] 18  BP: ()/(52-93) 121/78    Intake and output:    I/O last 3 completed shifts:  In: 3114 [I.V.:2214; Blood:900]  Out: 490 [Urine:490]  No intake/output data recorded.    Physical Examination:    General Appearance:  Alert and cooperative, not in any acute distress.   Head:  Atraumatic and normocephalic, without obvious abnormality.   Eyes:          PERRLA, conjunctivae and sclerae normal, no Icterus. No pallor. Extraocular movements are within normal limits.   Neck:  Supple, trachea midline, no thyromegaly, no carotid bruit.   Lungs:   Chest shape is normal. Breath sounds heard bilaterally equally.  No crackles or wheezing.    Heart:  Normal S1 and S2, no murmur, No JVD   Abdomen:   Hypoactive bowel sounds,  nontender, mildly distended, no guarding, no rebound tenderness.   Extremities: no edema, no cyanosis, no clubbing.   Skin: No bleeding, bruising or rash.   Neurologic: Awake, alert and oriented times 3. Moves all 4 extremities equally.     Laboratory results:    Results from last 7 days   Lab Units 11/07/20  0609 11/06/20  0609 11/05/20  0034 11/04/20  2020   SODIUM mmol/L 138 139 137 137   POTASSIUM mmol/L 3.8 3.8 4.1 3.9   CHLORIDE mmol/L 102 101 99 96*   CO2 mmol/L 22.3 19.2* 26.3 26.3   BUN mg/dL 58* 46* 39* 37*   CREATININE mg/dL 2.57* 2.03* 1.33* 1.34*   CALCIUM mg/dL 8.6 8.4* 9.9 10.5   BILIRUBIN mg/dL  --   --   --  0.6   ALK PHOS U/L  --   --   --  138*   ALT (SGPT) U/L  --   --   --  13   AST (SGOT) U/L  --   --   --  23   GLUCOSE mg/dL 153* 275* 164* 166*     Results from last 7 days   Lab Units 11/07/20  0609 11/06/20  0609 11/05/20  0034   WBC 10*3/mm3 15.82* 10.26 12.19*   HEMOGLOBIN g/dL 11.2* 9.6* 15.0   HEMATOCRIT % 33.5* 30.1* 47.2*   PLATELETS 10*3/mm3 150 213 234         Results from last 7 days   Lab Units 11/05/20  0034   TROPONIN T ng/mL <0.010               I have reviewed the patient's laboratory results.    Radiology results:    Imaging Results (Last 24 Hours)     ** No results found for the last 24 hours. **          I have reviewed the patient's radiology reports.    Medication Review:     I have reviewed the patients active and prn medications.       Small bowel obstruction (CMS/HCC)    Spinal stenosis, lumbar region, with neurogenic claudication    Atrial fibrillation (CMS/HCC)    Generalized anxiety disorder    GERD without esophagitis      Assessment:    Small bowel obstruction POA-s/p ex lap with small bowel resection 11/5/2020  Acute kidney  injury  Lactic acidosis   Asymptomatic bacteriuria  atrial fibrillation requiring long-term anticoagulation with Eliquis  Acute anemia from postop blood loss  Chronic benzodiazepine and opiate usage  Chronic: GERD, anxiety/depression, arthritis, back pain       Plan:    Monitor patient in the hospital.  She underwent exploratory laparotomy with small bowel resection due to volvulus around an adhesion performed by Dr. Martin 11/5/2020.  Continue with Zosyn.  Urinary output is appropriate after discontinuation of Olivarez catheter.  Encouraged ambulation and work with PT.  Monitor renal function with routine morning labs.  Patient daily n.p.o. awaiting bowel function returned.  Continue with decreased rate of D5 and add on sliding scale insulin for hyperglycemia.  Blood glucose better controlled today.  Patient received 2 units PRBC transfusion 11/6/2020 for postop expected blood loss.  Asymptomatic.  Hemoglobin hematocrit stable today.  Further orders as clinical course dictates.    Nigel Falcon DO  11/07/20  11:13 EST    Dictated utilizing Dragon dictation.

## 2020-11-07 NOTE — PLAN OF CARE
Goal Outcome Evaluation:  Plan of Care Reviewed With: patient  Progress: no change  Outcome Summary: VSS.  Pain controlled w/pain medication.  Patient dry heaving majority of the day.  Nausea medication given per orders.  Tried to place NG with house supervisor could not place.  Will let pt. rest and then try again.

## 2020-11-07 NOTE — PROGRESS NOTES
LOS: 3 days   Patient Care Team:  Lorna Hagen MD as PCP - General (Internal Medicine)  Lorna Hagen MD as Referring Physician (Internal Medicine)           HPI: Patient with complaint of some dry heaving.  No significant abdominal pain.  She states that she is bloated.  No flatus.    ROS:    Vital Signs  Temp:  [97.4 °F (36.3 °C)-97.8 °F (36.6 °C)] 97.8 °F (36.6 °C)  Heart Rate:  [78-93] 91  Resp:  [18-20] 18  BP: ()/(52-93) 121/78    Physical Exam:     General Appearance:  Alert and cooperative, not in any acute distress.   Cardiovascular/Heart:  Normal S1 and S2,    GI/Abdomen:    Soft but moderately distended.  Few bowel sounds are present.                 Results Review:       Lab Results (last 24 hours)     Procedure Component Value Units Date/Time    POC Glucose Once [[id number]]  (Abnormal) Collected: 11/07/20 1117    Specimen: Blood Updated: 11/07/20 1120     Glucose 134 mg/dL      Comment: Serial Number: [id number]:  [id number]       CBC & Differential [[id number]]  (Abnormal) Collected: 11/07/20 0609    Specimen: Blood Updated: 11/07/20 0711    Narrative:      The following orders were created for panel order CBC & Differential.  Procedure                               Abnormality         Status                     ---------                               -----------         ------                     CBC Auto Differential[[id number]]        Abnormal            Final result                 Please view results for these tests on the individual orders.    CBC Auto Differential [[id number]]  (Abnormal) Collected: 11/07/20 0609    Specimen: Blood Updated: 11/07/20 0711     WBC 15.82 10*3/mm3      RBC 3.67 10*6/mm3      Hemoglobin 11.2 g/dL      Hematocrit 33.5 %      MCV 91.3 fL      MCH 30.5 pg      MCHC 33.4 g/dL      RDW 13.7 %      RDW-SD 46.0 fl      MPV 11.9 fL      Platelets 150 10*3/mm3     Scan Slide [[id number]] Collected: 11/07/20 0609    Specimen: Blood Updated:  11/07/20 0711     Scan Slide --     Comment: See Manual Differential Results       Manual Differential [[id number]]  (Abnormal) Collected: 11/07/20 0609    Specimen: Blood Updated: 11/07/20 0711     Neutrophil % 72.0 %      Lymphocyte % 3.0 %      Monocyte % 3.0 %      Bands %  20.0 %      Metamyelocyte % 2.0 %      Neutrophils Absolute 14.55 10*3/mm3      Lymphocytes Absolute 0.47 10*3/mm3      Monocytes Absolute 0.47 10*3/mm3      RBC Morphology Normal     WBC Morphology Normal     Platelet Estimate Adequate    Basic Metabolic Panel [[id number]]  (Abnormal) Collected: 11/07/20 0609    Specimen: Blood Updated: 11/07/20 0659     Glucose 153 mg/dL      BUN 58 mg/dL      Creatinine 2.57 mg/dL      Sodium 138 mmol/L      Potassium 3.8 mmol/L      Chloride 102 mmol/L      CO2 22.3 mmol/L      Calcium 8.6 mg/dL      eGFR Non African Amer 18 mL/min/1.73      BUN/Creatinine Ratio 22.6     Anion Gap 13.7 mmol/L     Narrative:      GFR Normal >60  Chronic Kidney Disease <60  Kidney Failure <15      POC Glucose Once [[id number]]  (Abnormal) Collected: 11/06/20 1959    Specimen: Blood Updated: 11/06/20 2002     Glucose 138 mg/dL      Comment: Serial Number: [id number]:  [id number]       POC Glucose Once [[id number]]  (Abnormal) Collected: 11/06/20 1628    Specimen: Blood Updated: 11/06/20 1641     Glucose 145 mg/dL      Comment: Serial Number: [id number]:  [id number]                 Assessment/Plan       Small bowel obstruction (CMS/HCC)    Spinal stenosis, lumbar region, with neurogenic claudication    Atrial fibrillation (CMS/HCC)    Generalized anxiety disorder    GERD without esophagitis    Patient postoperative day 2 after small bowel resection.  Still with ileus.  Abdominal exam other than for the distention is unremarkable.  I think she would benefit with an NG tube.  She is agreeable.  Certainly if she has no relief and the NG tube is causing her significant discomfort we can remove it later.    Saqib Black,  MD  11/07/20  13:32 EST

## 2020-11-07 NOTE — THERAPY EVALUATION
"Patient Name: Tunde Barrow  : 1931    MRN: [id number]                              Today's Date: 2020       Admit Date: 2020    Visit Dx:     ICD-10-CM ICD-9-CM   1. Small bowel obstruction (CMS/HCC)  K56.609 560.9   2. Renal insufficiency  N28.9 593.9   3. Lactic acidosis  E87.2 276.2   4. Bowel obstruction (CMS/HCC)  K56.609 560.9     Patient Active Problem List   Diagnosis   • Spinal stenosis, lumbar region, with neurogenic claudication   • Small bowel obstruction (CMS/HCC)   • Atrial fibrillation (CMS/HCC)   • Generalized anxiety disorder   • GERD without esophagitis     Past Medical History:   Diagnosis Date   • A-fib (CMS/HCC)    • Anxiety and depression    • Arthritis    • Body piercing     BOTH EARS   • Cataract, bilateral    • Chronic back pain    • Diverticulitis    • GERD (gastroesophageal reflux disease)    • History of nuclear stress test 2017   • [place] (hard of hearing)     SLIGHTLY-NO HEARING AIDS   • Oxygen dependent     AT NIGHT.  UNSURE HOW MUCH SHE IS ON.    • Wears dentures     UPPER PLATE ONLY     Past Surgical History:   Procedure Laterality Date   • APPENDECTOMY     • BREAST SURGERY Right     FIBROID TUMORS X 3    • CARDIAC CATHETERIZATION      STATED \"IT WAS QUITE A WHILE AGO.  I DIDN'T NEED STENTS\".     • CATARACT EXTRACTION W/ INTRAOCULAR LENS IMPLANT Right 2019    Procedure: CATARACT PHACO EXTRACTION WITH INTRAOCULAR LENS IMPLANT RIGHT;  Surgeon: Cindy Olsen MD;  Location: [facility];  Service: Ophthalmology   • CATARACT EXTRACTION W/ INTRAOCULAR LENS IMPLANT Left 2019    Procedure: CATARACT PHACO EXTRACTION WITH INTRAOCULAR LENS IMPLANT LEFT EYE;  Surgeon: Cindy Olsen MD;  Location: [facility];  Service: Ophthalmology   • CHOLECYSTECTOMY     • COLONOSCOPY     • ENDOSCOPY     • EXPLORATORY LAPAROTOMY N/A 2020    Procedure: LAPAROTOMY EXPLORATORY WITH SMALL BOWEL RESECTION;  Surgeon: Umberto Martin MD;  Location: [facility];  Service: General;  Laterality: " N/A;   • HYSTERECTOMY     • LAPAROSCOPIC TUBAL LIGATION     • TONSILLECTOMY       General Information     Row Name 11/07/20 1037          Physical Therapy Time and Intention    Mode of Treatment  physical therapy  -TW     Row Name 11/07/20 1037          General Information    Patient Profile Reviewed  yes  -TW     Prior Level of Function  independent:;all household mobility pt does use a rollator sometimes but primarily holds onto furniture in her home to walk. Pt has shower chair and HHS for bathing. She does this herself. Pt's family cooks for her and assist as needed.  -TW     Existing Precautions/Restrictions  fall;oxygen therapy device and L/min  -TW     Barriers to Rehab  medically complex  -TW     Row Name 11/07/20 1037          Living Environment    Lives With  alone  -TW     Row Name 11/07/20 1037          Home Main Entrance    Number of Stairs, Main Entrance  none ramp  -TW     Row Name 11/07/20 1037          Stairs Within Home, Primary    Number of Stairs, Within Home, Primary  none  -TW     Row Name 11/07/20 1037          Cognition    Orientation Status (Cognition)  oriented x 4  -TW     Row Name 11/07/20 1037          Safety Issues, Functional Mobility    Safety Issues Affecting Function (Mobility)  safety precaution awareness;safety precautions follow-through/compliance  -TW     Impairments Affecting Function (Mobility)  balance;endurance/activity tolerance;strength;postural/trunk control  -TW       User Key  (r) = Recorded By, (t) = Taken By, (c) = Cosigned By    Initials Name Provider Type    TW Manuela Otto, PT Physical Therapist        Mobility     Row Name 11/07/20 1037          Transfers    Comment (Transfers)  Pt needed extended time for sit to stand and to pivot to chair.  -TW     Row Name 11/07/20 1037          Bed-Chair Transfer    Bed-Chair [place] (Transfers)  moderate assist (50% patient effort);verbal cues  -TW     Assistive Device (Bed-Chair Transfers)  walker, front-wheeled  -TW      Row Name 11/07/20 1037          Sit-Stand Transfer    Sit-Stand [place] (Transfers)  minimum assist (75% patient effort);verbal cues  -TW     Assistive Device (Sit-Stand Transfers)  walker, front-wheeled  -TW     Row Name 11/07/20 1037          Gait/Stairs (Locomotion)    [place] Level (Gait)  verbal cues;moderate assist (50% patient effort)  -TW     Assistive Device (Gait)  walker, front-wheeled  -TW     Distance in Feet (Gait)  4 ft  -TW     Deviations/Abnormal Patterns (Gait)  base of support, narrow;stride length decreased;cameron decreased;weight shifting decreased  -TW     Bilateral Gait Deviations  heel strike decreased  -TW     Comment (Gait/Stairs)  Pt needed assist to advance rolling walker. As pt was amb she would dry heave and c/o increased nausea.  -TW     Row Name 11/07/20 1037          Mobility    Extremity Weight-bearing Status  -- Pt received up on BS and bed mobility not observed this am  -TW       User Key  (r) = Recorded By, (t) = Taken By, (c) = Cosigned By    Initials Name Provider Type    TW Manuela Otto, PT Physical Therapist        Obj/Interventions     Row Name 11/07/20 1037          Range of Motion Comprehensive    Comment, General Range of Motion  Grossly WFL's BLE  -TW     Row Name 11/07/20 1037          Strength Comprehensive (MMT)    Comment, General Manual Muscle Testing (MMT) Assessment  BLE grossly 3/5 to 4-/5  -TW     Row Name 11/07/20 1037          Balance    Balance Assessment  sitting static balance;sitting dynamic balance;standing static balance;standing dynamic balance  -TW     Static Sitting Balance  unsupported;mild impairment on BSC  -TW     Dynamic Sitting Balance  mild impairment;sitting in chair Pt kept leaning backwards and could only maintain midline for 10-15 secs  -TW     Static Standing Balance  mild impairment;supported  -TW     Dynamic Standing Balance  moderate impairment  -TW     Balance Interventions  supported  -TW       User Key  (r) = Recorded  By, (t) = Taken By, (c) = Cosigned By    Initials Name Provider Type    TW Clarita, Manuela, PT Physical Therapist        Goals/Plan     Row Name 11/07/20 1037          Bed Mobility Goal 1 (PT)    Activity/Assistive Device (Bed Mobility Goal 1, PT)  bed mobility activities, all  -TW     [place] Level/Cues Needed (Bed Mobility Goal 1, PT)  standby assist  -TW     Time Frame (Bed Mobility Goal 1, PT)  10 days  -TW     Progress/Outcomes (Bed Mobility Goal 1, PT)  goal ongoing  -TW     Row Name 11/07/20 1037          Transfer Goal 1 (PT)    Activity/Assistive Device (Transfer Goal 1, PT)  sit-to-stand/stand-to-sit;bed-to-chair/chair-to-bed;toilet;walker, rolling  -TW     [place] Level/Cues Needed (Transfer Goal 1, PT)  standby assist  -TW     Time Frame (Transfer Goal 1, PT)  10 days  -TW     Progress/Outcome (Transfer Goal 1, PT)  goal ongoing  -TW     Row Name 11/07/20 1037          Gait Training Goal 1 (PT)    Activity/Assistive Device (Gait Training Goal 1, PT)  gait (walking locomotion);assistive device use;decrease fall risk;increase endurance/gait distance;improve balance and speed  -TW     [place] Level (Gait Training Goal 1, PT)  standby assist  -TW     Distance (Gait Training Goal 1, PT)  75 ft  -TW     Time Frame (Gait Training Goal 1, PT)  10 days  -TW     Progress/Outcome (Gait Training Goal 1, PT)  goal ongoing  -TW     Row Name 11/07/20 1037          Patient Education Goal (PT)    Activity (Patient Education Goal, PT)  LE ther ex 1 x 15  -TW     [place]/Cues/Accuracy (Memory Goal 2, PT)  demonstrates adequately;verbalizes understanding  -TW     Time Frame (Patient Education Goal, PT)  10 days  -TW     Progress/Outcome (Patient Education Goal, PT)  goal ongoing  -TW       User Key  (r) = Recorded By, (t) = Taken By, (c) = Cosigned By    Initials Name Provider Type    TW Clarita, Manuela, PT Physical Therapist        Clinical Impression     Row Name 11/07/20 1037          Pain    Additional  Documentation  Pain Scale: FACES Pre/Post-Treatment (Group)  -TW     Row Name 11/07/20 1037          Pain Scale: Numbers Pre/Post-Treatment    Pain Intervention(s)  Repositioned  -TW     Row Name 11/07/20 1037          Pain Scale: FACES Pre/Post-Treatment    Pain: FACES Scale, Pretreatment  2-->hurts little bit  -TW     Posttreatment Pain Rating  2-->hurts little bit  -TW     Pain Location  abdomen  -TW     Pre/Posttreatment Pain Comment  generalized c/o nausea and stomach upset  -TW     Row Name 11/07/20 1037          Plan of Care Review    Plan of Care Reviewed With  patient;daughter  -TW     Outcome Summary  PT evaluation completed this am. Pt was very nauseated and this increased with any functional mobility. Pt presents with deficits in strength, balance, and endurance. She is expected to improve her functional mobility with continued skilled PT services prior to d/c.  -TW     Row Name 11/07/20 1037          Therapy Assessment/Plan (PT)    Patient/Family Therapy Goals Statement (PT)  Pt's daughter states pt will go home with her if pt still requires assist upon d/c. They are not interested in rehab  -TW     Rehab Potential (PT)  good, to achieve stated therapy goals  -TW     Criteria for Skilled Interventions Met (PT)  yes;meets criteria;skilled treatment is necessary  -TW     Predicted Duration of Therapy Intervention (PT)  10 days  -TW     Row Name 11/07/20 1037          Vital Signs    Pretreatment Heart Rate (beats/min)  86  -TW     Intratreatment Heart Rate (beats/min)  101  -TW     Posttreatment Heart Rate (beats/min)  89  -TW     Pre SpO2 (%)  94  -TW     O2 Delivery Pre Treatment  supplemental O2 3 L  -TW     Intra SpO2 (%)  92  -TW     O2 Delivery Intra Treatment  supplemental O2 3 L  -TW     Post SpO2 (%)  94  -TW     O2 Delivery Post Treatment  supplemental O2 3 L  -TW     Pre Patient Position  Sitting  -TW     Intra Patient Position  Standing  -TW     Post Patient Position  Sitting  -TW     Row Name  11/07/20 1037          Positioning and Restraints    Pre-Treatment Position  bedside commode  -TW     Post Treatment Position  chair  -TW     In Chair  reclined;call light within reach;encouraged to call for assist;exit alarm on;with family/caregiver;legs elevated  -TW       User Key  (r) = Recorded By, (t) = Taken By, (c) = Cosigned By    Initials Name Provider Type    Manuela Hayes PT Physical Therapist        Outcome Measures     Row Name 11/07/20 1037          How much help from another person do you currently need...    Turning from your back to your side while in flat bed without using bedrails?  3  -TW     Moving from lying on back to sitting on the side of a flat bed without bedrails?  3  -TW     Moving to and from a bed to a chair (including a wheelchair)?  2  -TW     Standing up from a chair using your arms (e.g., wheelchair, bedside chair)?  3  -TW     Climbing 3-5 steps with a railing?  1  -TW     To walk in hospital room?  2  -TW     AM-PAC 6 Clicks Score (PT)  14  -TW       User Key  (r) = Recorded By, (t) = Taken By, (c) = Cosigned By    Initials Name Provider Type    Manuela Hayes PT Physical Therapist        Physical Therapy Education                 Title: PT OT SLP Therapies (In Progress)     Topic: Physical Therapy (In Progress)     Point: Mobility training (Done)     Learning Progress Summary           Patient Acceptance, E,D, MADALYN,ROGELIO by  at 11/7/2020 1037    Comment: Pt education for use of rolling walker for safe sit to stand and gait technique.   Family Acceptance, E,MADALYN BERNABE VU by  at 11/7/2020 1037    Comment: Pt education for use of rolling walker for safe sit to stand and gait technique.                   Point: Home exercise program (Not Started)     Learner Progress:  Not documented in this visit.          Point: Body mechanics (Not Started)     Learner Progress:  Not documented in this visit.                      User Key     Initials Effective Dates Name Provider Type  Discipline    TW 03/26/19 -  Manuela Otto PT Physical Therapist PT              PT Recommendation and Plan  Planned Therapy Interventions (PT): balance training, bed mobility training, gait training, home exercise program, patient/family education, strengthening, transfer training  Plan of Care Reviewed With: patient, daughter  Outcome Summary: PT evaluation completed this am. Pt was very nauseated and this increased with any functional mobility. Pt presents with deficits in strength, balance, and endurance. She is expected to improve her functional mobility with continued skilled PT services prior to d/c.     Time Calculation:   PT Charges     Row Name 11/07/20 1037             Time Calculation    Stop Time  1037  -TW      PT Received On  11/07/20 -TW      PT Goal Re-Cert Due Date  11/17/20 -TW        User Key  (r) = Recorded By, (t) = Taken By, (c) = Cosigned By    Initials Name Provider Type    TW Manuela Otto PT Physical Therapist        Therapy Charges for Today     Code Description Service Date Service Provider Modifiers Qty    39396436657 HC PT EVAL LOW COMPLEXITY 3 11/7/2020 Manuela Otto PT GP 1          PT G-Codes  AM-PAC 6 Clicks Score (PT): 14    Manuela Otto PT  11/7/2020

## 2020-11-08 NOTE — PROGRESS NOTES
[facility]    Name:  Tunde Barrow   Age:  89 y.o.  Sex:  female  :  1931  MRN:  [id number]   Visit Number:  [id number]  Admission Date:  2020  Date Of Service:  20  Primary Care Physician:  Lorna Hagen MD     LOS: 4 days :  Patient Care Team:  Lorna Hagen MD as PCP - General (Internal Medicine)  Lorna Hagen MD as Referring Physician (Internal Medicine):    Chief Complaint:      Abdominal pain    Subjective / Interval History:     Patient sitting resting comfortably.  No longer complaining of nausea.  Brother is at bedside.  Patient is stating that she is hungry and requesting to eat.  Had to explain again why she needed to stay n.p.o.  No acute events per nursing staff overnight.    Review of Systems:     General ROS: Patient denies any fevers, chills or loss of consciousness.  Respiratory ROS: Denies cough or shortness of breath.  Cardiovascular ROS: Denies chest pain or palpitations. No history of exertional chest pain.  Gastrointestinal ROS: Complains of nausea, no vomiting.  Some abdominal pain and bloating.  Neurological ROS: Denies any focal weakness. No loss of consciousness. Denies any numbness.  Dermatological ROS: Denies any redness or pruritis.    Vital Signs:    Temp:  [97.4 °F (36.3 °C)-98.2 °F (36.8 °C)] 97.4 °F (36.3 °C)  Heart Rate:  [76-97] 76  Resp:  [18-20] 20  BP: (129-190)/() 154/100    Intake and output:    I/O last 3 completed shifts:  In: 2559 [I.V.:2559]  Out: 550 [Urine:550]  I/O this shift:  In: -   Out: 100 [Urine:100]    Physical Examination:    General Appearance:  Alert and cooperative, not in any acute distress.   Head:  Atraumatic and normocephalic, without obvious abnormality.   Eyes:          PERRLA, conjunctivae and sclerae normal, no Icterus. No pallor. Extraocular movements are within normal limits.   Neck: Supple, trachea midline, no thyromegaly, no carotid bruit.    Lungs:   Chest shape is normal. Breath sounds heard bilaterally equally.  No crackles or wheezing.    Heart:  Normal S1 and S2, no murmur, No JVD   Abdomen:   Hypoactive bowel sounds, worsening distention.  No guarding.   Extremities: no edema, no cyanosis, no clubbing.   Skin: No bleeding, bruising or rash.   Neurologic: Awake, alert and oriented times 3. Moves all 4 extremities equally.     Laboratory results:    Results from last 7 days   Lab Units 11/08/20  0554 11/07/20  0609 11/06/20  0609  11/04/20  2020   SODIUM mmol/L 136 138 139   < > 137   POTASSIUM mmol/L 3.7 3.8 3.8   < > 3.9   CHLORIDE mmol/L 103 102 101   < > 96*   CO2 mmol/L 21.8* 22.3 19.2*   < > 26.3   BUN mg/dL 50* 58* 46*   < > 37*   CREATININE mg/dL 1.55* 2.57* 2.03*   < > 1.34*   CALCIUM mg/dL 9.1 8.6 8.4*   < > 10.5   BILIRUBIN mg/dL  --   --   --   --  0.6   ALK PHOS U/L  --   --   --   --  138*   ALT (SGPT) U/L  --   --   --   --  13   AST (SGOT) U/L  --   --   --   --  23   GLUCOSE mg/dL 174* 153* 275*   < > 166*    < > = values in this interval not displayed.     Results from last 7 days   Lab Units 11/08/20  0554 11/07/20  0609 11/06/20  0609   WBC 10*3/mm3 18.02* 15.82* 10.26   HEMOGLOBIN g/dL 12.1 11.2* 9.6*   HEMATOCRIT % 36.9 33.5* 30.1*   PLATELETS 10*3/mm3 177 150 213         Results from last 7 days   Lab Units 11/05/20  0034   TROPONIN T ng/mL <0.010               I have reviewed the patient's laboratory results.    Radiology results:    Imaging Results (Last 24 Hours)     Procedure Component Value Units Date/Time    XR Abdomen KUB [[id number]] Collected: 11/08/20 1100     Updated: 11/08/20 1132    Narrative:      PROCEDURE: XR ABDOMEN KUB-     INDICATION:  Ileus; K56.609-Unspecified intestinal obstruction,  unspecified as to partial versus complete obstruction; N28.9-Disorder of  kidney and ureter, unspecified; E87.2-Acidosis; K56.609-Unspecified  intestinal obstruction, unspecified as to partial versus complete  obstruction      COMPARISON:  1 day prior.     FINDINGS:  A supine view of the abdomen was obtained.  Mild to  moderately dilated small bowel loops again noted from the left upper  quadrant to the pelvis, similar to prior. More air is identified in the  cecum and the rectum. Appearance may represent partial small bowel  obstruction or perhaps ileus. Continued follow-up recommended. Total  right hip arthroplasty noted..  There are no pathologic calcifications.   No acute osseous abnormality is identified.       Impression:      No significant change in small bowel distention but  increased air in the cecum and rectum as described.     This report was finalized on 11/8/2020 11:30 AM by Dana Valenica MD.    XR Abdomen 1 View [[id number]] Collected: 11/07/20 1610     Updated: 11/07/20 1622    Narrative:      PROCEDURE: XR ABDOMEN 1 VW-     INDICATION:  ileus; K56.609-Unspecified intestinal obstruction,  unspecified as to partial versus complete obstruction; N28.9-Disorder of  kidney and ureter, unspecified; E87.2-Acidosis; K56.609-Unspecified  intestinal obstruction, unspecified as to partial versus complete  obstruction     COMPARISON:  CT 11/05/2020.     FINDINGS:  A supine view of the abdomen was obtained.  There are  moderately dilated bowel loops measuring up to 48 mm; this is mildly  increased from prior exam when they measured up to 41 mm. Small amount  of air appears to be located in the cecum. Minimal air noted in the  rectum. Findings suggest at least a high-grade partial small bowel  obstruction, mildly worsened from prior. Midline skin staples noted.  Total right hip arthroplasty present. Osteopenia identified..  There are  no pathologic calcifications.  No acute osseous abnormality is  identified.       Impression:      Mild interval increase in small bowel distention compared  to 11/05/2020.        This report was finalized on 11/7/2020 4:20 PM by Dana Valencia MD.          I have reviewed the patient's radiology  reports.    Medication Review:     I have reviewed the patients active and prn medications.       Small bowel obstruction (CMS/HCC)    Spinal stenosis, lumbar region, with neurogenic claudication    Atrial fibrillation (CMS/HCC)    Generalized anxiety disorder    GERD without esophagitis      Assessment:    Small bowel obstruction POA-s/p ex lap with small bowel resection 11/5/2020  Acute kidney injury  Lactic acidosis   Asymptomatic bacteriuria  atrial fibrillation requiring long-term anticoagulation with Eliquis  Acute anemia from postop blood loss  Chronic benzodiazepine and opiate usage  Chronic: GERD, anxiety/depression, arthritis, back pain       Plan:    Monitor patient in the hospital.  She underwent exploratory laparotomy with small bowel resection due to volvulus around an adhesion performed by Dr. Martin 11/5/2020.  Continue with Zosyn.  Urinary output is appropriate after discontinuation of Olivarez catheter.  Encouraged ambulation and work with PT.  Monitor renal function with routine morning labs.  Patient daily n.p.o. awaiting bowel function returned.  Continue with decreased rate of D5 and add on sliding scale insulin for hyperglycemia.  Blood glucose better controlled today.  Patient received 2 units PRBC transfusion 11/6/2020 for postop expected blood loss.  Hemoglobin hematocrit remained stable.  Have added on hydralazine as needed for blood pressure control.  Will attempt to place NG tube again today.  Further orders as clinical course dictates.    Nigel Falcon,   11/08/20  12:13 EST    Dictated utilizing Dragon dictation.

## 2020-11-08 NOTE — NURSING NOTE
"Upon this mornings assessment patient states \"My back is hurting\" denies abdominal pain at this time. Abdomen is firm distended and no audible bowel sound at that time. Spoke with Dr. Black about this mornings assessment and nightshifts report about unsuccessful attempts to place NG tube.  MD stated he heard bowel sounds and to hold off at placing NG tube at this time. No new orders at this time.   "

## 2020-11-08 NOTE — THERAPY TREATMENT NOTE
"Patient Name: Tunde Barrow  : 1931    MRN: [id number]                              Today's Date: 2020       Admit Date: 2020    Visit Dx:     ICD-10-CM ICD-9-CM   1. Small bowel obstruction (CMS/HCC)  K56.609 560.9   2. Renal insufficiency  N28.9 593.9   3. Lactic acidosis  E87.2 276.2   4. Bowel obstruction (CMS/HCC)  K56.609 560.9     Patient Active Problem List   Diagnosis   • Spinal stenosis, lumbar region, with neurogenic claudication   • Small bowel obstruction (CMS/HCC)   • Atrial fibrillation (CMS/HCC)   • Generalized anxiety disorder   • GERD without esophagitis     Past Medical History:   Diagnosis Date   • A-fib (CMS/HCC)    • Anxiety and depression    • Arthritis    • Body piercing     BOTH EARS   • Cataract, bilateral    • Chronic back pain    • Diverticulitis    • GERD (gastroesophageal reflux disease)    • History of nuclear stress test 2017   • [place] (hard of hearing)     SLIGHTLY-NO HEARING AIDS   • Oxygen dependent     AT NIGHT.  UNSURE HOW MUCH SHE IS ON.    • Wears dentures     UPPER PLATE ONLY     Past Surgical History:   Procedure Laterality Date   • APPENDECTOMY     • BREAST SURGERY Right     FIBROID TUMORS X 3    • CARDIAC CATHETERIZATION      STATED \"IT WAS QUITE A WHILE AGO.  I DIDN'T NEED STENTS\".     • CATARACT EXTRACTION W/ INTRAOCULAR LENS IMPLANT Right 2019    Procedure: CATARACT PHACO EXTRACTION WITH INTRAOCULAR LENS IMPLANT RIGHT;  Surgeon: Cindy Olsen MD;  Location: [facility];  Service: Ophthalmology   • CATARACT EXTRACTION W/ INTRAOCULAR LENS IMPLANT Left 2019    Procedure: CATARACT PHACO EXTRACTION WITH INTRAOCULAR LENS IMPLANT LEFT EYE;  Surgeon: Cindy Olsen MD;  Location: [facility];  Service: Ophthalmology   • CHOLECYSTECTOMY     • COLONOSCOPY     • ENDOSCOPY     • EXPLORATORY LAPAROTOMY N/A 2020    Procedure: LAPAROTOMY EXPLORATORY WITH SMALL BOWEL RESECTION;  Surgeon: Umberto Martin MD;  Location: [facility];  Service: General;  Laterality: " N/A;   • HYSTERECTOMY     • LAPAROSCOPIC TUBAL LIGATION     • TONSILLECTOMY       General Information     Row Name 11/08/20 1155          Physical Therapy Time and Intention    Mode of Treatment  physical therapy  -     Row Name 11/08/20 1155          General Information    Patient Profile Reviewed  yes  -     Existing Precautions/Restrictions  fall;oxygen therapy device and L/min  -     Row Name 11/08/20 1155          Safety Issues, Functional Mobility    Safety Issues Affecting Function (Mobility)  safety precaution awareness;safety precautions follow-through/compliance  -     Impairments Affecting Function (Mobility)  balance;endurance/activity tolerance;strength;postural/trunk control  -       User Key  (r) = Recorded By, (t) = Taken By, (c) = Cosigned By    Initials Name Provider Type     Ping Mosquera, PABLO Physical Therapy Assistant        Mobility     Row Name 11/08/20 1155          Bed Mobility    Bed Mobility  supine-sit  -     Supine-Sit Garrett (Bed Mobility)  moderate assist (50% patient effort)  -     Assistive Device (Bed Mobility)  head of bed elevated;bed rails  -     Row Name 11/08/20 1155          Bed-Chair Transfer    Bed-Chair Garrett (Transfers)  moderate assist (50% patient effort)  -     Row Name 11/08/20 1155          Sit-Stand Transfer    Sit-Stand Garrett (Transfers)  minimum assist (75% patient effort);verbal cues  -     Assistive Device (Sit-Stand Transfers)  walker, front-wheeled  -     Row Name 11/08/20 1155          Gait/Stairs (Locomotion)    Garrett Level (Gait)  verbal cues;moderate assist (50% patient effort);minimum assist (75% patient effort)  -     Assistive Device (Gait)  walker, front-wheeled  -     Distance in Feet (Gait)  5x2  -     Deviations/Abnormal Patterns (Gait)  base of support, narrow;stride length decreased;cameron decreased;weight shifting decreased  -     Bilateral Gait Deviations  heel strike decreased  -      Comment (Gait/Stairs)  Pt amb from bed -> BSC and BSC->recliner chair  -CC     Row Name 11/08/20 1155          Mobility    Extremity Weight-bearing Status  -- Pt received up on BSC and bed mobility not observed this am  -       User Key  (r) = Recorded By, (t) = Taken By, (c) = Cosigned By    Initials Name Provider Type    CC Ping Mosquera PTA Physical Therapy Assistant        Obj/Interventions    No documentation.       Goals/Plan    No documentation.       Clinical Impression     Row Name 11/08/20 1155          Pain    Additional Documentation  Pain Scale: FACES Pre/Post-Treatment (Group)  -     Row Name 11/08/20 1155          Pain Scale: FACES Pre/Post-Treatment    Pain: FACES Scale, Pretreatment  0-->no hurt  -     Posttreatment Pain Rating  0-->no hurt  -     Row Name 11/08/20 1155          Plan of Care Review    Plan of Care Reviewed With  patient;grandchild(monroe)  -     Progress  improving  -     Outcome Summary  Pt with decreased nauseous feeling with pt having 2 short episodes of dry heaving which lasted approx 30 secs after movement. Pt with decreased A for transfers and amb. Con't with PT POC and progress as tolerated  -     Row Name 11/08/20 1158          Therapy Assessment/Plan (PT)    Rehab Potential (PT)  good, to achieve stated therapy goals  -     Criteria for Skilled Interventions Met (PT)  yes;meets criteria;skilled treatment is necessary  -     Row Name 11/08/20 1158          Positioning and Restraints    Pre-Treatment Position  in bed  -CC     Post Treatment Position  chair  -CC     In Chair  reclined;call light within reach;encouraged to call for assist;exit alarm on;with family/caregiver  -       User Key  (r) = Recorded By, (t) = Taken By, (c) = Cosigned By    Initials Name Provider Type    CC Ping Mosquera PTA Physical Therapy Assistant        Outcome Measures     Row Name 11/08/20 1155          How much help from another person do you currently need...     Turning from your back to your side while in flat bed without using bedrails?  3  -CC     Moving from lying on back to sitting on the side of a flat bed without bedrails?  3  -CC     Moving to and from a bed to a chair (including a wheelchair)?  2  -CC     Standing up from a chair using your arms (e.g., wheelchair, bedside chair)?  3  -CC     Climbing 3-5 steps with a railing?  1  -CC     To walk in hospital room?  3  -CC     AM-PAC 6 Clicks Score (PT)  15  -CC     Row Name 11/08/20 1155          Functional Assessment    Outcome Measure Options  AM-PAC 6 Clicks Basic Mobility (PT)  -       User Key  (r) = Recorded By, (t) = Taken By, (c) = Cosigned By    Initials Name Provider Type     Ping Mosquera PTA Physical Therapy Assistant        Physical Therapy Education                 Title: PT OT SLP Therapies (In Progress)     Topic: Physical Therapy (In Progress)     Point: Mobility training (Done)     Learning Progress Summary           Patient Acceptance, E,TB, VU by  at 11/8/2020 1324    Comment: importance of increasing time OOB daily    Acceptance, E,D, DU,VU by  at 11/7/2020 1037    Comment: Pt education for use of rolling walker for safe sit to stand and gait technique.   Family Acceptance, E,D, DU,VU by  at 11/7/2020 1037    Comment: Pt education for use of rolling walker for safe sit to stand and gait technique.                   Point: Home exercise program (Not Started)     Learner Progress:  Not documented in this visit.          Point: Body mechanics (Not Started)     Learner Progress:  Not documented in this visit.                      User Key     Initials Effective Dates Name Provider Type Discipline     03/07/18 -  Ping Mosquera PTA Physical Therapy Assistant PT     03/26/19 -  Manuela Otto, PT Physical Therapist PT              PT Recommendation and Plan     Plan of Care Reviewed With: patient, grandchild(monroe)  Progress: improving  Outcome Summary: Pt with decreased nauseous  feeling with pt having 2 short episodes of dry heaving which lasted approx 30 secs after movement. Pt with decreased A for transfers and amb. Con't with PT POC and progress as tolerated     Time Calculation:   PT Charges     Row Name 11/08/20 1155             Time Calculation    Start Time  1155  -CC      Stop Time  1225  -CC      Time Calculation (min)  30 min  -CC      PT Received On  11/08/20  -CC      PT Goal Re-Cert Due Date  11/17/20  -CC         Timed Charges    19494 - Gait Training Minutes   15  -CC      69045 - PT Therapeutic Activity Minutes  15  -CC        User Key  (r) = Recorded By, (t) = Taken By, (c) = Cosigned By    Initials Name Provider Type    CC Ping Mosquera PTA Physical Therapy Assistant        Therapy Charges for Today     Code Description Service Date Service Provider Modifiers Qty    03660533392 HC GAIT TRAINING EA 15 MIN 11/8/2020 Ping Mosquera PTA GP 1    38201699662 HC PT THERAPEUTIC ACT EA 15 MIN 11/8/2020 Ping Mosquera PTA GP 1          PT G-Codes  Outcome Measure Options: AM-PAC 6 Clicks Basic Mobility (PT)  AM-PAC 6 Clicks Score (PT): 15    Ping Mosquera PTA  11/8/2020

## 2020-11-08 NOTE — PLAN OF CARE
Goal Outcome Evaluation:  Plan of Care Reviewed With: patient  Progress: no change     Blood pressure elevated through the night, monitoring at this time per MD orders. Patient continues to dry heave, occasionally with small amounts of emesis- anti-emetics given with some effectiveness. Patient abdomen distended. Patient refused on multiple occasions through the shift for attempted NG tube placement. No complaints of pain noted. Will continue to monitor.

## 2020-11-08 NOTE — PLAN OF CARE
Problem: Adult Inpatient Plan of Care  Goal: Plan of Care Review  Recent Flowsheet Documentation  Taken 11/8/2020 1155 by Ping Mosquera, PTA  Progress: improving  Plan of Care Reviewed With:   patient   grandchild(monroe)  Outcome Summary: Pt with decreased nauseous feeling with pt having 2 short episodes of dry heaving which lasted approx 30 secs after movement. Pt with decreased A for transfers and amb. Con't with PT POC and progress as tolerated

## 2020-11-08 NOTE — PLAN OF CARE
Problem: Adult Inpatient Plan of Care  Goal: Plan of Care Review  Outcome: Ongoing, Progressing  Flowsheets (Taken 11/8/2020 1731)  Plan of Care Reviewed With: patient  Outcome Summary: Pt is NPO with decreased nausea. VSS. Pain controlled with medication per MAR.  Goal: Patient-Specific Goal (Individualized)  Outcome: Ongoing, Progressing  Goal: Absence of Hospital-Acquired Illness or Injury  Outcome: Ongoing, Progressing  Intervention: Identify and Manage Fall Risk  Recent Flowsheet Documentation  Taken 11/8/2020 1600 by Daniela Horvath RN  Safety Promotion/Fall Prevention: safety round/check completed  Taken 11/8/2020 1400 by Daniela Horvath RN  Safety Promotion/Fall Prevention: safety round/check completed  Taken 11/8/2020 1200 by Daniela Horvath RN  Safety Promotion/Fall Prevention: safety round/check completed  Taken 11/8/2020 1000 by Daniela Horvath RN  Safety Promotion/Fall Prevention: safety round/check completed  Taken 11/8/2020 0800 by Daniela Horvath RN  Safety Promotion/Fall Prevention: safety round/check completed  Intervention: Prevent Skin Injury  Recent Flowsheet Documentation  Taken 11/8/2020 1600 by Daniela Horvath RN  Body Position: side-lying, left  Taken 11/8/2020 1400 by Daniela Horvath RN  Body Position: position changed independently  Taken 11/8/2020 1200 by Daniela Horvath RN  Body Position: position changed independently  Taken 11/8/2020 1000 by Daniela Horvath RN  Body Position: position changed independently  Taken 11/8/2020 0800 by Daniela Horvath RN  Body Position: position changed independently  Goal: Optimal Comfort and Wellbeing  Outcome: Ongoing, Progressing  Intervention: Provide Person-Centered Care  Recent Flowsheet Documentation  Taken 11/8/2020 0800 by Daniela Horvath RN  Trust Relationship/Rapport: care explained  Goal: Readiness for Transition of Care  Outcome: Ongoing, Progressing     Problem: Fall Injury Risk  Goal: Absence of Fall and Fall-Related Injury  Outcome: Ongoing,  Progressing  Intervention: Identify and Manage Contributors to Fall Injury Risk  Recent Flowsheet Documentation  Taken 11/8/2020 1600 by Daniela Horvath RN  Medication Review/Management: medications reviewed  Taken 11/8/2020 1200 by Daniela Horvath RN  Medication Review/Management: medications reviewed  Taken 11/8/2020 0800 by Daniela Horvath RN  Medication Review/Management: medications reviewed  Intervention: Promote Injury-Free Environment  Recent Flowsheet Documentation  Taken 11/8/2020 1600 by Daniela Horvath RN  Safety Promotion/Fall Prevention: safety round/check completed  Taken 11/8/2020 1400 by Daniela Horvath RN  Safety Promotion/Fall Prevention: safety round/check completed  Taken 11/8/2020 1200 by Daniela Horvath RN  Safety Promotion/Fall Prevention: safety round/check completed  Taken 11/8/2020 1000 by Daniela Horvath RN  Safety Promotion/Fall Prevention: safety round/check completed  Taken 11/8/2020 0800 by Daniela Horvath RN  Safety Promotion/Fall Prevention: safety round/check completed     Problem: Skin Injury Risk Increased  Goal: Skin Health and Integrity  Outcome: Ongoing, Progressing  Intervention: Optimize Skin Protection  Recent Flowsheet Documentation  Taken 11/8/2020 1600 by Daniela Horvath RN  Head of Bed (HOB): HOB at 20-30 degrees  Taken 11/8/2020 1400 by Daniela Horvath RN  Head of Bed (HOB): HOB at 30-45 degrees  Taken 11/8/2020 1200 by Daniela Horvath RN  Head of Bed (HOB): HOB at 30-45 degrees  Taken 11/8/2020 1000 by Daniela Horvath RN  Head of Bed (HOB): HOB at 20-30 degrees  Taken 11/8/2020 0800 by Daniela Horvath RN  Head of Bed (HOB): HOB at 20-30 degrees     Problem: Pain Chronic (Persistent) (Comorbidity Management)  Goal: Acceptable Pain Control and Functional Ability  Outcome: Ongoing, Progressing  Intervention: Develop Pain Management Plan  Recent Flowsheet Documentation  Taken 11/8/2020 1200 by Daniela Horvath RN  Pain Management Interventions:   pillow support provided   position adjusted  Taken  11/8/2020 0800 by Daniela Horvath RN  Pain Management Interventions:   pillow support provided   position adjusted  Intervention: Manage Persistent Pain  Recent Flowsheet Documentation  Taken 11/8/2020 1600 by Daniela Horvath RN  Medication Review/Management: medications reviewed  Taken 11/8/2020 1200 by Daniela Horvath RN  Medication Review/Management: medications reviewed  Taken 11/8/2020 0800 by Daniela Horvath RN  Medication Review/Management: medications reviewed  Intervention: Optimize Psychosocial Wellbeing  Recent Flowsheet Documentation  Taken 11/8/2020 0800 by Daniela Horvath RN  Supportive Measures: active listening utilized  Diversional Activities: television  Family/Support System Care: support provided   Goal Outcome Evaluation:  Plan of Care Reviewed With: patient  Progress: improving  Outcome Summary: Pt is NPO with decreased nausea. VSS. Pain controlled with medication per MAR.

## 2020-11-09 NOTE — PLAN OF CARE
Problem: Adult Inpatient Plan of Care  Goal: Plan of Care Review  Outcome: Ongoing, Progressing  Flowsheets (Taken 11/9/2020 0039)  Outcome Summary: RESTED WELL.MILD NAUSEA.REMAINS NPO.   Goal Outcome Evaluation:  Plan of Care Reviewed With: patient  Progress: improving  Outcome Summary: RESTED WELL.MILD NAUSEA.REMAINS NPO.   "  FORM C-4:  EMPLOYEE’S CLAIM FOR COMPENSATION/ REPORT OF INITIAL TREATMENT  EMPLOYEE’S CLAIM - PROVIDE ALL INFORMATION REQUESTED   First Name  Belgica Last Name  Arsenio Birthdate             Age  1990 26 y.o. Sex  female Claim Number   Home Employee Address  [address]                                     Zip  [address] Height  1.626 m (5' 4\") Weight  60.782 kg (134 lb) [facility]  [id number]   Mailing Employee Address                           [address]               Zip  [address] Telephone  [phone] (home)  Primary Language Spoken  ENGLISH   Insurer  *** Third Party   WORKERS CHOICE Employee's Occupation (Job Title) When Injury or Occupational Disease Occurred  [email or web address]   Employer's Name  [employer] Telephone  [phone]    Employer Address  [address] Zip  [address]   Date of Injury  6/6/2017       Hour of Injury  8:15 PM Date Employer Notified  6/6/2017 Last Day of Work after Injury or Occupational Disease  6/6/2017 Supervisor to Whom Injury Reported  Kasey Yarbrough   Address or Location of Accident (if applicable)  [[facility]]   What were you doing at the time of accident? (if applicable)  Cleaning Instrumentation    How did this injury or occupational disease occur? Be specific and answer in detail. Use additional sheet if necessary)  I was reaching for the scissors in the dirty tray and upon doing so was poked by the tip of an Iris scissor.   If you believe that you have an occupational disease, when did you first have knowledge of the disability and it relationship to your employment?  N/A Witnesses to the Accident  Marc Davis     Nature of Injury or Occupational Disease  Workers' Compensation  Part(s) of Body Injured or Affected  Finger (R), N/A, N/A    I certify that the above is true and correct to the best of my knowledge and that I have " provided this information in order to obtain the benefits of [place]  I hereby authorize any physician, chiropractor, surgeon, practitioner, or other person, any hospital, including [facility] hospital, any medical service organization, any insurance company, or other institution or organization to release to each other, any medical or other information, including benefits paid or payable, pertinent to this injury or disease, except information relative to diagnosis, treatment and/or counseling for AIDS, psychological conditions, alcohol or controlled substances, for which I must give specific authorization.  A Photostat of this authorization shall be as valid as the original.   Date Place   Employee’s Signature   THIS REPORT MUST BE COMPLETED AND MAILED WITHIN 3 WORKING DAYS OF TREATMENT   Place  [facility], EMERGENCY DEPT  Name of Facility   [facility]   Date  6/6/2017 Diagnosis  (Z57.8) Employee exposure to body fluids Is there evidence the injured employee was under the influence of alcohol and/or another controlled substance at the time of accident?   Hour  11:47 PM Description of Injury or Disease  Employee exposure to body fluids     Treatment     Have you advised the patient to remain off work five days or more?             X-Ray Findings      If Yes   From Date    To Date      From information given by the employee, together with medical evidence, can you directly connect this injury or occupational disease as job incurred?    If No, is the employee capable of: Full Duty    Modified Duty      Is additional medical care by a physician indicated?    If Modified Duty, Specify any Limitations / Restrictions        Do you know of any previous injury or disease contributing to this condition or occupational disease?      Date  6/6/2017 Print  "Doctor’s Name  Annie Mistry I certify the employer’s copy of this form was mailed on:   Address  [address] [address] [phone] Insurer’s Use Only   [facility]  [address]    Provider’s Tax ID Number    Telephone  Dept: [phone]    Doctor’s Signature    Degree       Original - TREATING PHYSICIAN OR CHIROPRACTOR   Pg 2-Insurer/TPA   Pg 3-Employer   Pg 4-Employee                                                                                                  Form C-4 (rev01/03)     BRIEF DESCRIPTION OF RIGHTS AND BENEFITS  (Pursuant to [place] 616[place])    Notice of Injury or Occupational Disease (Incident Report Form C-1): If an injury or occupational disease (OD) arises out of and in the course of employment, you must provide written notice to your employer as soon as practicable, but no later than 7 days after the accident or OD. Your employer shall maintain a sufficient supply of the required forms.    Claim for Compensation (Form C-4): If medical treatment is sought, the form C-4 is available at the place of initial treatment. A completed \"Claim for Compensation\" (Form C-4) must be filed within 90 days after an accident or OD. The treating physician or chiropractor must, within 3 working days after treatment, complete and mail to the employer, the employer's insurer and third-party , the Claim for Compensation.    Medical Treatment: If you require medical treatment for your on-the-job injury or OD, you may be required to select a physician or chiropractor from a list provided by your workers’ compensation insurer, if it has contracted with an Organization for Managed Care (MCO) or Preferred Provider Organization (PPO) or providers of health care. If your employer has not entered into a contract with an MCO or PPO, you may select a physician or chiropractor from the Panel of Physicians and Chiropractors. Any medical costs related to your industrial injury or OD " will be paid by your insurer.    Temporary Total Disability (TTD): If your doctor has certified that you are unable to work for a period of at least 5 consecutive days, or 5 cumulative days in a 20-day period, or places restrictions on you that your employer does not accommodate, you may be entitled to TTD compensation.    Temporary Partial Disability (TPD): If the wage you receive upon reemployment is less than the compensation for TTD to which you are entitled, the insurer may be required to pay you TPD compensation to make up the difference. TPD can only be paid for a maximum of 24 months.    Permanent Partial Disability (PPD): When your medical condition is stable and there is an indication of a PPD as a result of your injury or OD, within 30 days, your insurer must arrange for an evaluation by a rating physician or chiropractor to determine the degree of your PPD. The amount of your PPD award depends on the date of injury, the results of the PPD evaluation and your age and wage.    Permanent Total Disability (PTD): If you are medically certified by a treating physician or chiropractor as permanently and totally disabled and have been granted a PTD status by your insurer, you are entitled to receive monthly benefits not to exceed 66 2/3% of your average monthly wage. The amount of your PTD payments is subject to reduction if you previously received a PPD award.    Vocational Rehabilitation Services: You may be eligible for vocational rehabilitation services if you are unable to return to the job due to a permanent physical impairment or permanent restrictions as a result of your injury or occupational disease.    Transportation and Per Srinivasan Reimbursement: You may be eligible for travel expenses and per srinivasan associated with medical treatment.  Reopening: You may be able to reopen your claim if your condition worsens after claim closure.    Appeal Process: If you disagree with a written determination issued by the  insurer or the insurer does not respond to your request, you may appeal to the Department of Administration, , by following the instructions contained in your determination letter. You must appeal the determination within 70 days from the date of the determination letter at [address], [address], [place], [place] [address], or [address], [address], [place], [place] [address]. If you disagree with the  decision, you may appeal to the Department of Administration, . You must file your appeal within 30 days from the date of the  decision letter at [address], [address], [place], [place] [address], or [address], [place], [place] [address]. If you disagree with a decision of an , you may file a petition for judicial review with the District Court. You must do so within 30 days of the Appeal Officer’s decision. You may be represented by an  at your own expense or you may contact the [facility] for possible representation.    [place]  for Injured Workers (NAIW): If you disagree with a  decision, you may request that NAIW represent you without charge at an  Hearing. For information regarding denial of benefits, you may contact the [facility] at: [address], [address], [place] [address], [phone], or [address], [address], [place] [address], [phone]    To File a Complaint with the Division: If you wish to file a complaint with the  of the Division of Industrial Relations (DIR), please contact the Workers’ Compensation Section, [address], [place], [place] [address], telephone [phone], or [address], [place] [address], telephone [phone].    For assistance with Workers’ Compensation Issues: you may contact the Office of the Governor Consumer Health  Assistance, [address], [address], [place], [place] [address], Toll Free [phone], Web site: [email or web address]., E-mail [email or web address].                                                                                                                                                                               __________________________________________________________________                                    _________________            Employee Name / Signature                                                                                                                            Date                                       D-2 (rev. 10/07)

## 2020-11-09 NOTE — THERAPY TREATMENT NOTE
Therapy treatment held this date d/t pt c/o chest pain and pt to receive EKG. Will f/u with pt at a later time.

## 2020-11-09 NOTE — TELEPHONE ENCOUNTER
Pt's daughter called the office, I returned her call and I left a message on her voice mail asking her to return my call tomorrow.  I have encouraged her to talk with the nurses at the hospital that are caring for her mother if she needed immediate response.

## 2020-11-09 NOTE — PROGRESS NOTES
[facility]    Name:  Tunde Barrow   Age:  89 y.o.  Sex:  female  :  1931  MRN:  [id number]   Visit Number:  [id number]  Admission Date:  2020  Date Of Service:  20  Primary Care Physician:  Lorna Hagen MD     LOS: 5 days :  Patient Care Team:  Lorna Hagen MD as PCP - General (Internal Medicine)  Lorna Hagen MD as Referring Physician (Internal Medicine):    Chief Complaint:      Abdominal pain    Subjective / Interval History:     Patient sitting resting comfortably.  Complaining of abdominal bloating and belching.  Still no passing gas or bowel movements.  No family present at bedside at time of my exam.  No acute events overnight per nursing staff.    Review of Systems: Reviewed again today    General ROS: Patient denies any fevers, chills or loss of consciousness.  Respiratory ROS: Denies cough or shortness of breath.  Cardiovascular ROS: Denies chest pain or palpitations. No history of exertional chest pain.  Gastrointestinal ROS: Complains of nausea, no vomiting.  Some abdominal pain and bloating.  Neurological ROS: Denies any focal weakness. No loss of consciousness. Denies any numbness.  Dermatological ROS: Denies any redness or pruritis.    Vital Signs:    Temp:  [97.5 °F (36.4 °C)-98.3 °F (36.8 °C)] 97.7 °F (36.5 °C)  Heart Rate:  [83-99] 87  Resp:  [16-22] 16  BP: (104-193)/(67-97) 115/70    Intake and output:    I/O last 3 completed shifts:  In: 2690 [I.V.:2690]  Out: 1150 [Urine:1150]  No intake/output data recorded.    Physical Examination: Examined again today    General Appearance:  Alert and cooperative, not in any acute distress.   Head:  Atraumatic and normocephalic, without obvious abnormality.   Eyes:          PERRLA, conjunctivae and sclerae normal, no Icterus. No pallor. Extraocular movements are within normal limits.   Neck: Supple, trachea midline, no thyromegaly, no carotid bruit.    Lungs:   Chest shape is normal. Breath sounds heard bilaterally equally.  No crackles or wheezing.    Heart:  Normal S1 and S2, no murmur, No JVD   Abdomen:    Normal bowel sounds, continue distention.  No guarding.   Extremities: no edema, no cyanosis, no clubbing.   Skin: No bleeding, bruising or rash.   Neurologic: Awake, alert and oriented times 3. Moves all 4 extremities equally.     Laboratory results:    Results from last 7 days   Lab Units 11/09/20  0610 11/08/20  0554 11/07/20  0609  11/04/20  2020   SODIUM mmol/L 137 136 138   < > 137   POTASSIUM mmol/L 3.5 3.7 3.8   < > 3.9   CHLORIDE mmol/L 102 103 102   < > 96*   CO2 mmol/L 23.8 21.8* 22.3   < > 26.3   BUN mg/dL 47* 50* 58*   < > 37*   CREATININE mg/dL 1.14* 1.55* 2.57*   < > 1.34*   CALCIUM mg/dL 9.9 9.1 8.6   < > 10.5   BILIRUBIN mg/dL  --   --   --   --  0.6   ALK PHOS U/L  --   --   --   --  138*   ALT (SGPT) U/L  --   --   --   --  13   AST (SGOT) U/L  --   --   --   --  23   GLUCOSE mg/dL 161* 174* 153*   < > 166*    < > = values in this interval not displayed.     Results from last 7 days   Lab Units 11/09/20  0610 11/08/20  0554 11/07/20  0609   WBC 10*3/mm3 16.81* 18.02* 15.82*   HEMOGLOBIN g/dL 11.9* 12.1 11.2*   HEMATOCRIT % 35.9 36.9 33.5*   PLATELETS 10*3/mm3 208 177 150         Results from last 7 days   Lab Units 11/09/20  1038 11/05/20  0034   TROPONIN T ng/mL <0.010 <0.010               I have reviewed the patient's laboratory results.    Radiology results:    Imaging Results (Last 24 Hours)     ** No results found for the last 24 hours. **          I have reviewed the patient's radiology reports.    Medication Review:     I have reviewed the patients active and prn medications.       Small bowel obstruction (CMS/HCC)    Spinal stenosis, lumbar region, with neurogenic claudication    Atrial fibrillation (CMS/HCC)    Generalized anxiety disorder    GERD without esophagitis      Assessment:    Small bowel obstruction POA-s/p ex lap with  small bowel resection 11/5/2020  Acute kidney injury  Lactic acidosis   Asymptomatic bacteriuria  atrial fibrillation requiring long-term anticoagulation with Eliquis  Acute anemia from postop blood loss  Chronic benzodiazepine and opiate usage  Chronic: GERD, anxiety/depression, arthritis, back pain       Plan:    Monitor patient in the hospital.  She underwent exploratory laparotomy with small bowel resection due to volvulus around an adhesion performed by Dr. Martin 11/5/2020.  Continue with Zosyn.  Urinary output is appropriate after discontinuation of Olivarez catheter.  Encouraged ambulation and work with PT.  Monitor renal function with routine morning labs.  Patient daily n.p.o. awaiting bowel function returned.  Continue with decreased rate of D5 and add on sliding scale insulin for hyperglycemia.  Blood glucose remains under control.  Patient received 2 units PRBC transfusion 11/6/2020 for postop expected blood loss.  Hemoglobin hematocrit have remained stable.  Continue with IV hydralazine as needed for blood pressure control.    Repeat imaging with KUB today to evaluate abdominal distention.  Bowel sounds have been auscultated.  Family updated.  Further orders as clinical course dictates.    Nigel Falcon DO  11/09/20  15:22 EST    Dictated utilizing Dragon dictation.

## 2020-11-09 NOTE — THERAPY EVALUATION
OT fernando held today d/t complaints of chest pain and stat EKG being ordered; will follow up tomorrow as appropriate.

## 2020-11-09 NOTE — PLAN OF CARE
Goal Outcome Evaluation:  Plan of Care Reviewed With: patient  Progress: improving Interventions implemented as appropriate.

## 2020-11-09 NOTE — PROGRESS NOTES
LOS: 5 days   Patient Care Team:  Lorna Hagen MD as PCP - General (Internal Medicine)  Lorna Hagen MD as Referring Physician (Internal Medicine)      Chief Complaint: complains of gagging      Interval History:     Patient Complaints: See Above, passing flatus    History taken from: patient RN    Vital Signs  Temp:  [97.5 °F (36.4 °C)-98.3 °F (36.8 °C)] 97.7 °F (36.5 °C)  Heart Rate:  [83-99] 87  Resp:  [16-22] 16  BP: (104-193)/(67-97) 115/70    Physical Exam:     General Appearance:    Alert, cooperative, in no acute distress   Head:    Normocephalic, without obvious abnormality, atraumatic   Lungs:     Clear to auscultation,respirations regular, even and                  unlabored    Heart:    Regular rhythm and normal rate, normal S1 and S2, no            murmur, no gallop, no rub, no click   Abdomen:     Wound clean and dry, no signs of infection.  Abdomen distended but no peritoneal signs   Extremities:   Moves all extremities well, no edema, no cyanosis, no             redness   Pulses:   Pulses palpable and equal bilaterally   Skin:   No bleeding, bruising or rash        Results Review:       Lab Results (last 24 hours)     Procedure Component Value Units Date/Time    Troponin [[id number]]  (Normal) Collected: 11/09/20 1038    Specimen: Blood Updated: 11/09/20 1101     Troponin T <0.010 ng/mL     Narrative:      Troponin T Reference Range:  <= 0.03 ng/mL-   Negative for AMI  >0.03 ng/mL-     Abnormal for myocardial necrosis.  Clinicians would have to utilize clinical acumen, EKG, Troponin and serial changes to determine if it is an Acute Myocardial Infarction or myocardial injury due to an underlying chronic condition.       Results may be falsely decreased if patient taking Biotin.      POC Glucose Once [[id number]]  (Abnormal) Collected: 11/09/20 1037    Specimen: Blood Updated: 11/09/20 1042     Glucose 171 mg/dL      Comment: Serial Number: [id number]:  [id number]        Basic Metabolic Panel [[id number]]  (Abnormal) Collected: 11/09/20 0610    Specimen: Blood Updated: 11/09/20 0642     Glucose 161 mg/dL      BUN 47 mg/dL      Creatinine 1.14 mg/dL      Sodium 137 mmol/L      Potassium 3.5 mmol/L      Chloride 102 mmol/L      CO2 23.8 mmol/L      Calcium 9.9 mg/dL      eGFR Non African Amer 45 mL/min/1.73      BUN/Creatinine Ratio 41.2     Anion Gap 11.2 mmol/L     Narrative:      GFR Normal >60  Chronic Kidney Disease <60  Kidney Failure <15      CBC & Differential [[id number]]  (Abnormal) Collected: 11/09/20 0610    Specimen: Blood Updated: 11/09/20 0633    Narrative:      The following orders were created for panel order CBC & Differential.  Procedure                               Abnormality         Status                     ---------                               -----------         ------                     CBC Auto Differential[[id number]]        Abnormal            Final result                 Please view results for these tests on the individual orders.    CBC Auto Differential [[id number]]  (Abnormal) Collected: 11/09/20 0610    Specimen: Blood Updated: 11/09/20 0633     WBC 16.81 10*3/mm3      RBC 3.92 10*6/mm3      Hemoglobin 11.9 g/dL      Hematocrit 35.9 %      MCV 91.6 fL      MCH 30.4 pg      MCHC 33.1 g/dL      RDW 13.4 %      RDW-SD 45.4 fl      MPV 11.1 fL      Platelets 208 10*3/mm3      Neutrophil % 88.8 %      Lymphocyte % 3.2 %      Monocyte % 6.7 %      Eosinophil % 0.0 %      Basophil % 0.2 %      Immature Grans % 1.1 %      Neutrophils, Absolute 14.93 10*3/mm3      Lymphocytes, Absolute 0.53 10*3/mm3      Monocytes, Absolute 1.13 10*3/mm3      Eosinophils, Absolute 0.00 10*3/mm3      Basophils, Absolute 0.04 10*3/mm3      Immature Grans, Absolute 0.18 10*3/mm3      nRBC 0.0 /100 WBC     POC Glucose Once [[id number]]  (Abnormal) Collected: 11/09/20 0609    Specimen: Blood Updated: 11/09/20 0615     Glucose 151 mg/dL      Comment: Serial Number:  [id number]:  [id number]       POC Glucose Once [[id number]]  (Abnormal) Collected: 11/08/20 2036    Specimen: Blood Updated: 11/08/20 2046     Glucose 131 mg/dL      Comment: Serial Number: [id number]:  [id number]       POC Glucose Once [[id number]]  (Abnormal) Collected: 11/08/20 1634    Specimen: Blood Updated: 11/08/20 1643     Glucose 141 mg/dL      Comment: Serial Number: [id number]:  [id number]                 Assessment/Plan       Small bowel obstruction (CMS/HCC)    Spinal stenosis, lumbar region, with neurogenic claudication    Atrial fibrillation (CMS/HCC)    Generalized anxiety disorder    GERD without esophagitis      Very elderly white female is now 4 days status post exploratory laparotomy with reduction of small bowel volvulus due to adhesive band accompanied by the need for small bowel resection with primary anastomosis.  She did inadvertently discontinue her NG tube on the morning of postoperative day #1 and I allow this to stay out for the patient's comfort and for respiratory status, she is still distended, she is passing a slight amount of gas but does have some nausea present.    I have had a clear detailed and extensive discussion with the patient's daughter at the bedside and her other daughter Oralia at [phone] over the phone.  I explained to both the daughters that the patient does have a postoperative ileus and I am going to get a postoperative chest x-ray and abdominal films today, Oralia is convinced that if the patient has an NG tube she will improve and I am not so sure that that is going to be the case.  While an NG tube may be beneficial from gastric distention it is probably not going to help small bowel distention all that much and may inhibit her respiratory status, we will just have to see what her x-rays show.    I told the patient's daughters today over the phone and in person that this is a very high risk patient and her muscle mass has deteriorated since  surgery just as any 89-year-old patient status post exploratory laparotomy would.  We will try her best to ambulate the patient and get her up to a chair, I do not think this is going to miraculously get better overnight and I have told the patient's family that.    Marian, the one daughter at the bedside, and Oralia, who I spoke with over the phone, had no questions for me at the end of the discussion. I have spoken with the Hospitalist Dr. Harrell over the phone today.      Umberto Martin MD  11/09/20  14:57 EST

## 2020-11-09 NOTE — TELEPHONE ENCOUNTER
Patient's daughter called stating that her mom was still in the hospital, and her abdomen is severely swollen. Patient's family would like to know what the plan is for her. .    Her daughter's name is judith and phone number is [phone]

## 2020-11-10 NOTE — NURSING NOTE
Pt pulled out NG tube around 2000. Dr. Pfeiffer notified and said ok to leave out and can have ice chips and sips with meds.

## 2020-11-10 NOTE — THERAPY TREATMENT NOTE
"Patient Name: Tunde Barorw  : 1931    MRN: [id number]                              Today's Date: 11/10/2020       Admit Date: 2020    Visit Dx:     ICD-10-CM ICD-9-CM   1. Small bowel obstruction (CMS/HCC)  K56.609 560.9   2. Renal insufficiency  N28.9 593.9   3. Lactic acidosis  E87.2 276.2   4. Bowel obstruction (CMS/HCC)  K56.609 560.9     Patient Active Problem List   Diagnosis   • Spinal stenosis, lumbar region, with neurogenic claudication   • Small bowel obstruction (CMS/HCC)   • Atrial fibrillation (CMS/HCC)   • Generalized anxiety disorder   • GERD without esophagitis     Past Medical History:   Diagnosis Date   • A-fib (CMS/HCC)    • Anxiety and depression    • Arthritis    • Body piercing     BOTH EARS   • Cataract, bilateral    • Chronic back pain    • Diverticulitis    • GERD (gastroesophageal reflux disease)    • History of nuclear stress test 2017   • [place] (hard of hearing)     SLIGHTLY-NO HEARING AIDS   • Oxygen dependent     AT NIGHT.  UNSURE HOW MUCH SHE IS ON.    • Wears dentures     UPPER PLATE ONLY     Past Surgical History:   Procedure Laterality Date   • APPENDECTOMY     • BREAST SURGERY Right     FIBROID TUMORS X 3    • CARDIAC CATHETERIZATION      STATED \"IT WAS QUITE A WHILE AGO.  I DIDN'T NEED STENTS\".     • CATARACT EXTRACTION W/ INTRAOCULAR LENS IMPLANT Right 2019    Procedure: CATARACT PHACO EXTRACTION WITH INTRAOCULAR LENS IMPLANT RIGHT;  Surgeon: Cindy Olsen MD;  Location: [facility];  Service: Ophthalmology   • CATARACT EXTRACTION W/ INTRAOCULAR LENS IMPLANT Left 2019    Procedure: CATARACT PHACO EXTRACTION WITH INTRAOCULAR LENS IMPLANT LEFT EYE;  Surgeon: Cindy lOsen MD;  Location: [facility];  Service: Ophthalmology   • CHOLECYSTECTOMY     • COLONOSCOPY     • ENDOSCOPY     • EXPLORATORY LAPAROTOMY N/A 2020    Procedure: LAPAROTOMY EXPLORATORY WITH SMALL BOWEL RESECTION;  Surgeon: Umberto Martin MD;  Location: [facility] OR;  Service: General;  " Laterality: N/A;   • HYSTERECTOMY     • LAPAROSCOPIC TUBAL LIGATION     • TONSILLECTOMY       General Information     Row Name 11/10/20 1053          Physical Therapy Time and Intention    Document Type  therapy note (daily note)  -RM     Mode of Treatment  physical therapy  -RM     Row Name 11/10/20 1053          General Information    Patient Profile Reviewed  yes  -RM     Existing Precautions/Restrictions  fall;oxygen therapy device and L/min  -RM     Row Name 11/10/20 1053          Cognition    Orientation Status (Cognition)  oriented x 4  -RM     Row Name 11/10/20 1053          Safety Issues, Functional Mobility    Impairments Affecting Function (Mobility)  balance;endurance/activity tolerance;strength;postural/trunk control  -RM       User Key  (r) = Recorded By, (t) = Taken By, (c) = Cosigned By    Initials Name Provider Type    Isael Mccallum PTA Physical Therapy Assistant        Mobility     Row Name 11/10/20 1054          Sit-Stand Transfer    Sit-Stand Dennis (Transfers)  minimum assist (75% patient effort);verbal cues  -RM     Assistive Device (Sit-Stand Transfers)  walker, front-wheeled  -RM     Row Name 11/10/20 1054          Gait/Stairs (Locomotion)    Dennis Level (Gait)  verbal cues;moderate assist (50% patient effort);minimum assist (75% patient effort);2 person assist;1 person to manage equipment  -RM     Assistive Device (Gait)  walker, front-wheeled  -RM     Distance in Feet (Gait)  46'  -RM     Deviations/Abnormal Patterns (Gait)  base of support, narrow;stride length decreased;cameron decreased;weight shifting decreased  -RM     Bilateral Gait Deviations  heel strike decreased  -RM       User Key  (r) = Recorded By, (t) = Taken By, (c) = Cosigned By    Initials Name Provider Type    Isael Mccallum PTA Physical Therapy Assistant        Obj/Interventions    No documentation.       Goals/Plan    No documentation.       Clinical Impression     Row Name 11/10/20 1050           Pain    Additional Documentation  Pain Scale: FACES Pre/Post-Treatment (Group)  -RM     Row Name 11/10/20 1055          Pain Scale: FACES Pre/Post-Treatment    Pain: FACES Scale, Pretreatment  4-->hurts little more  -RM     Posttreatment Pain Rating  2-->hurts little bit  -RM     Pain Location  abdomen  -RM     Row Name 11/10/20 1055          Plan of Care Review    Plan of Care Reviewed With  patient  -RM     Progress  improving  -RM     Outcome Summary  Pt was found [facility] when PT/OT enterred room.  Pt transferred from sit to stand with min a as well as ambulated 46' min a with rw. Pt was willing to attempt more but was physically unable to at this time.  See flowsheet for details  -RM     Row Name 11/10/20 1055          Vital Signs    Pre SpO2 (%)  95  -RM     O2 Delivery Pre Treatment  supplemental O2  -RM     Post SpO2 (%)  96  -RM     O2 Delivery Post Treatment  supplemental O2  -RM     Pre Patient Position  Sitting  -RM     Intra Patient Position  Standing  -RM     Post Patient Position  Sitting  -RM     Row Name 11/10/20 1053          Positioning and Restraints    Pre-Treatment Position  sitting in chair/recliner  -RM     Post Treatment Position  chair  -RM     In Chair  reclined;call light within reach;encouraged to call for assist;exit alarm on  -RM       User Key  (r) = Recorded By, (t) = Taken By, (c) = Cosigned By    Initials Name Provider Type    RM Isael Alcantar, PTA Physical Therapy Assistant        Outcome Measures     Row Name 11/10/20 0519          How much help from another person do you currently need...    Turning from your back to your side while in flat bed without using bedrails?  3  -RM     Moving from lying on back to sitting on the side of a flat bed without bedrails?  3  -RM     Moving to and from a bed to a chair (including a wheelchair)?  3  -RM     Standing up from a chair using your arms (e.g., wheelchair, bedside chair)?  3  -RM     Climbing 3-5 steps with a railing?  2  -RM      To walk in hospital room?  3  -RM     AM-PAC 6 Clicks Score (PT)  17  -RM     Row Name 11/10/20 1102          Functional Assessment    Outcome Measure Options  AM-PAC 6 Clicks Basic Mobility (PT)  -       User Key  (r) = Recorded By, (t) = Taken By, (c) = Cosigned By    Initials Name Provider Type     Isael Alcantar, PTA Physical Therapy Assistant        Physical Therapy Education                 Title: PT OT SLP Therapies (In Progress)     Topic: Physical Therapy (In Progress)     Point: Mobility training (Done)     Learning Progress Summary           Patient Acceptance, E,TB,D, VU,DU by  at 11/10/2020 1103    Comment: importance of increasing activity daily    Acceptance, E,TB, VU by  at 11/8/2020 1324    Comment: importance of increasing time OOB daily    Acceptance, E,D, DU,VU by  at 11/7/2020 1037    Comment: Pt education for use of rolling walker for safe sit to stand and gait technique.   Family Acceptance, E,D, DU,VU by  at 11/7/2020 1037    Comment: Pt education for use of rolling walker for safe sit to stand and gait technique.                   Point: Home exercise program (Not Started)     Learner Progress:  Not documented in this visit.          Point: Body mechanics (Not Started)     Learner Progress:  Not documented in this visit.                      User Key     Initials Effective Dates Name Provider Type Discipline     03/07/18 -  Ping Mosquera, PTA Physical Therapy Assistant PT     03/07/18 -  Isael Alcantar, PTA Physical Therapy Assistant PT     03/26/19 -  Manuela Otto, PT Physical Therapist PT              PT Recommendation and Plan     Plan of Care Reviewed With: patient  Progress: improving  Outcome Summary: Pt was found [facility] when PT/OT enterred room.  Pt transferred from sit to stand with min a as well as ambulated 46' min a with rw. Pt was willing to attempt more but was physically unable to at this time.  See flowsheet for details     Time Calculation:   PT  Charges     Row Name 11/10/20 1104             Time Calculation    Start Time  0935  -RM      Stop Time  1000  -RM      Time Calculation (min)  25 min  -RM         Time Calculation- PT    Total Timed Code Minutes- PT  25 minute(s)  -RM         Timed Charges    58372 - Gait Training Minutes   15  -RM        User Key  (r) = Recorded By, (t) = Taken By, (c) = Cosigned By    Initials Name Provider Type    Isael Mccallum, PTA Physical Therapy Assistant        Therapy Charges for Today     Code Description Service Date Service Provider Modifiers Qty    31193976144 HC GAIT TRAINING EA 15 MIN 11/10/2020 Isael Alcantar, PTA GP 1          PT G-Codes  Outcome Measure Options: AM-PAC 6 Clicks Basic Mobility (PT)  AM-PAC 6 Clicks Score (PT): 17    Isael Alcantar PTA  11/10/2020

## 2020-11-10 NOTE — PLAN OF CARE
Goal Outcome Evaluation:  Plan of Care Reviewed With: patient  Progress: improving   VSS. Pt pulled out NG tube. No complaints of nausea or pain. Pt rested well. Will continue to monitor.

## 2020-11-10 NOTE — PROGRESS NOTES
LOS: 6 days   Patient Care Team:  Lorna Hagen MD as PCP - General (Internal Medicine)  Lorna Hagen MD as Referring Physician (Internal Medicine)      Chief Complaint: weak, sore throat      Interval History:     Patient Complaints: See Above    History taken from: patient RN    Vital Signs  Temp:  [96.8 °F (36 °C)-98 °F (36.7 °C)] 96.8 °F (36 °C)  Heart Rate:  [76-90] 76  Resp:  [16-22] 19  BP: (104-180)/(67-98) 180/98    Physical Exam:     General Appearance:    Alert, cooperative, in no acute distress, seems very weak   Head:    Normocephalic, without obvious abnormality, atraumatic   Lungs:     Clear to auscultation,respirations regular, even and                  unlabored    Heart:    Regular rhythm and normal rate, normal S1 and S2, no            murmur, no gallop, no rub, no click   Abdomen:   Decreased bowel sounds, slightly distended but less than yesterday, wound OK   Extremities:   Moves all extremities well, no edema, no cyanosis, no             redness   Pulses:   Pulses palpable and equal bilaterally   Skin:   No bleeding, bruising or rash        Results Review:       Lab Results (last 24 hours)     Procedure Component Value Units Date/Time    Basic Metabolic Panel [[id number]]  (Abnormal) Collected: 11/10/20 0556    Specimen: Blood Updated: 11/10/20 0648     Glucose 147 mg/dL      BUN 45 mg/dL      Creatinine 1.03 mg/dL      Sodium 136 mmol/L      Potassium 3.5 mmol/L      Chloride 105 mmol/L      CO2 22.0 mmol/L      Calcium 10.3 mg/dL      eGFR Non African Amer 50 mL/min/1.73      BUN/Creatinine Ratio 43.7     Anion Gap 9.0 mmol/L     Narrative:      GFR Normal >60  Chronic Kidney Disease <60  Kidney Failure <15      CBC & Differential [[id number]]  (Abnormal) Collected: 11/10/20 0556    Specimen: Blood Updated: 11/10/20 0630    Narrative:      The following orders were created for panel order CBC & Differential.  Procedure                               Abnormality          Status                     ---------                               -----------         ------                     CBC Auto Differential[[id number]]        Abnormal            Final result                 Please view results for these tests on the individual orders.    CBC Auto Differential [[id number]]  (Abnormal) Collected: 11/10/20 0556    Specimen: Blood Updated: 11/10/20 0630     WBC 12.39 10*3/mm3      RBC 4.17 10*6/mm3      Hemoglobin 12.6 g/dL      Hematocrit 38.0 %      MCV 91.1 fL      MCH 30.2 pg      MCHC 33.2 g/dL      RDW 13.4 %      RDW-SD 45.2 fl      MPV 11.1 fL      Platelets 214 10*3/mm3      Neutrophil % 85.0 %      Lymphocyte % 4.0 %      Monocyte % 9.4 %      Eosinophil % 0.1 %      Basophil % 0.2 %      Immature Grans % 1.3 %      Neutrophils, Absolute 10.54 10*3/mm3      Lymphocytes, Absolute 0.50 10*3/mm3      Monocytes, Absolute 1.16 10*3/mm3      Eosinophils, Absolute 0.01 10*3/mm3      Basophils, Absolute 0.02 10*3/mm3      Immature Grans, Absolute 0.16 10*3/mm3      nRBC 0.0 /100 WBC     POC Glucose Once [[id number]]  (Abnormal) Collected: 11/10/20 0616    Specimen: Blood Updated: 11/10/20 0626     Glucose 132 mg/dL      Comment: Serial Number: [id number]:  [id number]       POC Glucose Once [[id number]]  (Abnormal) Collected: 11/09/20 2011    Specimen: Blood Updated: 11/09/20 2021     Glucose 145 mg/dL      Comment: Serial Number: [id number]:  [id number]       POC Glucose Once [[id number]]  (Normal) Collected: 11/09/20 1625    Specimen: Blood Updated: 11/09/20 1639     Glucose 128 mg/dL      Comment: Serial Number: [id number]:  [id number]       Troponin [[id number]]  (Normal) Collected: 11/09/20 1038    Specimen: Blood Updated: 11/09/20 1101     Troponin T <0.010 ng/mL     Narrative:      Troponin T Reference Range:  <= 0.03 ng/mL-   Negative for AMI  >0.03 ng/mL-     Abnormal for myocardial necrosis.  Clinicians would have to utilize clinical acumen, EKG, Troponin and serial  changes to determine if it is an Acute Myocardial Infarction or myocardial injury due to an underlying chronic condition.       Results may be falsely decreased if patient taking Biotin.      POC Glucose Once [[id number]]  (Abnormal) Collected: 11/09/20 1037    Specimen: Blood Updated: 11/09/20 1042     Glucose 171 mg/dL      Comment: Serial Number: [id number]:  [id number]                 Assessment/Plan       Small bowel obstruction (CMS/HCC)    Spinal stenosis, lumbar region, with neurogenic claudication    Atrial fibrillation (CMS/HCC)    Generalized anxiety disorder    GERD without esophagitis    Elderly white female now 5 days s/p exp lap with small bowel resection due to volvulus and adhesive band.  She did ambulate today with assistance with a walker and physical therapy down the rowan the length of it which was a nice thing to have the patient do.  She is now up to the chair.  She did have an NG tube inserted yesterday and they did get a lot of air out and 500 cc total of fluid, she then inadvertently pulled it out and we did not replace it.    I have reviewed this chest x-ray taken yesterday with the radiologist, this showed evidence of bibasilar atelectasis versus early infiltrate.  I also reviewed her abdominal films and initially prior to NG tube placement yesterday there was a fair amount of distention of the stomach which I was sure has resolved since NG tube did get a lot of air yesterday.    I did speak with the patient's daughter Oralia over the phone today, she was reached at [phone].  I explained to her the patient's current condition and the fact surrounding the x-rays NG tube and inadvertent removal by the patient yesterday.  We're going to hold off on any further NG tube placement at this time due to the discomfort level of the patient, we will continue to follow along closely.    I have discussed the situation with the Hospitalist.      Umberto Martin MD  11/10/20  09:53 EST

## 2020-11-10 NOTE — THERAPY EVALUATION
"Patient Name: Tunde Barrow  : 1931    MRN: [id number]                              Today's Date: 11/10/2020       Admit Date: 2020    Visit Dx:     ICD-10-CM ICD-9-CM   1. Small bowel obstruction (CMS/HCC)  K56.609 560.9   2. Renal insufficiency  N28.9 593.9   3. Lactic acidosis  E87.2 276.2   4. Bowel obstruction (CMS/HCC)  K56.609 560.9     Patient Active Problem List   Diagnosis   • Spinal stenosis, lumbar region, with neurogenic claudication   • Small bowel obstruction (CMS/HCC)   • Atrial fibrillation (CMS/HCC)   • Generalized anxiety disorder   • GERD without esophagitis     Past Medical History:   Diagnosis Date   • A-fib (CMS/HCC)    • Anxiety and depression    • Arthritis    • Body piercing     BOTH EARS   • Cataract, bilateral    • Chronic back pain    • Diverticulitis    • GERD (gastroesophageal reflux disease)    • History of nuclear stress test 2017   • [place] (hard of hearing)     SLIGHTLY-NO HEARING AIDS   • Oxygen dependent     AT NIGHT.  UNSURE HOW MUCH SHE IS ON.    • Wears dentures     UPPER PLATE ONLY     Past Surgical History:   Procedure Laterality Date   • APPENDECTOMY     • BREAST SURGERY Right     FIBROID TUMORS X 3    • CARDIAC CATHETERIZATION      STATED \"IT WAS QUITE A WHILE AGO.  I DIDN'T NEED STENTS\".     • CATARACT EXTRACTION W/ INTRAOCULAR LENS IMPLANT Right 2019    Procedure: CATARACT PHACO EXTRACTION WITH INTRAOCULAR LENS IMPLANT RIGHT;  Surgeon: Cindy Olsen MD;  Location: [facility];  Service: Ophthalmology   • CATARACT EXTRACTION W/ INTRAOCULAR LENS IMPLANT Left 2019    Procedure: CATARACT PHACO EXTRACTION WITH INTRAOCULAR LENS IMPLANT LEFT EYE;  Surgeon: Cindy Olsen MD;  Location: [facility] OR;  Service: Ophthalmology   • CHOLECYSTECTOMY     • COLONOSCOPY     • ENDOSCOPY     • EXPLORATORY LAPAROTOMY N/A 2020    Procedure: LAPAROTOMY EXPLORATORY WITH SMALL BOWEL RESECTION;  Surgeon: Umberto Martin MD;  Location: [facility] OR;  Service: General;  " Laterality: N/A;   • HYSTERECTOMY     • LAPAROSCOPIC TUBAL LIGATION     • TONSILLECTOMY       General Information     Row Name 11/10/20 1156          OT Time and Intention    Document Type  evaluation  -SD     Mode of Treatment  occupational therapy  -SD     Row Name 11/10/20 1156          General Information    Patient Profile Reviewed  yes  -SD     Prior Level of Function  independent:;all household mobility  -SD     Existing Precautions/Restrictions  fall;oxygen therapy device and L/min;NPO  -SD     Barriers to Rehab  medically complex  -SD     Row Name 11/10/20 1156          Living Environment    Lives With  alone  -SD     Row Name 11/10/20 1156          Home Main Entrance    Number of Stairs, Main Entrance  none  -SD     Row Name 11/10/20 1156          Stairs Within Home, Primary    Number of Stairs, Within Home, Primary  none  -SD     Row Name 11/10/20 1156          Cognition    Orientation Status (Cognition)  oriented x 4  -SD     Row Name 11/10/20 1156          Safety Issues, Functional Mobility    Safety Issues Affecting Function (Mobility)  safety precautions follow-through/compliance;awareness of need for assistance;safety precaution awareness  -SD     Impairments Affecting Function (Mobility)  balance;endurance/activity tolerance;pain;strength;postural/trunk control  -SD       User Key  (r) = Recorded By, (t) = Taken By, (c) = Cosigned By    Initials Name Provider Type    SD Isha Weiner OT Occupational Therapist        Mobility/ADL's     Row Name 11/10/20 1157          Bed Mobility    Comment (Bed Mobility)  in chair  -SD     Row Name 11/10/20 1157          Transfers    Transfers  sit-stand transfer  -SD     Sit-Stand [place] (Transfers)  minimum assist (75% patient effort)  -SD     Row Name 11/10/20 1157          Sit-Stand Transfer    Assistive Device (Sit-Stand Transfers)  walker, front-wheeled  -SD     Row Name 11/10/20 1157          Functional Mobility    Functional Mobility- Ind. Level   minimum assist (75% patient effort)  -SD     Functional Mobility- Device  rolling walker  -SD     Functional Mobility-Distance (Feet)  46  -SD     Functional Mobility- Safety Issues  balance decreased during turns;sequencing ability decreased;step length decreased;weight-shifting ability decreased;supplemental O2  -SD     Row Name 11/10/20 1157          Activities of Daily Living    BADL Assessment/Intervention  bathing;upper body dressing;lower body dressing;grooming;feeding;toileting  -SD     Row Name 11/10/20 1157          Bathing Assessment/Intervention    [place] Level (Bathing)  moderate assist (50% patient effort)  -SD     Row Name 11/10/20 115          Upper Body Dressing Assessment/Training    [place] Level (Upper Body Dressing)  minimum assist (75% patient effort)  -SD     Row Name 11/10/20 115          Lower Body Dressing Assessment/Training    [place] Level (Lower Body Dressing)  moderate assist (50% patient effort)  -SD     Row Name 11/10/20 115          Grooming Assessment/Training    [place] Level (Grooming)  minimum assist (75% patient effort)  -SD     Row Name 11/10/20 1157          Self-Feeding Assessment/Training    [place] Level (Feeding)  not tested NPO  -SD     Row Name 11/10/20 1157          Toileting Assessment/Training    [place] Level (Toileting)  moderate assist (50% patient effort)  -SD     Assistive Devices (Toileting)  commode, bedside without drop arms  -SD       User Key  (r) = Recorded By, (t) = Taken By, (c) = Cosigned By    Initials Name Provider Type    SD Isha Weiner OT Occupational Therapist        Obj/Interventions     Row Name 11/10/20 1202          Range of Motion Comprehensive    General Range of Motion  bilateral upper extremity ROM WFL  -SD     Row Name 11/10/20 1202          Strength Comprehensive (MMT)    General Manual Muscle Testing (MMT) Assessment  upper extremity strength deficits identified  -SD     Comment, General Manual  Muscle Testing (MMT) Assessment  UB 3+/5  -SD       User Key  (r) = Recorded By, (t) = Taken By, (c) = Cosigned By    Initials Name Provider Type    SD Isha Weiner OT Occupational Therapist        Goals/Plan     Row Name 11/10/20 1206          Transfer Goal 1 (OT)    Activity/Assistive Device (Transfer Goal 1, OT)  sit-to-stand/stand-to-sit;walker, rolling  -SD     [place] Level/Cues Needed (Transfer Goal 1, OT)  contact guard assist  -SD     Time Frame (Transfer Goal 1, OT)  long term goal (LTG)  -SD     Progress/Outcome (Transfer Goal 1, OT)  goal ongoing  -SD     Row Name 11/10/20 1206          Dressing Goal 1 (OT)    Activity/Device (Dressing Goal 1, OT)  lower body dressing  -SD     [place]/Cues Needed (Dressing Goal 1, OT)  minimum assist (75% or more patient effort)  -SD     Time Frame (Dressing Goal 1, OT)  2 weeks  -SD     Progress/Outcome (Dressing Goal 1, OT)  goal ongoing  -SD     Row Name 11/10/20 1206          Toileting Goal 1 (OT)    Activity/Device (Toileting Goal 1, OT)  toileting skills, all  -SD     [place] Level/Cues Needed (Toileting Goal 1, OT)  minimum assist (75% or more patient effort)  -SD     Time Frame (Toileting Goal 1, OT)  2 weeks  -SD     Progress/Outcome (Toileting Goal 1, OT)  goal ongoing  -SD     Row Name 11/10/20 1206          Strength Goal 1 (OT)    Strength Goal 1 (OT)  Patient to perform UB ther ex as tolerated  -SD     Time Frame (Strength Goal 1, OT)  long term goal (LTG)  -SD     Progress/Outcome (Strength Goal 1, OT)  goal ongoing  -SD     Row Name 11/10/20 1206          Therapy Assessment/Plan (OT)    Planned Therapy Interventions (OT)  activity tolerance training;adaptive equipment training;BADL retraining;patient/caregiver education/training;strengthening exercise;transfer/mobility retraining  -SD       User Key  (r) = Recorded By, (t) = Taken By, (c) = Cosigned By    Initials Name Provider Type    Isha Arevalo OT Occupational Therapist         Clinical Impression     Row Name 11/10/20 1202          Pain Assessment    Additional Documentation  Pain Scale: FACES Pre/Post-Treatment (Group)  -SD     [facility] Name 11/10/20 1202          Pain Scale: Numbers Pre/Post-Treatment    Pain Intervention(s)  Repositioned;Ambulation/increased activity  -SD     Row Name 11/10/20 1202          Pain Scale: FACES Pre/Post-Treatment    Pain: FACES Scale, Pretreatment  4-->hurts little more  -SD     Posttreatment Pain Rating  2-->hurts little bit  -SD     Row Name 11/10/20 1202          Plan of Care Review    Plan of Care Reviewed With  patient  -SD     Progress  no change  -SD     Outcome Summary  OT eval completed. Patient presents deficits in strength, endurance, balance, mobility and ADL performance. Patient is expected to benefit from continued OT services prior to DC.  -SD     [facility] Name 11/10/20 1202          Therapy Assessment/Plan (OT)    Rehab Potential (OT)  fair, will monitor progress closely  -SD     Criteria for Skilled Therapeutic Interventions Met (OT)  skilled treatment is necessary  -SD     Therapy Frequency (OT)  3 times/wk 5 times if indicated  -SD     Row Name 11/10/20 1202          Therapy Plan Review/Discharge Plan (OT)    Anticipated Discharge Disposition (OT)  other (see comments) unknown; dependent on how patient improves functionally  -SD     [facility] Name 11/10/20 1202          Vital Signs    Pre SpO2 (%)  96  -SD     O2 Delivery Pre Treatment  supplemental O2  -SD     O2 Delivery Intra Treatment  supplemental O2  -SD     Post SpO2 (%)  96  -SD     O2 Delivery Post Treatment  supplemental O2  -SD     [facility] Name 11/10/20 1202          Positioning and Restraints    Pre-Treatment Position  sitting in chair/recliner  -SD     Post Treatment Position  chair  -SD     In Chair  reclined;call light within reach;encouraged to call for assist  -SD       User Key  (r) = Recorded By, (t) = Taken By, (c) = Cosigned By    Initials Name Provider Type    Isha Arevalo  OT Occupational Therapist        Outcome Measures     Row Name 11/10/20 1207          How much help from another is currently needed...    Putting on and taking off regular lower body clothing?  2  -SD     Bathing (including washing, rinsing, and drying)  2  -SD     Toileting (which includes using toilet bed pan or urinal)  2  -SD     Putting on and taking off regular upper body clothing  3  -SD     Taking care of personal grooming (such as brushing teeth)  3  -SD     Eating meals  3  -SD     AM-PAC 6 Clicks Score (OT)  15  -SD     Row Name 11/10/20 1207          Functional Assessment    Outcome Measure Options  AM-PAC 6 Clicks Daily Activity (OT)  -SD       User Key  (r) = Recorded By, (t) = Taken By, (c) = Cosigned By    Initials Name Provider Type    Isha Arevalo OT Occupational Therapist        Occupational Therapy Education                 Title: PT OT SLP Therapies (In Progress)     Topic: Occupational Therapy (In Progress)     Point: ADL training (Done)     Description:   Instruct learner(s) on proper safety adaptation and remediation techniques during self care or transfers.   Instruct in proper use of assistive devices.              Learning Progress Summary           Patient Acceptance, E,TB, VU by SD at 11/10/2020 1207    Comment: Benefit of OT; OT POC                   Point: Home exercise program (Not Started)     Description:   Instruct learner(s) on appropriate technique for monitoring, assisting and/or progressing therapeutic exercises/activities.              Learner Progress:  Not documented in this visit.          Point: Precautions (Not Started)     Description:   Instruct learner(s) on prescribed precautions during self-care and functional transfers.              Learner Progress:  Not documented in this visit.          Point: Body mechanics (Not Started)     Description:   Instruct learner(s) on proper positioning and spine alignment during self-care, functional mobility activities  and/or exercises.              Learner Progress:  Not documented in this visit.                      User Key     Initials Effective Dates Name Provider Type Discipline    SD 03/07/18 -  Isha Weiner OT Occupational Therapist OT              OT Recommendation and Plan  Planned Therapy Interventions (OT): activity tolerance training, adaptive equipment training, BADL retraining, patient/caregiver education/training, strengthening exercise, transfer/mobility retraining  Therapy Frequency (OT): 3 times/wk(5 times if indicated)  Plan of Care Review  Plan of Care Reviewed With: patient  Progress: no change  Outcome Summary: OT eval completed. Patient presents deficits in strength, endurance, balance, mobility and ADL performance. Patient is expected to benefit from continued OT services prior to DC.     Time Calculation:   Time Calculation- OT     Row Name 11/10/20 1208 11/10/20 1104          Time Calculation- OT    OT Start Time  0935  -SD  --     OT Received On  11/10/20  -SD  --     OT Goal Re-Cert Due Date  11/20/20 -SD  --        Timed Charges    77887 - Gait Training Minutes   --  15  -RM       User Key  (r) = Recorded By, (t) = Taken By, (c) = Cosigned By    Initials Name Provider Type    RM Isael Alcantar, PTA Physical Therapy Assistant    SD Isha Weiner, OT Occupational Therapist        Therapy Charges for Today     Code Description Service Date Service Provider Modifiers Qty    23226751949 HC OT EVAL MOD COMPLEXITY 2 11/10/2020 Isha Weiner OT GO 1               Isha Weiner OT  11/10/2020

## 2020-11-10 NOTE — PROGRESS NOTES
[facility]    Name:  Tunde Barrow   Age:  89 y.o.  Sex:  female  :  1931  MRN:  [id number]   Visit Number:  [id number]  Admission Date:  2020  Date Of Service:  11/10/20  Primary Care Physician:  Lorna Hagen MD     LOS: 6 days :  Patient Care Team:  Lorna Hagen MD as PCP - General (Internal Medicine)  Lorna Hagen MD as Referring Physician (Internal Medicine):    Chief Complaint:      Abdominal pain    Subjective / Interval History:     Patient sitting resting comfortably.  Abdominal bloating has improved.  No family present at time of my exam.  Still no flatus or bowel movements.    Prior work-up:  Patient admitted for small bowel obstruction and underwent exploratory laparotomy with small bowel resection 2020 by Dr. Martin.  She has remained n.p.o. awaiting bowel function return.  NG tube has been placed x2 post surgical intervention due to continued abdominal distention.  Patient did pull out NG tube again last night.  Improving slowly.  Ambulating well down the rowan with PT today.  Continued supportive care.    Review of Systems: Reviewed again today    General ROS: Patient denies any fevers, chills or loss of consciousness.  Respiratory ROS: Denies cough or shortness of breath.  Cardiovascular ROS: Denies chest pain or palpitations. No history of exertional chest pain.  Gastrointestinal ROS: Complains of nausea, no vomiting.  Some abdominal pain and bloating.  Neurological ROS: Denies any focal weakness. No loss of consciousness. Denies any numbness.  Dermatological ROS: Denies any redness or pruritis.    Vital Signs:    Temp:  [96.6 °F (35.9 °C)-98 °F (36.7 °C)] 97.5 °F (36.4 °C)  Heart Rate:  [76-91] 91  Resp:  [16-19] 16  BP: (124-180)/() 155/111    Intake and output:    I/O last 3 completed shifts:  In:  [I.V.:190]  Out: 1700 [Urine:1150; Emesis/NG output:550]  I/O this shift:  In: -    Out: 200 [Urine:200]    Physical Examination: Examined again today    General Appearance:  Alert and cooperative, not in any acute distress.   Head:  Atraumatic and normocephalic, without obvious abnormality.   Eyes:          PERRLA, conjunctivae and sclerae normal, no Icterus. No pallor. Extraocular movements are within normal limits.   Neck: Supple, trachea midline, no thyromegaly, no carotid bruit.   Lungs:   Chest shape is normal. Breath sounds heard bilaterally equally.  No crackles or wheezing.    Heart:  Normal S1 and S2, no murmur, No JVD   Abdomen:    Bowel sounds present, distention improved.  No guarding.   Extremities: no edema, no cyanosis, no clubbing.   Skin: No bleeding, bruising or rash.   Neurologic: Awake, alert and oriented times 3. Moves all 4 extremities equally.     Laboratory results:    Results from last 7 days   Lab Units 11/10/20  0556 11/09/20 0610 11/08/20 0554 11/04/20 2020   SODIUM mmol/L 136 137 136   < > 137   POTASSIUM mmol/L 3.5 3.5 3.7   < > 3.9   CHLORIDE mmol/L 105 102 103   < > 96*   CO2 mmol/L 22.0 23.8 21.8*   < > 26.3   BUN mg/dL 45* 47* 50*   < > 37*   CREATININE mg/dL 1.03* 1.14* 1.55*   < > 1.34*   CALCIUM mg/dL 10.3 9.9 9.1   < > 10.5   BILIRUBIN mg/dL  --   --   --   --  0.6   ALK PHOS U/L  --   --   --   --  138*   ALT (SGPT) U/L  --   --   --   --  13   AST (SGOT) U/L  --   --   --   --  23   GLUCOSE mg/dL 147* 161* 174*   < > 166*    < > = values in this interval not displayed.     Results from last 7 days   Lab Units 11/10/20  0556 11/09/20  0610 11/08/20 0554   WBC 10*3/mm3 12.39* 16.81* 18.02*   HEMOGLOBIN g/dL 12.6 11.9* 12.1   HEMATOCRIT % 38.0 35.9 36.9   PLATELETS 10*3/mm3 214 208 177         Results from last 7 days   Lab Units 11/09/20  1038 11/05/20  0034   TROPONIN T ng/mL <0.010 <0.010               I have reviewed the patient's laboratory results.    Radiology results:    Imaging Results (Last 24 Hours)     ** No results found for the last 24  hours. **          I have reviewed the patient's radiology reports.    Medication Review:     I have reviewed the patients active and prn medications.       Small bowel obstruction (CMS/HCC)    Spinal stenosis, lumbar region, with neurogenic claudication    Atrial fibrillation (CMS/HCC)    Generalized anxiety disorder    GERD without esophagitis      Assessment:    Small bowel obstruction POA-s/p ex lap with small bowel resection 11/5/2020  Acute kidney injury  Lactic acidosis   Asymptomatic bacteriuria  atrial fibrillation requiring long-term anticoagulation with Eliquis  Acute anemia from postop blood loss  Chronic benzodiazepine and opiate usage  Chronic: GERD, anxiety/depression, arthritis, back pain       Plan:    Monitor patient in the hospital.  She underwent exploratory laparotomy with small bowel resection due to volvulus around an adhesion performed by Dr. Martin 11/5/2020.  Patient has completed course of Zosyn.  Urinary output is appropriate after discontinuation of Olivarez catheter.  She is now 5 days without enteral nutrition.  While awaiting bowel function return, consider TPN tomorrow. Continue with rate of D5 and sliding scale insulin for hyperglycemia.  Blood glucose remains under control.  Encouraged ambulation and work with PT.  Monitor renal function with routine morning labs.  Patient received 2 units PRBC transfusion 11/6/2020 for postop expected blood loss.  Hemoglobin hematocrit have remained stable.  Continue with IV hydralazine as needed for blood pressure control.    NG tube was placed yesterday due to continued abdominal distention.  A lot of air was released as well as 500 cc total fluid.  Unfortunately patient did pull out NG tube overnight.  Dr. Martin aware.  Will avoid replacement of NG tube at this time due to patient comfort.  Family updated.  Further orders as clinical course dictates.    Nigel Falcon DO  11/10/20  18:18 EST    Dictated utilizing Dragon dictation.

## 2020-11-10 NOTE — PROGRESS NOTES
Adult Nutrition  Assessment/PES    Patient Name:  Tunde Barrow  YOB: 1931  MRN: [id number]  Admit Date:  11/4/2020    Assessment Date:  11/10/2020    Comments:    Recommend:  1. Consider advancing to clear liquid diet order as medically appropriate and tolerated.  2. Establish and encourage PO intake.  3. If pt is to remain NPO > 7 days consider nutrition support. Pt currently NPO day 6.  4. Consider an MVI with minerals daily.     RD to follow pt and available PRN.      Reason for Assessment     Row Name 11/10/20 1423          Reason for Assessment    Reason For Assessment  per organizational policy;identified at risk by screening criteria     Diagnosis  gastrointestinal disease;renal disease;cardiac disease SBO, AVI, Lactic acidosis, Asymptomatic bacteriuria, Afib, GERD, Small bowel resection     Identified At Risk by Screening Criteria  MST SCORE 2+             Labs/Tests/Procedures/Meds     Row Name 11/10/20 1424          Labs/Procedures/Meds    Lab Results Reviewed  reviewed, pertinent     Lab Results Comments  High: Gluc, BUN, Cr        Medications    Pertinent Medications Reviewed  reviewed, pertinent     Pertinent Medications Comments  Novolog           Estimated/Assessed Needs     Row Name 11/10/20 1425          Calculation Measurements    Weight Used For Calculations  65.7 kg (144 lb 13.5 oz) Actual BW        Estimated/Assessed Needs    Additional Documentation  Calorie Requirements (Group);Protein Requirements (Group);[place] Jeor Equation (Group);Fluid Requirements (Group)        Calorie Requirements    Estimated Calorie Need Method  [place]     Estimated Calorie Requirement Comment  1410 - 1610        [place]. Jeor Equation    RMR (Si[place]-St. Jeor Equation)  1082.885     [place]-St. Jeor Activity Factors  -- AF 1.3        Protein Requirements    Weight Used For Protein Calculations  65.7 kg (144 lb 13.5 oz)     Est Protein Requirement Amount (gms/kg)  1.2 gm protein 66 -  78 gm     Estimated Protein Requirements (gms/day)  78.84        Fluid Requirements    Estimated Fluid Requirement Method  [place]-Segar Formula     [place]-Segar Method (over 20 kg)  2814.02         Nutrition Prescription Ordered     Row Name 11/10/20 1428          Nutrition Prescription PO    Current PO Diet  NPO;Sips/ice chips Sips & Chips if ont nauseated         Evaluation of Received Nutrient/Fluid Intake     Row Name 11/10/20 1428          PO Evaluation    Number of Days PO Intake Evaluated  Insufficient Data Pt NPO               Problem/Interventions:  Problem 1     Row Name 11/10/20 1428          Nutrition Diagnoses Problem 1    Problem 1  Inadequate Intake/Infusion     Inadequate Intake Type  Oral     Macronutrient  Kcal;Fat;Fluid;Fiber;Carbohydrate;Protein     Micronutrient  Vitamin;Mineral     Etiology (related to)  MNT for Treatment/Condition     Signs/Symptoms (evidenced by)  NPO         Problem 2     Row Name 11/10/20 1429          Nutrition Diagnoses Problem 2    Problem 2  Impaired Nutrient Utilization     Etiology (related to)  Medical Diagnosis     Renal  AVI     Signs/Symptoms (evidenced by)  Biochemical     Specific Labs Noted  BUN;Creatinine             Intervention Goal     Row Name 11/10/20 1423          Intervention Goal    General  Meet nutritional needs for age/condition;Improved nutrition related lab(s);Nutrition support treatment     PO  Advance diet;Initiate feeding;Meet estimated needs     Weight  Maintain weight         Nutrition Intervention     Row Name 11/10/20 1434          Nutrition Intervention    RD/Tech Action  Follow Tx progress;Recommend/ordered     Recommended/Ordered  PN;EN;Diet         Nutrition Prescription     Row Name 11/10/20 1433          Nutrition Prescription PO    PO Prescription  Begin/change diet     Begin/Change Diet to  Clear Liquid     New PO Prescription Ordered?  No, recommended        Nutrition Prescription PN    Parenteral Prescription  PN begin/change         Other Orders    Obtain Weight  Daily     Obtain Weight Ordered?  No, recommended     Supplement  Vitamin mineral supplement     Supplement Ordered?  No, recommended         Education/Evaluation     Row Name 11/10/20 3952          Education    Education  Will Instruct as appropriate        Monitor/Evaluation    Monitor  Per protocol;I&O;PO intake;Pertinent labs;Weight;Skin status           Electronically signed by:  Hanny Solis RD  11/10/20 14:31 EST

## 2020-11-10 NOTE — PLAN OF CARE
Problem: Adult Inpatient Plan of Care  Goal: Plan of Care Review  Recent Flowsheet Documentation  Taken 11/10/2020 1055 by Isael Alcantar, PTA  Progress: improving  Plan of Care Reviewed With: patient  Outcome Summary: Pt was found uic when PT/OT enterred room.  Pt transferred from sit to stand with min a as well as ambulated 46' min a with rw. Pt requires assist of to for equipment and chair follow.  Pt was willing to attempt more but was physically unable to at this time.  See flowsheet for details

## 2020-11-10 NOTE — PLAN OF CARE
Problem: Adult Inpatient Plan of Care  Goal: Plan of Care Review  Recent Flowsheet Documentation  Taken 11/10/2020 1202 by Isha Weiner OT  Progress: no change  Plan of Care Reviewed With: patient  Outcome Summary: OT eval completed. Patient presents deficits in strength, endurance, balance, mobility and ADL performance. Patient is expected to benefit from continued OT services prior to DC.

## 2020-11-11 NOTE — CONSULTS
Adult Nutrition  Assessment/PES    Patient Name:  Tunde Barrow  YOB: 1931  MRN: [id number]  Admit Date:  11/4/2020    Assessment Date:  11/11/2020    Comments:    RD received consult for PPN Recs.      Rec #1: Initiate PPN Dextrose 10%, AA 4.25% @ 15 ml/hr advancing to 25 ml/hr goal rate after 8 hours. PPN to provide 204 Dextrose kcal, 25.5 gms protein.   Rec #2: Hold lipids.   Rec. #3: Give PPN Lytes.   Rec.#4: Replacing K+  Rec. #5: MVI daily and Trace elements Q3rd Day; hold PO multivitamin w/ iron minerals  Rec. #6: Labs: Mg, Ph, K+ with AM labs and TG Q3rd day.     RD to follow Pt. Consult RD PRN.        Reason for Assessment     Row Name 11/11/20 1210          Reason for Assessment    Reason For Assessment  physician consult;TF/PN     Diagnosis  gastrointestinal disease;renal disease;cardiac disease SBO, AVI, Lactic acidosis, Asymptomatic bacteriuria, Afib, GERD, Small bowel resection     Identified At Risk by Screening Criteria  MST SCORE 2+             Labs/Tests/Procedures/Meds     Row Name 11/11/20 1211          Labs/Procedures/Meds    Lab Results Reviewed  reviewed, pertinent     Lab Results Comments  Low: K; High: BUN, Gluc        Medications    Pertinent Medications Reviewed  reviewed, pertinent     Pertinent Medications Comments  Novolog, mulitivitamin w/ minerals, dextrose             Nutrition Prescription Ordered     Row Name 11/11/20 1211          Nutrition Prescription PO    Current PO Diet  NPO;Sips/ice chips Sips & Chips if only nauseated         Evaluation of Received Nutrient/Fluid Intake     Row Name 11/11/20 1212          PO Evaluation    Number of Days PO Intake Evaluated  Insufficient Data               Problem/Interventions:  Problem 1     Row Name 11/11/20 1212          Nutrition Diagnoses Problem 1    Problem 1  Inadequate Intake/Infusion     Inadequate Intake Type  Oral     Macronutrient  Kcal;Fat;Fluid;Fiber;Carbohydrate;Protein     Micronutrient  Vitamin;Mineral      Etiology (related to)  MNT for Treatment/Condition     Signs/Symptoms (evidenced by)  NPO         Problem 2     Row Name 11/11/20 1212          Nutrition Diagnoses Problem 2    Problem 2  Impaired Nutrient Utilization     Etiology (related to)  Medical Diagnosis     Renal  AVI     Signs/Symptoms (evidenced by)  Biochemical     Specific Labs Noted  BUN             Intervention Goal     Row Name 11/11/20 1212          Intervention Goal    General  Improved nutrition related lab(s);Nutrition support treatment;Meet nutritional needs for age/condition     TF/PN  Inititiate TF/PN;Deliver estimated need (%)     Weight  Maintain weight         Nutrition Intervention     Row Name 11/11/20 1212          Nutrition Intervention    RD/Tech Action  Follow Tx progress;Recommend/ordered     Recommended/Ordered  PN         Nutrition Prescription     Row Name 11/11/20 1212          Nutrition Prescription PO    PO Prescription  Other (comment) Continue NPO as pt receives PN     New PO Prescription Ordered?  No, recommended        Nutrition Prescription PN    Parenteral Prescription  PN begin/change     PN Route  Peripheral     Dextrose Concentration (%)  10 %     Dextrose (Kcal)  204     Amino Acid Concentration (%)  4.25%     Amino Acid (gm)  25.5     PN Goal Rate (mL/hr)  25 mL/hr     PN Starting Rate (mL/hr)  15 mL/hr     PN Goal Volume (mL)  600 mL     PN Starting Volume (mL)  360 mL     Lipid Concentration (%)  Other (comment) Hold     New PN Prescription Ordered?  Yes        Other Orders    Obtain Weight  Daily     Obtain Weight Ordered?  No, recommended     Supplement  Vitamin mineral supplement     Supplement Ordered?  No, recommended     Labs  Mg++;Phos;K+;Other (comment) TG     Labs Ordered?  No, recommended         Education/Evaluation     Row Name 11/11/20 1217          Education    Education  Will Instruct as appropriate        Monitor/Evaluation    Monitor  Per protocol;I&O;PN delivery/tolerance;Pertinent  labs;Weight;Skin status           Electronically signed by:  Yara Givens RD  11/11/20 12:26 EST

## 2020-11-11 NOTE — PLAN OF CARE
Goal Outcome Evaluation:  Plan of Care Reviewed With: patient  Progress: no change Pain control, ambulate when pt can tolerate it. Awaiting nutrition plan per care team.

## 2020-11-11 NOTE — PROGRESS NOTES
[facility]    Name:  Tunde Barrow   Age:  89 y.o.  Sex:  female  :  1931  MRN:  [id number]   Visit Number:  [id number]  Admission Date:  2020  Date Of Service:  20  Primary Care Physician:  Lorna Hagen MD     LOS: 7 days :  Patient Care Team:  Lorna Hagen MD as PCP - General (Internal Medicine)  Lorna Hagen MD as Referring Physician (Internal Medicine):    Chief Complaint:      Abdominal pain and fullness.    Subjective / Interval History:     Ms. Barrow was seen and examined this morning.  She is currently sitting up on the chair and is in mild distress because of her abdominal pain and distention.  She denies any chest pain or shortness of breath.  She was admitted on 2020 with small bowel obstruction and subsequently underwent exploratory laparotomy on 2020 with reduction of small bowel volvulus, lysis of adhesion band and small bowel resection.  She has had a prolonged postoperative ileus.  She did have NG tube aspiration until last night which she pulled it out.  Patient was seen by Dr. Martin this morning she is currently on ice chips and medications with sips of liquids.  She states that she did a small amount of flatus earlier today.  She does complain of generalized weakness.  Prior to admission, she lived by herself.  She uses oxygen at night.  She states that her daughter lives close by and helps her out.  Previous physician documentation, laboratory and imaging data have been reviewed.    Review of Systems:     General ROS: Patient denies any fevers, chills or loss of consciousness.  Generalized weakness.  Respiratory ROS: Denies cough or shortness of breath.  Cardiovascular ROS: Denies chest pain or palpitations. No history of exertional chest pain.  Gastrointestinal ROS: Abdominal pain, distention.  Denies any vomiting.  Neurological ROS: Denies any focal weakness. No loss of  consciousness. Denies any numbness.  Dermatological ROS: Denies any redness or pruritis.    Vital Signs:    Temp:  [97.5 °F (36.4 °C)-98.7 °F (37.1 °C)] 98.7 °F (37.1 °C)  Heart Rate:  [] 89  Resp:  [16-24] 18  BP: (114-166)/() 114/78    Intake and output:    I/O last 3 completed shifts:  In: -   Out: 750 [Urine:600; Emesis/NG output:150]  No intake/output data recorded.    Physical Examination:    General Appearance:  Alert and cooperative, in mild distress due to abdominal fullness and pain.   Head:  Atraumatic and normocephalic, without obvious abnormality.   Eyes:          Conjunctivae and sclerae normal, no Icterus. No pallor. Extraocular movements are within normal limits.   Neck: Supple, trachea midline, no thyromegaly, no carotid bruit.   Lungs:   Chest shape is normal. Breath sounds heard bilaterally equally.  No wheezing.  Occasional basal crackles heard.  No pleural rub or bronchial breathing.   Heart:  Normal S1 and S2, no murmur, no gallop, no rub. No JVD   Abdomen:   Bowel sounds heard but decreased, no masses, no organomegaly. Soft, diffuse minimal tenderness present, minimal gaseous distention noted, no guarding, no rebound tenderness.  Midline surgical scar with staples noted healing well.   Extremities: Moves all extremities, no edema, no cyanosis, no clubbing.   Skin: No bleeding or rash.   Neurologic: Awake, alert and oriented times 3. Moves all 4 extremities equally but does have generalized weakness.     Laboratory results:    Results from last 7 days   Lab Units 11/11/20  0657 11/10/20  0556 11/09/20  0610  11/04/20  2020   SODIUM mmol/L 138 136 137   < > 137   POTASSIUM mmol/L 3.4* 3.5 3.5   < > 3.9   CHLORIDE mmol/L 103 105 102   < > 96*   CO2 mmol/L 22.0 22.0 23.8   < > 26.3   BUN mg/dL 38* 45* 47*   < > 37*   CREATININE mg/dL 0.76 1.03* 1.14*   < > 1.34*   CALCIUM mg/dL 9.9 10.3 9.9   < > 10.5   BILIRUBIN mg/dL  --   --   --   --  0.6   ALK PHOS U/L  --   --   --   --  138*    ALT (SGPT) U/L  --   --   --   --  13   AST (SGOT) U/L  --   --   --   --  23   GLUCOSE mg/dL 177* 147* 161*   < > 166*    < > = values in this interval not displayed.     Results from last 7 days   Lab Units 11/11/20  0657 11/10/20  0556 11/09/20  0610   WBC 10*3/mm3 15.74* 12.39* 16.81*   HEMOGLOBIN g/dL 12.3 12.6 11.9*   HEMATOCRIT % 36.8 38.0 35.9   PLATELETS 10*3/mm3 214 214 208         Results from last 7 days   Lab Units 11/09/20  1038 11/05/20  0034   TROPONIN T ng/mL <0.010 <0.010               I have reviewed the patient's laboratory results.    Radiology results:    Imaging Results (Last 24 Hours)     ** No results found for the last 24 hours. **        I have reviewed the patient's radiology reports.    Medication Review:     I have reviewed the patient's active and prn medications.     Problem List:      Small bowel obstruction (CMS/HCC)    Spinal stenosis, lumbar region, with neurogenic claudication    Atrial fibrillation (CMS/HCC)    Generalized anxiety disorder    GERD without esophagitis    Assessment:    1.  Acute small bowel obstruction secondary to small bowel volvulus and adhesions, POA.  2.  Status post exploratory laparotomy, small bowel resection and additional lysis on 11/5/2020.  3.  Prolonged postoperative ileus.  4.  Paroxysmal atrial fibrillation, anticoagulation is currently on hold.  5.  Essential hypertension.  6.  Generalized anxiety disorder.  7.  Bilateral lower lobe atelectasis.    Plan:    Ms. Barrow is currently hemodynamically stable and has mild elevation of blood pressures.  Unfortunately she has not started her bowel function yet.  We will continue her on IV fluids.  She does have significant generalized weakness and would benefit from parenteral nutrition.  Discussed with dietitian and we will start her on peripheral parenteral nutrition today.    She will be continued on physical therapy.  I have encouraged her to walk around in the room if possible.  She has been  advised to use the incentive spirometry.  I have discussed the patient's condition with Dr. Martin over the phone.  Discussed with the patient's brother who is at the bedside.  Further recommendations depend upon her clinical course and resumption of bowel function.  Discussed with nursing staff and multidisciplinary team.    Florian Chandler MD  11/11/20  11:43 EST    Dictated utilizing Dragon dictation.

## 2020-11-11 NOTE — THERAPY TREATMENT NOTE
"Patient Name: Tunde Barrow  : 1931    MRN: [id number]                              Today's Date: 2020       Admit Date: 2020    Visit Dx:     ICD-10-CM ICD-9-CM   1. Small bowel obstruction (CMS/HCC)  K56.609 560.9   2. Renal insufficiency  N28.9 593.9   3. Lactic acidosis  E87.2 276.2   4. Bowel obstruction (CMS/HCC)  K56.609 560.9     Patient Active Problem List   Diagnosis   • Spinal stenosis, lumbar region, with neurogenic claudication   • Small bowel obstruction (CMS/HCC)   • Atrial fibrillation (CMS/HCC)   • Generalized anxiety disorder   • GERD without esophagitis     Past Medical History:   Diagnosis Date   • A-fib (CMS/HCC)    • Anxiety and depression    • Arthritis    • Body piercing     BOTH EARS   • Cataract, bilateral    • Chronic back pain    • Diverticulitis    • GERD (gastroesophageal reflux disease)    • History of nuclear stress test 2017   • Jena (hard of hearing)     SLIGHTLY-NO HEARING AIDS   • Oxygen dependent     AT NIGHT.  UNSURE HOW MUCH SHE IS ON.    • Wears dentures     UPPER PLATE ONLY     Past Surgical History:   Procedure Laterality Date   • APPENDECTOMY     • BREAST SURGERY Right     FIBROID TUMORS X 3    • CARDIAC CATHETERIZATION      STATED \"IT WAS QUITE A WHILE AGO.  I DIDN'T NEED STENTS\".     • CATARACT EXTRACTION W/ INTRAOCULAR LENS IMPLANT Right 2019    Procedure: CATARACT PHACO EXTRACTION WITH INTRAOCULAR LENS IMPLANT RIGHT;  Surgeon: Cindy Olsen MD;  Location: [facility];  Service: Ophthalmology   • CATARACT EXTRACTION W/ INTRAOCULAR LENS IMPLANT Left 2019    Procedure: CATARACT PHACO EXTRACTION WITH INTRAOCULAR LENS IMPLANT LEFT EYE;  Surgeon: Cindy Olsen MD;  Location: [facility] OR;  Service: Ophthalmology   • CHOLECYSTECTOMY     • COLONOSCOPY     • ENDOSCOPY     • EXPLORATORY LAPAROTOMY N/A 2020    Procedure: LAPAROTOMY EXPLORATORY WITH SMALL BOWEL RESECTION;  Surgeon: Umberto Martin MD;  Location: [facility] OR;  Service: General;  " Laterality: N/A;   • HYSTERECTOMY     • LAPAROSCOPIC TUBAL LIGATION     • TONSILLECTOMY       General Information     Row Name 11/11/20 0934          Physical Therapy Time and Intention    Document Type  therapy note (daily note)  -RM     Mode of Treatment  physical therapy  -RM     Row Name 11/11/20 0934          General Information    Existing Precautions/Restrictions  fall;oxygen therapy device and L/min;NPO 5 lpm  -RM     Row Name 11/11/20 0934          Cognition    Orientation Status (Cognition)  oriented x 3  -RM     Row Name 11/11/20 0934          Safety Issues, Functional Mobility    Impairments Affecting Function (Mobility)  balance;endurance/activity tolerance;pain;strength;postural/trunk control  -RM       User Key  (r) = Recorded By, (t) = Taken By, (c) = Cosigned By    Initials Name Provider Type    Isael Mccallum, PABLO Physical Therapy Assistant        Mobility     Row Name 11/11/20 1427          Bed Mobility    Supine-Sit [place] (Bed Mobility)  minimum assist (75% patient effort)  -RM     Assistive Device (Bed Mobility)  bed rails;head of bed elevated  -RM     Row Name 11/11/20 1427          Sit-Stand Transfer    Sit-Stand [place] (Transfers)  minimum assist (75% patient effort)  -RM     Assistive Device (Sit-Stand Transfers)  walker, front-wheeled  -RM     Row Name 11/11/20 1427          Gait/Stairs (Locomotion)    [place] Level (Gait)  verbal cues;minimum assist (75% patient effort)  -RM     Assistive Device (Gait)  walker, front-wheeled  -RM     Distance in Feet (Gait)  3'  -RM     Deviations/Abnormal Patterns (Gait)  base of support, narrow;stride length decreased;cameron decreased;weight shifting decreased  -RM     Bilateral Gait Deviations  heel strike decreased  -RM       User Key  (r) = Recorded By, (t) = Taken By, (c) = Cosigned By    Initials Name Provider Type    Isael Mccallum, PABLO Physical Therapy Assistant        Obj/Interventions     Row Name 11/11/20 1427           Motor Skills    Therapeutic Exercise  hip;knee;ankle  -RM     [facility] Name 11/11/20 1428          Hip (Therapeutic Exercise)    Hip (Therapeutic Exercise)  isometric exercises;strengthening exercise  -RM     Hip Isometrics (Therapeutic Exercise)  gluteal sets;bilateral;10 repetitions;5 repetitions  -RM     Hip Strengthening (Therapeutic Exercise)  bilateral;marching while seated;10 repetitions;5 repetitions  -RM     [facility] Name 11/11/20 1428          Knee (Therapeutic Exercise)    Knee (Therapeutic Exercise)  strengthening exercise  -RM     Knee Strengthening (Therapeutic Exercise)  marching while seated;10 repetitions;5 repetitions  -RM     [facility] Name 11/11/20 1428          Ankle (Therapeutic Exercise)    Ankle (Therapeutic Exercise)  AROM (active range of motion)  -RM     Ankle AROM (Therapeutic Exercise)  bilateral;dorsiflexion;plantarflexion;10 repetitions;5 repetitions  -RM       User Key  (r) = Recorded By, (t) = Taken By, (c) = Cosigned By    Initials Name Provider Type    RM Isael Alcantar, PTA Physical Therapy Assistant        Goals/Plan    No documentation.       Clinical Impression     Row Name 11/11/20 1430          Pain    Additional Documentation  Pain Scale: Numbers Pre/Post-Treatment (Group)  -RM     Row Name 11/11/20 1430          Pain Scale: Numbers Pre/Post-Treatment    Pretreatment Pain Rating  0/10 - no pain  -RM     Posttreatment Pain Rating  0/10 - no pain  -RM     Row Name 11/11/20 1430          Plan of Care Review    Plan of Care Reviewed With  patient  -     Progress  improving  -     Outcome Summary  Pt performed mobility tasks with min a.  Pt attempted to ambulate but reports feeling to dizzy.  Pt was able to go 3' bed to chair min a with rw.  Pt was then able to perform B LE strengthening in sitting. See flowsheet for details.  -     Row Name 11/11/20 1430          Vital Signs    Pre SpO2 (%)  96  -RM     O2 Delivery Pre Treatment  supplemental O2  -RM     Post SpO2 (%)  93  -RM      O2 Delivery Post Treatment  supplemental O2  -RM     Pre Patient Position  Supine  -RM     Intra Patient Position  Standing  -RM     Post Patient Position  Sitting  -RM     Row Name 11/11/20 1430          Positioning and Restraints    Pre-Treatment Position  in bed  -RM     Post Treatment Position  chair  -RM     In Chair  reclined;call light within reach;encouraged to call for assist;exit alarm on;notified nsg  -RM       User Key  (r) = Recorded By, (t) = Taken By, (c) = Cosigned By    Initials Name Provider Type    Isael Mccallum, PABLO Physical Therapy Assistant        Outcome Measures     Row Name 11/11/20 1440          How much help from another person do you currently need...    Turning from your back to your side while in flat bed without using bedrails?  2  -RM     Moving from lying on back to sitting on the side of a flat bed without bedrails?  2  -RM     Moving to and from a bed to a chair (including a wheelchair)?  3  -RM     Standing up from a chair using your arms (e.g., wheelchair, bedside chair)?  3  -RM     Climbing 3-5 steps with a railing?  1  -RM     To walk in hospital room?  2  -RM     AM-PAC 6 Clicks Score (PT)  13  -RM     Row Name 11/11/20 1440          Functional Assessment    Outcome Measure Options  AM-PAC 6 Clicks Basic Mobility (PT)  -RM       User Key  (r) = Recorded By, (t) = Taken By, (c) = Cosigned By    Initials Name Provider Type    Isael Mccallum, PABLO Physical Therapy Assistant        Physical Therapy Education                 Title: PT OT SLP Therapies (In Progress)     Topic: Physical Therapy (In Progress)     Point: Mobility training (Done)     Learning Progress Summary           Patient Acceptance, E,TB,D, VU,NR by  at 11/11/2020 1441    Comment: exercise techniques    Acceptance, E,TB,D, VU,DU by  at 11/10/2020 1103    Comment: importance of increasing activity daily    Acceptance, E,TB, VU by  at 11/8/2020 1324    Comment: importance of increasing time OOB  daily    Acceptance, E,D, DU,VU by  at 11/7/2020 1037    Comment: Pt education for use of rolling walker for safe sit to stand and gait technique.   Family Acceptance, E,D, DU,VU by  at 11/7/2020 1037    Comment: Pt education for use of rolling walker for safe sit to stand and gait technique.                   Point: Home exercise program (Done)     Learning Progress Summary           Patient Acceptance, E,TB,D, VU,NR by  at 11/11/2020 1441    Comment: exercise techniques                   Point: Body mechanics (Not Started)     Learner Progress:  Not documented in this visit.                      User Key     Initials Effective Dates Name Provider Type Discipline     03/07/18 -  Ping Mosquera, PTA Physical Therapy Assistant PT     03/07/18 -  Isael Alcantar, PABLO Physical Therapy Assistant PT     03/26/19 -  Manuela Otto, PT Physical Therapist PT              PT Recommendation and Plan     Plan of Care Reviewed With: patient  Progress: improving  Outcome Summary: Pt performed mobility tasks with min a.  Pt attempted to ambulate but reports feeling to dizzy.  Pt was able to go 3' bed to chair min a with rw.  Pt was then able to perform B LE strengthening in sitting. See flowsheet for details.     Time Calculation:   PT Charges     Row Name 11/11/20 1441             Time Calculation    Start Time  0934  -RM      Stop Time  0954  -RM      Time Calculation (min)  20 min  -RM      PT Received On  11/11/20  -RM      PT Goal Re-Cert Due Date  11/17/20  -RM         Time Calculation- PT    Total Timed Code Minutes- PT  20 minute(s)  -RM         Timed Charges    62910 - PT Therapeutic Exercise Minutes  20  -RM        User Key  (r) = Recorded By, (t) = Taken By, (c) = Cosigned By    Initials Name Provider Type     Isael Alcantar, PTA Physical Therapy Assistant        Therapy Charges for Today     Code Description Service Date Service Provider Modifiers Qty    59278095298 HC GAIT TRAINING EA 15 MIN  11/10/2020 Isael Alcantar, PTA GP 1    41283000188 HC PT THER PROC EA 15 MIN 11/11/2020 Isael Alcantar, PTA GP 1          PT G-Codes  Outcome Measure Options: AM-PAC 6 Clicks Basic Mobility (PT)  AM-PAC 6 Clicks Score (PT): 13  AM-PAC 6 Clicks Score (OT): 15    Isael Alcantar PTA  11/11/2020

## 2020-11-11 NOTE — PLAN OF CARE
Problem: Adult Inpatient Plan of Care  Goal: Plan of Care Review  Recent Flowsheet Documentation  Taken 11/11/2020 1257 by Isha Weiner OT  Progress: no change  Plan of Care Reviewed With: patient  Outcome Summary: OT tx completed. Patient required min A for BM, tf and 3' to chair, was unable to walk further d/t dizziness. Completed grooming tasks with sup, followed by UB AROM. Continue OT POC

## 2020-11-11 NOTE — PLAN OF CARE
Problem: Adult Inpatient Plan of Care  Goal: Plan of Care Review  Recent Flowsheet Documentation  Taken 11/11/2020 1430 by Isael Alcantar, PTA  Progress: improving  Plan of Care Reviewed With: patient  Outcome Summary: Pt performed mobility tasks with min a.  Pt attempted to ambulate but reports feeling to dizzy.  Pt was able to go 3' bed to chair min a with rw.  Pt was then able to perform B LE strengthening in sitting. See flowsheet for details.

## 2020-11-12 NOTE — THERAPY TREATMENT NOTE
"Patient Name: Tunde Barrow  : 1931    MRN: [id number]                              Today's Date: 2020       Admit Date: 2020    Visit Dx:     ICD-10-CM ICD-9-CM   1. Small bowel obstruction (CMS/HCC)  K56.609 560.9   2. Renal insufficiency  N28.9 593.9   3. Lactic acidosis  E87.2 276.2   4. Bowel obstruction (CMS/HCC)  K56.609 560.9     Patient Active Problem List   Diagnosis   • Spinal stenosis, lumbar region, with neurogenic claudication   • Small bowel obstruction (CMS/HCC)   • Atrial fibrillation (CMS/HCC)   • Generalized anxiety disorder   • GERD without esophagitis     Past Medical History:   Diagnosis Date   • A-fib (CMS/HCC)    • Anxiety and depression    • Arthritis    • Body piercing     BOTH EARS   • Cataract, bilateral    • Chronic back pain    • Diverticulitis    • GERD (gastroesophageal reflux disease)    • History of nuclear stress test 2017   • [place] (hard of hearing)     SLIGHTLY-NO HEARING AIDS   • Oxygen dependent     AT NIGHT.  UNSURE HOW MUCH SHE IS ON.    • Wears dentures     UPPER PLATE ONLY     Past Surgical History:   Procedure Laterality Date   • APPENDECTOMY     • BREAST SURGERY Right     FIBROID TUMORS X 3    • CARDIAC CATHETERIZATION      STATED \"IT WAS QUITE A WHILE AGO.  I DIDN'T NEED STENTS\".     • CATARACT EXTRACTION W/ INTRAOCULAR LENS IMPLANT Right 2019    Procedure: CATARACT PHACO EXTRACTION WITH INTRAOCULAR LENS IMPLANT RIGHT;  Surgeon: Cindy Olsen MD;  Location: [facility];  Service: Ophthalmology   • CATARACT EXTRACTION W/ INTRAOCULAR LENS IMPLANT Left 2019    Procedure: CATARACT PHACO EXTRACTION WITH INTRAOCULAR LENS IMPLANT LEFT EYE;  Surgeon: Cindy Olsen MD;  Location: [facility] OR;  Service: Ophthalmology   • CHOLECYSTECTOMY     • COLONOSCOPY     • ENDOSCOPY     • EXPLORATORY LAPAROTOMY N/A 2020    Procedure: LAPAROTOMY EXPLORATORY WITH SMALL BOWEL RESECTION;  Surgeon: Umberto Martin MD;  Location: [facility] OR;  Service: General;  " Laterality: N/A;   • HYSTERECTOMY     • LAPAROSCOPIC TUBAL LIGATION     • TONSILLECTOMY       General Information     Row Name 11/12/20 1233          Physical Therapy Time and Intention    Document Type  therapy note (daily note)  -     Mode of Treatment  physical therapy  -RM     Row Name 11/12/20 1233          General Information    Patient Profile Reviewed  yes  -RM     Existing Precautions/Restrictions  fall;oxygen therapy device and L/min;NPO 2 lpm pnc  -RM     Row Name 11/12/20 1233          Cognition    Orientation Status (Cognition)  oriented x 3  -RM     Row Name 11/12/20 1233          Safety Issues, Functional Mobility    Safety Issues Affecting Function (Mobility)  impulsivity;insight into deficits/self-awareness;positioning of assistive device;safety precaution awareness;safety precautions follow-through/compliance;sequencing abilities  -RM     Impairments Affecting Function (Mobility)  balance;endurance/activity tolerance;pain;strength;postural/trunk control  -RM       User Key  (r) = Recorded By, (t) = Taken By, (c) = Cosigned By    Initials Name Provider Type    RM Isael Alcantar, PTA Physical Therapy Assistant        Mobility     Row Name 11/12/20 1235          Bed Mobility    Supine-Sit [place] (Bed Mobility)  moderate assist (50% patient effort);verbal cues;nonverbal cues (demo/gesture)  -RM     Assistive Device (Bed Mobility)  bed rails;head of bed elevated;draw sheet  -     Row Name 11/12/20 1235          Sit-Stand Transfer    Sit-Stand [place] (Transfers)  minimum assist (75% patient effort);moderate assist (50% patient effort);verbal cues;nonverbal cues (demo/gesture)  -RM     Assistive Device (Sit-Stand Transfers)  walker, front-wheeled  -     Row Name 11/12/20 1231          Gait/Stairs (Locomotion)    [place] Level (Gait)  verbal cues;moderate assist (50% patient effort)  -RM     Assistive Device (Gait)  walker, front-wheeled  -RM     Distance in Feet (Gait)  12  -RM      Deviations/Abnormal Patterns (Gait)  base of support, narrow;stride length decreased;cameron decreased;weight shifting decreased;ataxic  -RM     Bilateral Gait Deviations  heel strike decreased;leans right;weight shift ability decreased  -RM       User Key  (r) = Recorded By, (t) = Taken By, (c) = Cosigned By    Initials Name Provider Type    Isael Mccallum, PABLO Physical Therapy Assistant        Obj/Interventions    No documentation.       Goals/Plan    No documentation.       Clinical Impression     Row Name 11/12/20 1238          Pain    Additional Documentation  Pain Scale: FACES Pre/Post-Treatment (Group)  -     Row Name 11/12/20 1238          Pain Scale: Numbers Pre/Post-Treatment    Pain Intervention(s)  Repositioned;Ambulation/increased activity  -     Row Name 11/12/20 1238          Pain Scale: FACES Pre/Post-Treatment    Pain: FACES Scale, Pretreatment  4-->hurts little more  -RM     Posttreatment Pain Rating  4-->hurts little more  -RM     Pain Location  abdomen  -     Row Name 11/12/20 1238          Plan of Care Review    Plan of Care Reviewed With  patient;daughter  -     Progress  no change  -RM     Outcome Summary  Pt willing gto participate with treatment. Pt difficult to keep awake. Pt c/o B thighs hurting. Pt did ambulate 12' but required mod a with rolling walker. Pt requires multiple verbal and tactile cues to maintain contact with rw as well as pt having a marked R latreral lean. Pt was left [facility] reclined with call light in hand and daughter at side.  -     Row Name 11/12/20 1238          Positioning and Restraints    Pre-Treatment Position  in bed  -RM     Post Treatment Position  chair  -RM     In Chair  notified nsg;reclined;call light within reach;encouraged to call for assist;exit alarm on;with family/caregiver  -       User Key  (r) = Recorded By, (t) = Taken By, (c) = Cosigned By    Initials Name Provider Type    Isael Mccallum, PABLO Physical Therapy Assistant         Outcome Measures     Row Name 11/12/20 1242          How much help from another person do you currently need...    Turning from your back to your side while in flat bed without using bedrails?  2  -RM     Moving from lying on back to sitting on the side of a flat bed without bedrails?  2  -RM     Moving to and from a bed to a chair (including a wheelchair)?  2  -RM     Standing up from a chair using your arms (e.g., wheelchair, bedside chair)?  2  -RM     Climbing 3-5 steps with a railing?  1  -RM     To walk in hospital room?  2  -RM     AM-PAC 6 Clicks Score (PT)  11  -RM     Row Name 11/12/20 1242          Functional Assessment    Outcome Measure Options  AM-PAC 6 Clicks Basic Mobility (PT)  -       User Key  (r) = Recorded By, (t) = Taken By, (c) = Cosigned By    Initials Name Provider Type     Isael Alcantar, PTA Physical Therapy Assistant        Physical Therapy Education                 Title: PT OT SLP Therapies (In Progress)     Topic: Physical Therapy (In Progress)     Point: Mobility training (Done)     Learning Progress Summary           Patient Acceptance, E,TB,D, VU,NR by  at 11/12/2020 1242    Comment: safety with mobility    Acceptance, E,TB,D, VU,NR by  at 11/11/2020 1441    Comment: exercise techniques    Acceptance, E,TB,D, VU,DU by  at 11/10/2020 1103    Comment: importance of increasing activity daily    Acceptance, E,TB, VU by CC at 11/8/2020 1324    Comment: importance of increasing time OOB daily    Acceptance, E,D, DU,VU by  at 11/7/2020 1037    Comment: Pt education for use of rolling walker for safe sit to stand and gait technique.   Family Acceptance, E,D, DU,VU by  at 11/7/2020 1037    Comment: Pt education for use of rolling walker for safe sit to stand and gait technique.                   Point: Home exercise program (Done)     Learning Progress Summary           Patient Acceptance, E,TB,D, VU,NR by  at 11/11/2020 1441    Comment: exercise techniques                    Point: Body mechanics (Not Started)     Learner Progress:  Not documented in this visit.                      User Key     Initials Effective Dates Name Provider Type Discipline    CC 03/07/18 -  Ping Mosquera, PTA Physical Therapy Assistant PT     03/07/18 -  Isael Alcantar, PABLO Physical Therapy Assistant PT     03/26/19 -  Manuela Otto, PT Physical Therapist PT              PT Recommendation and Plan     Plan of Care Reviewed With: patient, daughter  Progress: no change  Outcome Summary: Pt willing gto participate with treatment. Pt difficult to keep awake. Pt c/o B thighs hurting. Pt did ambulate 12' but required mod a with rolling walker. Pt requires multiple verbal and tactile cues to maintain contact with rw as well as pt having a marked R latreral lean. Pt was left c reclined with call light in hand and daughter at side.     Time Calculation:   PT Charges     Row Name 11/12/20 1243             Time Calculation    Start Time  1040  -RM      Stop Time  1107  -RM      Time Calculation (min)  27 min  -RM      PT Received On  11/12/20  -RM      PT Goal Re-Cert Due Date  11/17/20  -RM         Time Calculation- PT    Total Timed Code Minutes- PT  27 minute(s)  -RM         Timed Charges    55061 - Gait Training Minutes   17  -RM      26261 - PT Therapeutic Activity Minutes  10  -RM        User Key  (r) = Recorded By, (t) = Taken By, (c) = Cosigned By    Initials Name Provider Type     Isael Alcantar, PABLO Physical Therapy Assistant        Therapy Charges for Today     Code Description Service Date Service Provider Modifiers Qty    48856300761 HC PT THER PROC EA 15 MIN 11/11/2020 Isael Alcantar, PTA GP 1    09159892853 HC GAIT TRAINING EA 15 MIN 11/12/2020 Isael Alcantar, PTA GP 1    81956496735 HC PT THERAPEUTIC ACT EA 15 MIN 11/12/2020 Isael Alcantar, PTA GP 1          PT G-Codes  Outcome Measure Options: AM-PAC 6 Clicks Basic Mobility (PT)  AM-PAC 6 Clicks Score (PT): 11  AM-PAC 6  Clicks Score (OT): 15    Isael Alcantar, PTA  11/12/2020

## 2020-11-12 NOTE — PROGRESS NOTES
[facility]    Name:  Tunde Barrow   Age:  89 y.o.  Sex:  female  :  1931  MRN:  [id number]   Visit Number:  [id number]  Admission Date:  2020  Date Of Service:  20  Primary Care Physician:  Lorna Hagen MD     LOS: 8 days :  Patient Care Team:  Lorna Hagen MD as PCP - General (Internal Medicine)  Lorna Hagen MD as Referring Physician (Internal Medicine):    Chief Complaint:      Abdominal pain and fullness.    Subjective / Interval History:     Ms. Barrow was seen and examined this morning.  She states that she is feeling better with regards to her abdominal distention and states that she did pass gas.  She is complaining of left thigh and left knee joint pain.  She was able to stand up and walk a few steps around the bed with physical therapy this morning.  Patient was also seen by Dr. Martin and examined the patient with him at the bedside.  Patient denies any nausea or vomiting.    She was admitted on 2020 with small bowel obstruction and subsequently underwent exploratory laparotomy on 2020 with reduction of small bowel volvulus, lysis of adhesion band and small bowel resection.  She has had a prolonged postoperative ileus.  She did have NG tube aspiration until 11/10/2020 which she pulled out.  Prior to admission, she lived by herself.  She uses oxygen at night.  She states that her daughter lives close by and helps her out.  Repeat abdominal x-ray on 2020 showed persistent ileus but with stool in the rectum.  Patient was started on peripheral parenteral nutrition on 2020 and was started on physical therapy.    Review of Systems:     General ROS: Patient denies any fevers, chills or loss of consciousness.  Generalized weakness.  Respiratory ROS: Denies cough or shortness of breath.  Cardiovascular ROS: Denies chest pain or palpitations. No history of exertional chest  pain.  Gastrointestinal ROS: Abdominal pain, distention.  Denies any vomiting.  Neurological ROS: Denies any focal weakness. No loss of consciousness. Denies any numbness.  Dermatological ROS: Denies any redness or pruritis.    Vital Signs:    Temp:  [97.5 °F (36.4 °C)-98 °F (36.7 °C)] 97.6 °F (36.4 °C)  Heart Rate:  [73-90] 76  Resp:  [16-18] 16  BP: (124-132)/(67-83) 125/67    Intake and output:    I/O last 3 completed shifts:  In: -   Out: 400 [Urine:400]  No intake/output data recorded.    Physical Examination:    General Appearance:  Alert and cooperative, in mild distress due to abdominal fullness and pain.   Head:  Atraumatic and normocephalic, without obvious abnormality.   Eyes:          Conjunctivae and sclerae normal, no Icterus. No pallor. Extraocular movements are within normal limits.   Neck: Supple, trachea midline, no thyromegaly, no carotid bruit.   Lungs:   Chest shape is normal. Breath sounds heard bilaterally equally.  No wheezing.  Occasional basal crackles heard.  No pleural rub or bronchial breathing.   Heart:  Normal S1 and S2, no murmur, no gallop, no rub. No JVD   Abdomen:   Bowel sounds heard but decreased, no masses, no organomegaly. Soft, diffuse minimal tenderness present, minimal gaseous distention noted, no guarding, no rebound tenderness.  Midline surgical scar with staples noted healing well.   Extremities: Moves all extremities, no edema, no cyanosis, no clubbing.  Tenderness noted on the left lower thigh and knee joint areas without any erythema or swelling.   Skin: No bleeding or rash.   Neurologic: Awake, alert and oriented times 3. Moves all 4 extremities equally but does have generalized weakness.     Laboratory results:    Results from last 7 days   Lab Units 11/12/20  0603 11/11/20  0657 11/10/20  0556   SODIUM mmol/L 136 138 136   POTASSIUM mmol/L 3.6 3.4* 3.5   CHLORIDE mmol/L 105 103 105   CO2 mmol/L 23.0 22.0 22.0   BUN mg/dL 41* 38* 45*   CREATININE mg/dL 0.80 0.76  1.03*   CALCIUM mg/dL 9.8 9.9 10.3   BILIRUBIN mg/dL 1.0  --   --    ALK PHOS U/L 94  --   --    ALT (SGPT) U/L 15  --   --    AST (SGOT) U/L 21  --   --    GLUCOSE mg/dL 167* 177* 147*     Results from last 7 days   Lab Units 11/12/20  0603 11/11/20  0657 11/10/20  0556   WBC 10*3/mm3 14.03* 15.74* 12.39*   HEMOGLOBIN g/dL 12.2 12.3 12.6   HEMATOCRIT % 37.1 36.8 38.0   PLATELETS 10*3/mm3 192 214 214         Results from last 7 days   Lab Units 11/09/20  1038   TROPONIN T ng/mL <0.010               I have reviewed the patient's laboratory results.    Radiology results:    Imaging Results (Last 24 Hours)     Procedure Component Value Units Date/Time    XR Abdomen 2+ VW with Chest 1 VW [[id number]] Collected: 11/12/20 1307     Updated: 11/12/20 1309    Narrative:      ACUTE ABDOMINAL SERIES     INDICATION: Ileus. History of bowel obstruction.     FINDINGS: Flat and upright views of the abdomen plus frontal view chest  compared with 11/09/2020. EKG leads overlie the chest. There appear to  be small bilateral pleural effusions with increased atelectasis or  infiltrates at the lung bases. No pneumothorax. No free air identified.  Moderate gaseous distention of the stomach. Persistent gas distended  loops of small bowel diffusely. There is stool and bowel gas in the  rectum and colon. No other interval change.       Impression:      Persistent gaseous distention of small bowel loops  diffusely. Gas and stool is seen to the level of the rectum. Findings  are favored to represent ileus. Bowel obstruction less likely.     Probable worsening of opacities at the lung bases likely due to  combination of small effusions and atelectasis or infiltrates from  pneumonia. Mild fluid overload not excluded. Follow-up recommended.     This report was finalized on 11/12/2020 1:07 PM by Oswaldo Leon MD.        I have reviewed the patient's radiology reports.    Medication Review:     I have reviewed the patient's active and prn medications.      Problem List:      Small bowel obstruction (CMS/HCC)    Spinal stenosis, lumbar region, with neurogenic claudication    Atrial fibrillation (CMS/HCC)    Generalized anxiety disorder    GERD without esophagitis    Assessment:    1.  Acute small bowel obstruction secondary to small bowel volvulus and adhesions, POA.  2.  Status post exploratory laparotomy, small bowel resection and additional lysis on 11/5/2020.  3.  Prolonged postoperative ileus.  4.  Paroxysmal atrial fibrillation, anticoagulation is currently on hold.  5.  Essential hypertension.  6.  Generalized anxiety disorder.  7.  Bilateral lower lobe atelectasis.  8.  Osteoarthritis with the left knee joint pain.    Plan:    Ms. Barrow is currently hemodynamically stable.  But unfortunately has not had a bowel movement.  Patient subjectively feels better.  She has been started on peripheral parenteral nutrition since yesterday.  She was seen by Dr. aMrtin this morning and I have discussed the patient's condition with him.  We will start her on total parenteral nutrition from tomorrow with the PICC line if she does not have a bowel movement and continues to have ileus.    Patient does have pain in the left knee joint likely secondary to osteoarthritis and prolonged immobility since admission.  I do not suspect any fractures at this time.  We will get a pelvic x-ray to make sure she does not have any left hip problems that could be referred to the knee.    She will be continued on physical and Occupational Therapy.  Discussed with her daughter who is at the bedside.  Discussed with nursing staff and multidisciplinary team.    Florian Chandler MD  11/12/20  17:45 EST    Dictated utilizing Dragon dictation.

## 2020-11-12 NOTE — PROGRESS NOTES
Adult Nutrition  Assessment/PES    Patient Name:  Tunde Barrow  YOB: 1931  MRN: [id number]  Admit Date:  11/4/2020    Assessment Date:  11/12/2020    Comments:    RD received consult for TPN Recs.    Rec #1: Continue PPN Dextrose 10%, AA 4.25% @ 40 ml/hr.  PPN to provide 326 Dextrose kcal, 40.8 gms protein.    Rec #2: Give lipids, 250 mL, 20% to provide an additional 500 kcals daily.  Rec. #3: Give TPN Lytes.    Rec. #4: MVI daily and Trace elements Q3rd Day   Rec. #5: Labs: Mg, Ph, K+ with AM labs and TG Q3rd day.     RD to follow pt and adjust recommendations based on labs and nutrient needs. Consult RD PRN.      Reason for Assessment     Row Name 11/12/20 1134          Reason for Assessment    Reason For Assessment  TF/PN;follow-up protocol     Diagnosis  gastrointestinal disease;renal disease;cardiac disease SBO, AVI, Lactic acidosis, Asymptomatic bacteriuria, Afib, GERD, Small bowel resection     Identified At Risk by Screening Criteria  MST SCORE 2+;tube feeding or parenteral nutrition;difficulty chewing/swallowing             Labs/Tests/Procedures/Meds     Row Name 11/12/20 1135          Labs/Procedures/Meds    Lab Results Reviewed  reviewed, pertinent     Lab Results Comments  Low: Alb High: Gluc, BUN        Medications    Pertinent Medications Reviewed  reviewed, pertinent     Pertinent Medications Comments  Novolog, PPN         Physical Findings     Row Name 11/12/20 1137          Physical Findings    Skin  surgical incision Abdominal incision           Nutrition Prescription Ordered     Row Name 11/12/20 1141          Nutrition Prescription PO    Current PO Diet  NPO;Sips/ice chips Sips & Chips if only nauseated        Nutrition Prescription PN    PN Route  Peripheral     PN Goal Rate (mL/hr)  25 mL/hr     PN Current Rate (mL/hr)  25 mL/hr     PN Goal Volume (mL)  600 mL     PN Current Volume (mL)  600 mL     Dextrose Concentration (%)  10 %     Dextrose (Kcal)  204     Amino Acid  Concentration (%)  4.25%     Amino Acid (gm)  25.5     Lipid Concentration (%)  -- Held         Evaluation of Received Nutrient/Fluid Intake     Row Name 11/12/20 1143          PO Evaluation    Number of Days PO Intake Evaluated  Insufficient Data        PN Evaluation    Number of Days PN Evaluated  1 day     PN Average delivery (mL/day)   445 mL/day     PN Average lipid delivery (mL/day)   0 mL/day Held               Problem/Interventions:  Problem 1     Row Name 11/12/20 1148          Nutrition Diagnoses Problem 1    Problem 1  Inadequate Intake/Infusion     Inadequate Intake Type  Oral;Parenteral     Macronutrient  Kcal;Fat;Fluid;Fiber;Carbohydrate;Protein     Micronutrient  Vitamin;Mineral     Etiology (related to)  MNT for Treatment/Condition     Signs/Symptoms (evidenced by)  NPO     Resolved?  Yes PPN initiated; rate advancing to meet nutrient needs         Problem 2     Row Name 11/12/20 1150          Nutrition Diagnoses Problem 2    Problem 2  Impaired Nutrient Utilization     Etiology (related to)  Medical Diagnosis     Renal  AVI     Signs/Symptoms (evidenced by)  Biochemical     Specific Labs Noted  BUN         Problem 3     Row Name 11/12/20 1150          Nutrition Diagnoses Problem 3    Problem 3  Needs Alternate Route     Etiology (related to)  Medical Diagnosis     Gastrointestinal  SBO     Signs/Symptoms (evidenced by)  Report/Observation     Reported/Observed By  MD           Intervention Goal     Row Name 11/12/20 1151          Intervention Goal    General  Meet nutritional needs for age/condition;Improved nutrition related lab(s);Nutrition support treatment     PO  Other (comment) Continue NPO while pt receives PPN     TF/PN  Adjust TF/PN     Transition  PN to PO     Weight  Maintain weight         Nutrition Intervention     Row Name 11/12/20 1152          Nutrition Intervention    RD/Tech Action  Follow Tx progress;Recommend/ordered     Recommended/Ordered  PN         Nutrition Prescription      Row Name 11/12/20 1152          Nutrition Prescription PO    PO Prescription  Other (comment) Continue NPO diet order while pt receives PPN     New PO Prescription Ordered?  No, recommended        Nutrition Prescription PN    Parenteral Prescription  PN begin/change     PN Route  Peripheral     Dextrose Concentration (%)  10 %     Dextrose (Kcal)  326.4     Amino Acid Concentration (%)  4.25%     Amino Acid (gm)  40.8     PN Goal Rate (mL/hr)  40 mL/hr     PN Starting Rate (mL/hr)  40 mL/hr     PN Goal Volume (mL)  960 mL     PN Starting Volume (mL)  960 mL     Lipid Concentration (%)  20%     Lipid Volume (mL)  250 mL     Lipid Frequency  Daily     New PN Prescription Ordered?  Yes        Other Orders    Obtain Weight  Daily     Obtain Weight Ordered?  No, recommended     Supplement  Vitamin mineral supplement     Supplement Ordered?  No, recommended     Labs  Mg++;Phos;K+     Labs Ordered?  No, recommended         Education/Evaluation     Row Name 11/12/20 1200          Education    Education  Will Instruct as appropriate        Monitor/Evaluation    Monitor  Per protocol;I&O;Pertinent labs;PN delivery/tolerance;Weight;Skin status           Electronically signed by:  Hanny Solis RD  11/12/20 12:06 EST

## 2020-11-12 NOTE — PLAN OF CARE
"  Problem: Adult Inpatient Plan of Care  Goal: Plan of Care Review  Recent Flowsheet Documentation  Taken 11/12/2020 1351 by Isha Weiner OT  Progress: no change  Plan of Care Reviewed With:   patient   daughter  Outcome Summary: OT tx completed. Patient c/o increased L knee pain requiring mod A for sit to stand. Patient tolerated B UE AAROM, but refused further functional activity d/t pain and fatigue. Notified RN of L knee pain and that during tx session patient tells therapist and daughter \"I feel like I'm dying.\" Continue OT POC     "

## 2020-11-12 NOTE — PROGRESS NOTES
LOS: 8 days   Patient Care Team:  Lorna Hagen MD as PCP - General (Internal Medicine)  Lorna Hagen MD as Referring Physician (Internal Medicine)      Chief Complaint: passing flatus, feels very weak      Interval History:     Patient Complaints: See Above, passing flatus    History taken from: patient RN    Vital Signs  Temp:  [97.5 °F (36.4 °C)-98 °F (36.7 °C)] 97.6 °F (36.4 °C)  Heart Rate:  [73-90] 76  Resp:  [16-20] 16  BP: (124-132)/(67-87) 125/67    Physical Exam:     General Appearance:    Alert, cooperative, in no acute distress   Head:    Normocephalic, without obvious abnormality, atraumatic   Lungs:     Clear to auscultation,respirations regular, even and                  unlabored    Heart:    Regular rhythm and normal rate, normal S1 and S2, no            murmur, no gallop, no rub, no click   Abdomen:    Wound is clean and dry, she is slightly distended but no peritoneal signs   Extremities:   Moves all extremities well, no edema, no cyanosis, no             redness   Pulses:   Pulses palpable and equal bilaterally   Skin:   No bleeding, bruising or rash        Results Review:       Lab Results (last 24 hours)     Procedure Component Value Units Date/Time    POC Glucose Once [[id number]]  (Abnormal) Collected: 11/12/20 1108    Specimen: Blood Updated: 11/12/20 1149     Glucose 140 mg/dL      Comment: Serial Number: [id number]:  [id number]       Comprehensive Metabolic Panel [[id number]]  (Abnormal) Collected: 11/12/20 0603    Specimen: Blood Updated: 11/12/20 0651     Glucose 167 mg/dL      BUN 41 mg/dL      Creatinine 0.80 mg/dL      Sodium 136 mmol/L      Potassium 3.6 mmol/L      Chloride 105 mmol/L      CO2 23.0 mmol/L      Calcium 9.8 mg/dL      Total Protein 5.5 g/dL      Albumin 3.00 g/dL      ALT (SGPT) 15 U/L      AST (SGOT) 21 U/L      Alkaline Phosphatase 94 U/L      Total Bilirubin 1.0 mg/dL      eGFR Non African Amer 68 mL/min/1.73      Globulin 2.5 gm/dL       A/G Ratio 1.2 g/dL      BUN/Creatinine Ratio 51.3     Anion Gap 8.0 mmol/L     Narrative:      GFR Normal >60  Chronic Kidney Disease <60  Kidney Failure <15      Phosphorus [[id number]]  (Normal) Collected: 11/12/20 0603    Specimen: Blood Updated: 11/12/20 0651     Phosphorus 2.8 mg/dL     Triglycerides [[id number]]  (Normal) Collected: 11/12/20 0603    Specimen: Blood Updated: 11/12/20 0651     Triglycerides 84 mg/dL     Magnesium [[id number]]  (Normal) Collected: 11/12/20 0603    Specimen: Blood Updated: 11/12/20 0651     Magnesium 1.7 mg/dL     POC Glucose Once [[id number]]  (Abnormal) Collected: 11/12/20 0644    Specimen: Blood Updated: 11/12/20 0646     Glucose 137 mg/dL      Comment: Serial Number: [id number]:  [id number]       CBC (No Diff) [[id number]]  (Abnormal) Collected: 11/12/20 0603    Specimen: Blood Updated: 11/12/20 0631     WBC 14.03 10*3/mm3      RBC 4.01 10*6/mm3      Hemoglobin 12.2 g/dL      Hematocrit 37.1 %      MCV 92.5 fL      MCH 30.4 pg      MCHC 32.9 g/dL      RDW 13.2 %      RDW-SD 44.7 fl      MPV 10.9 fL      Platelets 192 10*3/mm3     POC Glucose Once [[id number]]  (Abnormal) Collected: 11/11/20 2019    Specimen: Blood Updated: 11/11/20 2049     Glucose 152 mg/dL      Comment: Serial Number: [id number]:  [id number]       POC Glucose Once [[id number]]  (Abnormal) Collected: 11/11/20 1653    Specimen: Blood Updated: 11/11/20 1701     Glucose 164 mg/dL      Comment: Serial Number: [id number]:  [id number]                 Assessment/Plan       Small bowel obstruction (CMS/HCC)    Spinal stenosis, lumbar region, with neurogenic claudication    Atrial fibrillation (CMS/HCC)    Generalized anxiety disorder    GERD without esophagitis      I did review the patient's chest x-ray that shows worsening bilateral basilar infiltrates and her abdominal films that show improving ileus now she has air down into the rectum which is a good sign.    Her main problem is extreme weakness and she  is not able to ambulate.  She is able to get up to a chair.    I had a detailed discussion with the patient's daughter Oralia at [phone].  The patient is extremely weak and her daughter understands this, she understands that we are given her nutrition via her vein and she can take as much as she needs orally but definitely I think the patient's extreme weakness is inhibiting her recovery.  She has worsening bibasilar infiltrates which is not surprising given the lack of ability to take deep breaths and ambulate.  The daughter understands, agrees, and had no questions for me at the end of the conversation.    I have discussed the situation with the nursing staff and the hospitalist.      Umberto Martin MD  11/12/20  14:46 EST

## 2020-11-12 NOTE — PLAN OF CARE
Problem: Adult Inpatient Plan of Care  Goal: Plan of Care Review  Recent Flowsheet Documentation  Taken 11/12/2020 1238 by Isael Alcantar, PTA  Progress: no change  Plan of Care Reviewed With:   patient   daughter  Outcome Summary: Pt willing gto participate with treatment. Pt difficult to keep awake. Pt c/o B thighs hurting. Pt did ambulate 12' but required mod a with rolling walker. Pt requires multiple verbal and tactile cues to maintain contact with rw as well as pt having a marked R latreral lean. Pt was left uic reclined with call light in hand and daughter at side.

## 2020-11-12 NOTE — PROGRESS NOTES
LOS: 8 days   Patient Care Team:  Lorna Hagen MD as PCP - General (Internal Medicine)  Lorna Hagen MD as Referring Physician (Internal Medicine)      Chief Complaint: up in chair, still weak but feeling better      Interval History:     Patient Complaints: See Above, passing flatus    History taken from: patient RN    Vital Signs  Temp:  [97.5 °F (36.4 °C)-98.8 °F (37.1 °C)] 97.5 °F (36.4 °C)  Heart Rate:  [73-90] 73  Resp:  [16-20] 16  BP: (123-132)/(54-87) 130/77    Physical Exam:     General Appearance:    Alert, cooperative, in no acute distress   Head:    Normocephalic, without obvious abnormality, atraumatic   Lungs:     Clear to auscultation,respirations regular, even and                  unlabored    Heart:    Regular rhythm and normal rate, normal S1 and S2, no            murmur, no gallop, no rub, no click   Abdomen:     Normal bowel sounds, no masses, no organomegaly, soft        non-tender, non-distended, no guarding, no rebound                tenderness   Extremities:   Moves all extremities well, no edema, no cyanosis, no             redness   Pulses:   Pulses palpable and equal bilaterally   Skin:   No bleeding, bruising or rash        Results Review:       Lab Results (last 24 hours)     Procedure Component Value Units Date/Time    Comprehensive Metabolic Panel [[id number]]  (Abnormal) Collected: 11/12/20 0603    Specimen: Blood Updated: 11/12/20 0651     Glucose 167 mg/dL      BUN 41 mg/dL      Creatinine 0.80 mg/dL      Sodium 136 mmol/L      Potassium 3.6 mmol/L      Chloride 105 mmol/L      CO2 23.0 mmol/L      Calcium 9.8 mg/dL      Total Protein 5.5 g/dL      Albumin 3.00 g/dL      ALT (SGPT) 15 U/L      AST (SGOT) 21 U/L      Alkaline Phosphatase 94 U/L      Total Bilirubin 1.0 mg/dL      eGFR Non African Amer 68 mL/min/1.73      Globulin 2.5 gm/dL      A/G Ratio 1.2 g/dL      BUN/Creatinine Ratio 51.3     Anion Gap 8.0 mmol/L     Narrative:      GFR Normal  >60  Chronic Kidney Disease <60  Kidney Failure <15      Phosphorus [[id number]]  (Normal) Collected: 11/12/20 0603    Specimen: Blood Updated: 11/12/20 0651     Phosphorus 2.8 mg/dL     Triglycerides [[id number]]  (Normal) Collected: 11/12/20 0603    Specimen: Blood Updated: 11/12/20 0651     Triglycerides 84 mg/dL     Magnesium [[id number]]  (Normal) Collected: 11/12/20 0603    Specimen: Blood Updated: 11/12/20 0651     Magnesium 1.7 mg/dL     POC Glucose Once [[id number]]  (Abnormal) Collected: 11/12/20 0644    Specimen: Blood Updated: 11/12/20 0646     Glucose 137 mg/dL      Comment: Serial Number: [id number]:  [id number]       CBC (No Diff) [[id number]]  (Abnormal) Collected: 11/12/20 0603    Specimen: Blood Updated: 11/12/20 0631     WBC 14.03 10*3/mm3      RBC 4.01 10*6/mm3      Hemoglobin 12.2 g/dL      Hematocrit 37.1 %      MCV 92.5 fL      MCH 30.4 pg      MCHC 32.9 g/dL      RDW 13.2 %      RDW-SD 44.7 fl      MPV 10.9 fL      Platelets 192 10*3/mm3     POC Glucose Once [[id number]]  (Abnormal) Collected: 11/11/20 2019    Specimen: Blood Updated: 11/11/20 2049     Glucose 152 mg/dL      Comment: Serial Number: [id number]:  [id number]       POC Glucose Once [[id number]]  (Abnormal) Collected: 11/11/20 1653    Specimen: Blood Updated: 11/11/20 1701     Glucose 164 mg/dL      Comment: Serial Number: [id number]:  [id number]       POC Glucose Once [[id number]]  (Abnormal) Collected: 11/11/20 1046    Specimen: Blood Updated: 11/11/20 1049     Glucose 144 mg/dL      Comment: Serial Number: [id number]:  [id number]                 Assessment/Plan       Small bowel obstruction (CMS/HCC)    Spinal stenosis, lumbar region, with neurogenic claudication    Atrial fibrillation (CMS/HCC)    Generalized anxiety disorder    GERD without esophagitis      Stable white female 6 days s/p exp lap with small bowel resection for acute small bowel obstruction and volvulus.  I did have a detailed discussion today at the  bedside with the patient's brother and with the patient.  I did discuss the situation with the Hospitalist and the nurse about the patient.    Umberto Martin MD  11/12/20  08:54 EST

## 2020-11-12 NOTE — THERAPY TREATMENT NOTE
"Patient Name: Tunde Barrow  : 1931    MRN: [id number]                              Today's Date: 2020       Admit Date: 2020    Visit Dx:     ICD-10-CM ICD-9-CM   1. Small bowel obstruction (CMS/HCC)  K56.609 560.9   2. Renal insufficiency  N28.9 593.9   3. Lactic acidosis  E87.2 276.2   4. Bowel obstruction (CMS/HCC)  K56.609 560.9     Patient Active Problem List   Diagnosis   • Spinal stenosis, lumbar region, with neurogenic claudication   • Small bowel obstruction (CMS/HCC)   • Atrial fibrillation (CMS/HCC)   • Generalized anxiety disorder   • GERD without esophagitis     Past Medical History:   Diagnosis Date   • A-fib (CMS/HCC)    • Anxiety and depression    • Arthritis    • Body piercing     BOTH EARS   • Cataract, bilateral    • Chronic back pain    • Diverticulitis    • GERD (gastroesophageal reflux disease)    • History of nuclear stress test 2017   • [place] (hard of hearing)     SLIGHT[place]-NO HEARING AIDS   • Oxygen dependent     AT NIGHT.  UNSURE HOW MUCH SHE IS ON.    • Wears dentures     UPPER PLATE ONLY     Past Surgical History:   Procedure Laterality Date   • APPENDECTOMY     • BREAST SURGERY Right     FIBROID TUMORS X 3    • CARDIAC CATHETERIZATION      STATED \"IT WAS QUITE A WHILE AGO.  I DIDN'T NEED STENTS\".     • CATARACT EXTRACTION W/ INTRAOCULAR LENS IMPLANT Right 2019    Procedure: CATARACT PHACO EXTRACTION WITH INTRAOCULAR LENS IMPLANT RIGHT;  Surgeon: Cindy Olsen MD;  Location: [facility];  Service: Ophthalmology   • CATARACT EXTRACTION W/ INTRAOCULAR LENS IMPLANT Left 2019    Procedure: CATARACT PHACO EXTRACTION WITH INTRAOCULAR LENS IMPLANT LEFT EYE;  Surgeon: Cindy Olsen MD;  Location: [facility] OR;  Service: Ophthalmology   • CHOLECYSTECTOMY     • COLONOSCOPY     • ENDOSCOPY     • EXPLORATORY LAPAROTOMY N/A 2020    Procedure: LAPAROTOMY EXPLORATORY WITH SMALL BOWEL RESECTION;  Surgeon: Umberto Martin MD;  Location: [facility] OR;  Service: General;  " Laterality: N/A;   • HYSTERECTOMY     • LAPAROSCOPIC TUBAL LIGATION     • TONSILLECTOMY       General Information     Row Name 11/12/20 1348          OT Time and Intention    Document Type  therapy note (daily note)  -SD     Mode of Treatment  occupational therapy  -SD       User Key  (r) = Recorded By, (t) = Taken By, (c) = Cosigned By    Initials Name Provider Type    Isha Arevalo OT Occupational Therapist        Mobility/ADL's     Row Name 11/12/20 1348          Transfers    Transfers  sit-stand transfer  -SD     Comment (Transfers)  Patient c/o increased L knee pain, limiting ability to stand; notified RN  -SD     Sit-Stand [place] (Transfers)  moderate assist (50% patient effort);verbal cues  -SD     Row Name 11/12/20 1348          Sit-Stand Transfer    Assistive Device (Sit-Stand Transfers)  walker, front-wheeled  -SD     Row Name 11/12/20 1348          Grooming Assessment/Training    [place] Level (Grooming)  hair care, combing/brushing;minimum assist (75% patient effort)  -SD       User Key  (r) = Recorded By, (t) = Taken By, (c) = Cosigned By    Initials Name Provider Type    Isha Arevalo OT Occupational Therapist        Obj/Interventions     Row Name 11/12/20 1349          Shoulder (Therapeutic Exercise)    Shoulder (Therapeutic Exercise)  AAROM (active assistive range of motion)  -SD     Shoulder AAROM (Therapeutic Exercise)  bilateral;flexion;extension;aBduction;aDduction;10 repetitions  -SD     Row Name 11/12/20 1349          Elbow/Forearm (Therapeutic Exercise)    Elbow/Forearm (Therapeutic Exercise)  AAROM (active assistive range of motion)  -SD     Elbow/Forearm AAROM (Therapeutic Exercise)  bilateral;flexion;extension;supination;pronation;10 repetitions  -SD     Row Name 11/12/20 1349          Therapeutic Exercise    Therapeutic Exercise  shoulder;elbow/forearm  -SD       User Key  (r) = Recorded By, (t) = Taken By, (c) = Cosigned By    Initials Name Provider Type    SD  "Isha Weiner OT Occupational Therapist        Goals/Plan    No documentation.       Clinical Impression     Row Name 11/12/20 1350          Pain Assessment    Additional Documentation  Pain Scale: FACES Pre/Post-Treatment (Group)  -SD     Row Name 11/12/20 1351          Pain Scale: Numbers Pre/Post-Treatment    Pain Intervention(s)  Repositioned  -SD     Row Name 11/12/20 1358          Pain Scale: FACES Pre/Post-Treatment    Pain: FACES Scale, Pretreatment  8-->hurts whole lot  -SD     Posttreatment Pain Rating  8-->hurts whole lot  -SD     Pain Location - Side  Left  -SD     Pain Location - Orientation  lower  -SD     Pain Location  knee  -SD     Row Name 11/12/20 1352          Plan of Care Review    Plan of Care Reviewed With  patient;daughter  -SD     Progress  no change  -SD     Outcome Summary  OT tx completed. Patient c/o increased L knee pain requiring mod A for sit to stand. Patient tolerated B UE AAROM, but refused further functional activity d/t pain and fatigue. Notified RN of L knee pain and that during tx session patient tells therapist and daughter \"I feel like I'm dying.\" Continue OT POC  -SD       User Key  (r) = Recorded By, (t) = Taken By, (c) = Cosigned By    Initials Name Provider Type    SD Isha Weiner OT Occupational Therapist        Outcome Measures     Row Name 11/12/20 0762          How much help from another is currently needed...    Putting on and taking off regular lower body clothing?  2  -SD     Bathing (including washing, rinsing, and drying)  2  -SD     Toileting (which includes using toilet bed pan or urinal)  2  -SD     Putting on and taking off regular upper body clothing  2  -SD     Taking care of personal grooming (such as brushing teeth)  3  -SD     Eating meals  3  -SD     AM-PAC 6 Clicks Score (OT)  14  -SD     Row Name 11/12/20 0780          Functional Assessment    Outcome Measure Options  AM-PAC 6 Clicks Daily Activity (OT)  -SD       User Key  (r) = Recorded By, " (t) = Taken By, (c) = Cosigned By    Initials Name Provider Type    SD Isha Weiner OT Occupational Therapist        Occupational Therapy Education                 Title: PT OT SLP Therapies (In Progress)     Topic: Occupational Therapy (In Progress)     Point: ADL training (Done)     Description:   Instruct learner(s) on proper safety adaptation and remediation techniques during self care or transfers.   Instruct in proper use of assistive devices.              Learning Progress Summary           Patient Acceptance, E,TB, VU by SD at 11/11/2020 1301    Comment: Safety and sequencing during functional transfers and mobility, importance of prgressing activity level.    Acceptance, E,TB, VU by SD at 11/10/2020 1207    Comment: Benefit of OT; OT POC                   Point: Home exercise program (Done)     Description:   Instruct learner(s) on appropriate technique for monitoring, assisting and/or progressing therapeutic exercises/activities.              Learning Progress Summary           Patient Acceptance, E,TB, VU by SD at 11/12/2020 1355    Comment: Importance of progressing activity.   Family Acceptance, E,TB, VU by SD at 11/12/2020 1355    Comment: Importance of progressing activity.                   Point: Precautions (Not Started)     Description:   Instruct learner(s) on prescribed precautions during self-care and functional transfers.              Learner Progress:  Not documented in this visit.          Point: Body mechanics (Not Started)     Description:   Instruct learner(s) on proper positioning and spine alignment during self-care, functional mobility activities and/or exercises.              Learner Progress:  Not documented in this visit.                      User Key     Initials Effective Dates Name Provider Type Discipline    SD 03/07/18 -  Isha Weiner OT Occupational Therapist OT              OT Recommendation and Plan  Planned Therapy Interventions (OT): activity tolerance training,  "adaptive equipment training, BADL retraining, patient/caregiver education/training, strengthening exercise, transfer/mobility retraining  Therapy Frequency (OT): 3 times/wk(5 times if indicated)  Plan of Care Review  Plan of Care Reviewed With: patient, daughter  Progress: no change  Outcome Summary: OT tx completed. Patient c/o increased L knee pain requiring mod A for sit to stand. Patient tolerated B UE AAROM, but refused further functional activity d/t pain and fatigue. Notified RN of L knee pain and that during tx session patient tells therapist and daughter \"I feel like I'm dying.\" Continue OT POC     Time Calculation:   Time Calculation- OT     Row Name 11/12/20 1357 11/12/20 1243          Time Calculation- OT    OT Start Time  1249  -SD  --     OT Stop Time  1307  -SD  --     OT Time Calculation (min)  18 min  -SD  --        Timed Charges    44344 - OT Therapeutic Exercise Minutes  12  -SD  --     47706 - Gait Training Minutes   --  17  -RM     94379 - OT Therapeutic Activity Minutes  6  -SD  --       User Key  (r) = Recorded By, (t) = Taken By, (c) = Cosigned By    Initials Name Provider Type    Isael Mccallum, PTA Physical Therapy Assistant    SD Isha Weiner OT Occupational Therapist        Therapy Charges for Today     Code Description Service Date Service Provider Modifiers Qty    84555874890 HC OT THER PROC EA 15 MIN 11/11/2020 Isha Weiner OT GO 1    59771126406 HC OT THER PROC EA 15 MIN 11/12/2020 Isha Weiner OT GO 1               Isha Weiner OT  11/12/2020  "

## 2020-11-12 NOTE — PLAN OF CARE
Goal Outcome Evaluation:  Plan of Care Reviewed With: patient, family  Progress: no change   Tunde remains NPO. A+OX4. Oral care provided. Up in chair with 2 assist. Pain controlled per MAR. Family at bedside. VSS. Passing flatus but no BM

## 2020-11-12 NOTE — PROGRESS NOTES
Discharge Planning Assessment   Jayesh     Patient Name: Tunde Barrow  MRN: [id number]  Today's Date: 11/12/2020    Admit Date: 11/4/2020    Discharge Needs Assessment    No documentation.       Discharge Plan     Row Name 11/12/20 1334       Plan    Plan Comments  not medically ready for discharge        Continued Care and Services - Admitted Since 11/4/2020    Coordination has not been started for this encounter.       Expected Discharge Date and Time     Expected Discharge Date Expected Discharge Time    Nov 16, 2020         Demographic Summary    No documentation.       Functional Status    No documentation.       Psychosocial    No documentation.       Abuse/Neglect    No documentation.       Legal    No documentation.       Substance Abuse    No documentation.       Patient Forms    No documentation.           Carina Ramires RN

## 2020-11-13 NOTE — PROGRESS NOTES
[facility]    Name:  Tunde Barrow   Age:  89 y.o.  Sex:  female  :  1931  MRN:  [id number]   Visit Number:  [id number]  Admission Date:  2020  Date Of Service:  20  Primary Care Physician:  Lorna Hagen MD     LOS: 9 days :  Patient Care Team:  Lorna Hagen MD as PCP - General (Internal Medicine)  Lorna Hagen MD as Referring Physician (Internal Medicine):    Chief Complaint:      Generalized weakness.    Subjective / Interval History:     Ms. Barrow was seen and examined this morning.  She states that she is feeling better with regards to her abdominal distention and states that she did have a bowel movement yesterday.  This was confirmed by the daughter who is at the bedside.  According to the nurse assistant, she in fact had a couple of bowel movements yesterday.  Patient did work with physical therapy but does seem to have significant pain on the left lower extremity and requiring maximum assist.  Pelvic x-ray done yesterday showed left superior pubic ramus fracture but there was no left hip fracture.  Patient denies any nausea or vomiting.  But does have poor appetite.    She was admitted on 2020 with small bowel obstruction and subsequently underwent exploratory laparotomy on 2020 with reduction of small bowel volvulus, lysis of adhesion band and small bowel resection.  She has had a prolonged postoperative ileus.  She did have NG tube aspiration until 11/10/2020 which she pulled out.  Prior to admission, she lived by herself.  She uses oxygen at night.  She states that her daughter lives close by and helps her out.  Repeat abdominal x-ray on 2020 showed persistent ileus but with stool in the rectum.  Patient was started on peripheral parenteral nutrition on 2020 and was started on physical therapy.  A PICC line was placed on 2020 and she was started on total parenteral  nutrition.  Patient was complaining of left knee joint pain when physical therapy evaluated her.  Left knee joint was not swollen or inflamed.  A pelvic x-ray done on 11/12/2020 did show left superior pubic ramus fracture.  Patient did not have any history of fall in the hospital but could have fallen prior to admission.  A orthopedic consult was ordered from Dr. Tse on 11/14/2020.    Review of Systems:     General ROS: Patient denies any fevers, chills or loss of consciousness.  Generalized weakness.  Respiratory ROS: Denies cough or shortness of breath.  Cardiovascular ROS: Denies chest pain or palpitations. No history of exertional chest pain.  Gastrointestinal ROS: Abdominal pain, distention.  Denies any vomiting.  Neurological ROS: Denies any focal weakness. No loss of consciousness. Denies any numbness.  Dermatological ROS: Denies any redness or pruritis.    Vital Signs:    Temp:  [96.7 °F (35.9 °C)-98.9 °F (37.2 °C)] 96.7 °F (35.9 °C)  Heart Rate:  [] 96  Resp:  [14-18] 14  BP: (104-141)/(64-73) 119/71    Intake and output:    I/O last 3 completed shifts:  In: -   Out: 900 [Urine:900]  I/O this shift:  In: -   Out: 150 [Urine:150]    Physical Examination:    General Appearance:  Alert and cooperative, in mild distress due to abdominal fullness and pain.   Head:  Atraumatic and normocephalic, without obvious abnormality.   Eyes:          Conjunctivae and sclerae normal, no Icterus. No pallor. Extraocular movements are within normal limits.   Neck: Supple, trachea midline, no thyromegaly, no carotid bruit.   Lungs:   Chest shape is normal. Breath sounds heard bilaterally equally.  No wheezing.  Occasional basal crackles heard.  No pleural rub or bronchial breathing.   Heart:  Normal S1 and S2, no murmur, no gallop, no rub. No JVD   Abdomen:   Bowel sounds heard but decreased, no masses, no organomegaly. Soft, diffuse minimal tenderness present, minimal gaseous distention noted, no guarding, no rebound  tenderness.  Midline surgical scar with staples noted healing well.   Extremities: Moves all extremities, no edema, no cyanosis, no clubbing.  Tenderness noted on the left lower thigh and knee joint areas without any erythema or swelling.  Right arm PICC line noted.   Skin: No bleeding or rash.   Neurologic: Awake, alert and oriented times 3. Moves all 4 extremities equally but does have generalized weakness.     Laboratory results:    Results from last 7 days   Lab Units 11/13/20  0623 11/12/20  0603 11/11/20  0657   SODIUM mmol/L 135* 136 138   POTASSIUM mmol/L 3.9 3.6 3.4*   CHLORIDE mmol/L 104 105 103   CO2 mmol/L 20.6* 23.0 22.0   BUN mg/dL 44* 41* 38*   CREATININE mg/dL 0.87 0.80 0.76   CALCIUM mg/dL 9.9 9.8 9.9   BILIRUBIN mg/dL 0.7 1.0  --    ALK PHOS U/L 109 94  --    ALT (SGPT) U/L 14 15  --    AST (SGOT) U/L 19 21  --    GLUCOSE mg/dL 160* 167* 177*     Results from last 7 days   Lab Units 11/12/20  0603 11/11/20  0657 11/10/20  0556   WBC 10*3/mm3 14.03* 15.74* 12.39*   HEMOGLOBIN g/dL 12.2 12.3 12.6   HEMATOCRIT % 37.1 36.8 38.0   PLATELETS 10*3/mm3 192 214 214         Results from last 7 days   Lab Units 11/09/20  1038   TROPONIN T ng/mL <0.010               I have reviewed the patient's laboratory results.    Radiology results:    Imaging Results (Last 24 Hours)     Procedure Component Value Units Date/Time    XR Chest 1 View [[id number]] Collected: 11/13/20 1045     Updated: 11/13/20 1051    Narrative:      Portable chest     INDICATION: PICC line placement.     FINDINGS: Single frontal portable chest. Comparison with 11/12/2020. EKG  leads overlie the chest. Right-sided PICC line terminates in the distal  superior vena cava. No pneumothorax. Findings are again consistent with  bilateral effusions and infiltrates and/or atelectasis. Fluid overload  should be considered. These findings are slightly improved.       Impression:      1. Interval PICC line placement.  2. Slight improvement in aeration.  Continued follow up recommended.     This report was finalized on 11/13/2020 10:49 AM by Oswaldo Leon MD.    XR Pelvis 1 or 2 View [[id number]] Collected: 11/12/20 2302     Updated: 11/12/20 2303    Narrative:      FINAL REPORT    TECHNIQUE:  2 views of the pelvis were obtained.    CLINICAL HISTORY:  . Left hip pain    COMPARISON:  CT abdomen and pelvis 11/4/2020    FINDINGS:  Again seen are postoperative changes of right hip replacement.  The orthopedic hardware is intact and normally articulated.  There is a subtle fracture involving the lateral aspect of the  left superior pubic ramus.  This is new when compared to the  prior examination.  The left hip is normal.  No other fracture  of the pelvis is identified.  Dilated loops of small bowel are  seen in the lower abdomen and pelvis.      Impression:      Fracture of the left superior pubic ramus.  No other acute  abnormality.  Small bowel dilation.    Authenticated by Andrea Arvizu M.D. on 11/12/2020 11:02:03 PM        I have reviewed the patient's radiology reports.    Medication Review:     I have reviewed the patient's active and prn medications.     Problem List:      Small bowel obstruction (CMS/HCC)    Spinal stenosis, lumbar region, with neurogenic claudication    Atrial fibrillation (CMS/HCC)    Generalized anxiety disorder    GERD without esophagitis    Assessment:    1.  Acute small bowel obstruction secondary to small bowel volvulus and adhesions, POA.  2.  Status post exploratory laparotomy, small bowel resection and additional lysis on 11/5/2020.  3.  Prolonged postoperative ileus.  4.  Paroxysmal atrial fibrillation, anticoagulation is currently on hold.  5.  Essential hypertension.  6.  Generalized anxiety disorder.  7.  Bilateral lower lobe atelectasis.  8.  Osteoarthritis with the left knee joint pain.    Plan:    Ms. Barrow is currently hemodynamically stable but continues to have generalized weakness and poor appetite.  Fortunately her bowel  function is slowly returning back.  However due to her poor appetite, poor oral intake and generalized weakness, we will place a PICC line and started on TPN.    Patient was seen by Dr. Black today and he is planning to advance her diet to clear liquids.  She will be continued on physical and occupational therapy.    Patient does have left superior pubic ramus fracture.  The exact duration of this fracture is uncertain but is causing her pain on walking.  I have discussed the patient's condition and x-ray findings with Dr. Tse from orthopedic services and he will see the patient tomorrow.  I have discussed the patient's condition with her daughter who is at the bedside.  Discussed with nursing staff and multidisciplinary team.  Further recommendations and discharge plan depend upon her clinical course.  She will benefit from going to short-term rehabilitation.    Florian Chandler MD  11/13/20  17:03 EST    Dictated utilizing Dragon dictation.

## 2020-11-13 NOTE — PROGRESS NOTES
LOS: 9 days   Patient Care Team:  Lorna Hagen MD as PCP - General (Internal Medicine)  Lorna Hagen MD as Referring Physician (Internal Medicine)           HPI: I saw the patient this afternoon.  Daughter is present as well.  Patient without obvious complaints but she is somewhat difficult to arouse from her sleep.    ROS:    Vital Signs  Temp:  [97.6 °F (36.4 °C)-98.9 °F (37.2 °C)] 98 °F (36.7 °C)  Heart Rate:  [] 102  Resp:  [16-18] 18  BP: (104-141)/(64-73) 130/71    Physical Exam:     General Appearance:   Patient is arousable but readily falls back to sleep.  No obvious complaints.  Certainly in no acute distress.   Cardiovascular/Heart:  Normal S1 and S2,    GI/Abdomen:    Soft with moderate distention.  No significant tenderness.  No evidence of wound care infection.                 Results Review:       Lab Results (last 24 hours)     Procedure Component Value Units Date/Time    Comprehensive Metabolic Panel [[id number]]  (Abnormal) Collected: 11/13/20 0623    Specimen: Blood Updated: 11/13/20 1305     Glucose 160 mg/dL      BUN 44 mg/dL      Creatinine 0.87 mg/dL      Sodium 135 mmol/L      Potassium 3.9 mmol/L      Chloride 104 mmol/L      CO2 20.6 mmol/L      Calcium 9.9 mg/dL      Total Protein 5.3 g/dL      Albumin 2.80 g/dL      ALT (SGPT) 14 U/L      AST (SGOT) 19 U/L      Alkaline Phosphatase 109 U/L      Total Bilirubin 0.7 mg/dL      eGFR Non African Amer 61 mL/min/1.73      Globulin 2.5 gm/dL      A/G Ratio 1.1 g/dL      BUN/Creatinine Ratio 50.6     Anion Gap 10.4 mmol/L     Narrative:      GFR Normal >60  Chronic Kidney Disease <60  Kidney Failure <15      Magnesium [[id number]]  (Normal) Collected: 11/13/20 0623    Specimen: Blood Updated: 11/13/20 1305     Magnesium 1.7 mg/dL     POC Glucose Once [[id number]]  (Abnormal) Collected: 11/13/20 1135    Specimen: Blood Updated: 11/13/20 1143     Glucose 164 mg/dL      Comment: Serial Number: [id number]:   [id number]       Phosphorus [[id number]]  (Normal) Collected: 11/13/20 0623    Specimen: Blood Updated: 11/13/20 0650     Phosphorus 3.2 mg/dL     POC Glucose Once [[id number]]  (Abnormal) Collected: 11/13/20 0622    Specimen: Blood Updated: 11/13/20 0641     Glucose 147 mg/dL      Comment: Serial Number: [id number]:  [id number]       POC Glucose Once [[id number]]  (Abnormal) Collected: 11/12/20 2119    Specimen: Blood Updated: 11/12/20 2136     Glucose 164 mg/dL      Comment: Serial Number: [id number]:  [id number]       POC Glucose Once [[id number]]  (Normal) Collected: 11/12/20 1722    Specimen: Blood Updated: 11/12/20 1737     Glucose 125 mg/dL      Comment: Serial Number: [id number]:  [id number]                 Assessment/Plan       Small bowel obstruction (CMS/HCC)    Spinal stenosis, lumbar region, with neurogenic claudication    Atrial fibrillation (CMS/HCC)    Generalized anxiety disorder    GERD without esophagitis    Patient stable.  Beginning liquids today as she has had at least a couple of bowel movements.  Hopefully her expected postoperative ileus is beginning to resolve.  Given her age and extent of surgery prognosis still relatively poor.  No obvious intra-abdominal issue currently.    Saqib Black MD  11/13/20  15:41 EST

## 2020-11-13 NOTE — THERAPY TREATMENT NOTE
OT tx held d/t newly discovered pubic rami fracture and waiting for ortho consult regarding weightbearing status. Will follow up at later date.

## 2020-11-13 NOTE — CONSULTS
Adult Nutrition  Assessment/PES    Patient Name:  Tunde Barrow  YOB: 1931  MRN: [id number]  Admit Date:  11/4/2020    Assessment Date:  11/13/2020    Comments:    RD received consult for TPN Recs. RD notes pt had PICC placed.      Rec #1: Initiate TPN as medically appropriate: Dextrose 20%, AA 5% @30 ml/hr. TPN to provide 489 Dextrose kcal, 36 gms protein.    Rec #2: Give lipids, 250 mL, 20% to provide an additional 500 kcals daily.  Rec. #3: Give TPN Lytes.    Rec. #4: MVI daily and Trace elements Q3rd Day   Rec. #5: Labs: Mg, Ph, K+ with AM labs and TG Q3rd day.     RD to follow pt and adjust recommendations based on labs and nutrient needs. Consult RD PRN.      Reason for Assessment     Row Name 11/13/20 1112          Reason for Assessment    Reason For Assessment  TF/PN;follow-up protocol     Diagnosis  gastrointestinal disease;renal disease;cardiac disease SBO, AVI, Lactic acidosis, Asymptomatic bacteriuria, Afib, GERD, Small bowel resection     Identified At Risk by Screening Criteria  MST SCORE 2+;tube feeding or parenteral nutrition;difficulty chewing/swallowing           Anthropometrics     Row Name 11/13/20 0500          Anthropometrics    Weight  68.3 kg (150 lb 9.2 oz)         Labs/Tests/Procedures/Meds     Row Name 11/13/20 1112          Labs/Procedures/Meds    Lab Results Reviewed  reviewed, pertinent     Lab Results Comments  Low: Alb High: Gluc, BUN        Medications    Pertinent Medications Reviewed  reviewed, pertinent     Pertinent Medications Comments  Novolog, TPN         Physical Findings     Row Name 11/13/20 1113          Physical Findings    Skin  surgical incision Abdominal incision           Nutrition Prescription Ordered     Row Name 11/13/20 1114          Nutrition Prescription PO    Current PO Diet  NPO;Sips/ice chips        Nutrition Prescription PN    PN Route  Peripheral     PN Goal Rate (mL/hr)  40 mL/hr     PN Current Rate (mL/hr)  40 mL/hr     PN Goal Volume (mL)   960 mL     PN Current Volume (mL)  960 mL     Dextrose Concentration (%)  10 %     Dextrose (Kcal)  326     Amino Acid Concentration (%)  4.25%     Amino Acid (gm)  40.8     Lipid Concentration (%)  20%     Lipid Volume (mL)  250 mL     Lipid Frequency  Daily         Evaluation of Received Nutrient/Fluid Intake     Row Name 11/13/20 1126          PO Evaluation    Number of Days PO Intake Evaluated  Insufficient Data        PN Evaluation    Number of Days PN Evaluated  2 days     PN Average delivery (mL/day)   650 mL/day     PN Average lipid delivery (mL/day)   125 mL/day               Problem/Interventions:  Problem 1     Row Name 11/13/20 1127          Nutrition Diagnoses Problem 1    Problem 1  Inadequate Intake/Infusion     Inadequate Intake Type  Oral;Parenteral     Macronutrient  Kcal;Fat;Fluid;Fiber;Carbohydrate;Protein     Micronutrient  Vitamin;Mineral     Etiology (related to)  MNT for Treatment/Condition     Signs/Symptoms (evidenced by)  NPO     Resolved?  Yes PPN run over past 2 days, TPN to begin today advancing to meet nutrient needs         Problem 2     Row Name 11/13/20 1128          Nutrition Diagnoses Problem 2    Problem 2  Impaired Nutrient Utilization     Etiology (related to)  Medical Diagnosis     Renal  AVI     Signs/Symptoms (evidenced by)  Biochemical     Specific Labs Noted  BUN         Problem 3     Row Name 11/13/20 1128          Nutrition Diagnoses Problem 3    Problem 3  Needs Alternate Route     Etiology (related to)  Medical Diagnosis     Gastrointestinal  SBO     Signs/Symptoms (evidenced by)  Report/Observation     Reported/Observed By  MD           Intervention Goal     Row Name 11/13/20 1128          Intervention Goal    General  Meet nutritional needs for age/condition;Improved nutrition related lab(s);Nutrition support treatment     PO  Other (comment) Continue NPO diet order with sips and chips while pt receives TPN     TF/PN  Adjust TF/PN     Transition  PN to PO     Weight   Maintain weight         Nutrition Intervention     Row Name 11/13/20 1129          Nutrition Intervention    RD/Tech Action  Follow Tx progress;Recommend/ordered     Recommended/Ordered  PN         Nutrition Prescription     Row Name 11/13/20 1129          Nutrition Prescription PO    PO Prescription  Other (comment) Continue NPO diet order with sips and ice chips while pt receives TPN     New PO Prescription Ordered?  No, recommended        Nutrition Prescription PN    Parenteral Prescription  PN begin/change     PN Route  PICC     Dextrose Concentration (%)  20 %     Dextrose (Kcal)  489.6     Amino Acid Concentration (%)  5.0%     Amino Acid (gm)  36     PN Goal Rate (mL/hr)  30 mL/hr     PN Starting Rate (mL/hr)  30 mL/hr     PN Goal Volume (mL)  720 mL     PN Starting Volume (mL)  720 mL     Lipid Concentration (%)  20%     Lipid Volume (mL)  250 mL     Lipid Frequency  Daily     New PN Prescription Ordered?  Yes        Other Orders    Obtain Weight  Daily     Obtain Weight Ordered?  No, recommended     Supplement  Vitamin mineral supplement     Supplement Ordered?  No, recommended     Labs  Mg++;Phos;K+         Education/Evaluation     Row Name 11/13/20 1137          Education    Education  Will Instruct as appropriate        Monitor/Evaluation    Monitor  Per protocol;I&O;Pertinent labs;PN delivery/tolerance;Weight;Skin status           Electronically signed by:  Hanny Solis RD  11/13/20 11:43 EST

## 2020-11-13 NOTE — PLAN OF CARE
Goal Outcome Evaluation:  Plan of Care Reviewed With: patient, family  Progress: no change   Tunde's VSS. She sleeps most of the time even with family in the room. Diet advanced to full liquids but Tunde is not eating or drinking much at this time; Very small sips of water and no food.

## 2020-11-13 NOTE — THERAPY TREATMENT NOTE
Will hold treatment d/t pubic rami fx,awaiting ortho consult regarding weightbearing status. Will f/u with at a later time.

## 2020-11-14 NOTE — PLAN OF CARE
Goal Outcome Evaluation:  Plan of Care Reviewed With: patient  Progress: no change   VSS. Pt rested well. Prn meds given to control nausea. Pain controlled per MAR. Will continue to monitor.

## 2020-11-14 NOTE — PLAN OF CARE
Problem: Adult Inpatient Plan of Care  Goal: Plan of Care Review  Recent Flowsheet Documentation  Taken 11/14/2020 1209 by Manuela Otto, BANDAR  Progress: no change  Plan of Care Reviewed With:   patient   daughter  Outcome Summary: PT treatment completed with pt continuing to have B hip pain with any LE ther ex, movement or wt bearing. Pt was alert during the session, but inconsistent with following commands and answering questions. Pt primarily breathing through her mouth and pt's mouth swabbed with moisture with pt expressing increased comfort. Pt needed max assist with bed mobility and practiced sitting balance on EOB x 8 min with close SBA to min assist, with pt increasingly leaning posteriorly as she fatigued. Pt need max assist for sit to stand transfers with B knees blocked maintaining 8-10 sec. Pt's daughter present throughout session and assisted pt appropriately. Cont current PT POC.

## 2020-11-14 NOTE — PROGRESS NOTES
LOS: 10 days   Patient Care Team:  Lorna Hagen MD as PCP - General (Internal Medicine)  Lorna Hagen MD as Referring Physician (Internal Medicine)    Chief Complaint: Status post exploratory laparotomy for small bowel obstruction    Subjective     Interval History:   No acute events reported overnight.  Patient seen by orthopedic surgery this morning due to complaint of hip pain.  Recall that plain films demonstrated a left superior pubic ramus fracture without any reported history of trauma or fall.  Per the consultation note from Dr. Tse, the plan is for nonoperative management and the patient may be weightbearing as tolerated.    No reported nausea or vomiting.  The patient does not complain of pain at the time my evaluation.  In fact, she does not verbalize any particular complaints, although she is noted to be somewhat somnolent during my exam.  Unfortunately, no family was present at the bedside during this evaluation.      Objective     Vital Signs  Temp:  [96.7 °F (35.9 °C)-97.7 °F (36.5 °C)] 97.7 °F (36.5 °C)  Heart Rate:  [69-96] 86  Resp:  [14-20] 17  BP: (112-145)/(60-90) 145/90    Physical Exam:    General Appearance:   Somewhat somnolent, but in no acute distress.   Head:    Normocephalic, without obvious abnormality, atraumatic   Eyes:            Lids and lashes normal, conjunctivae and sclerae normal, no   icterus, no pallor, corneas clear, PERRLA   Ears:    Ears appear intact with no abnormalities noted   Lungs:     respirations regular, even and unlabored    Heart:    Regular rhythm and normal rate, normal S1 and S2, no            murmur, no gallop, no rub, no click   Abdomen:    Somewhat tense, and distended.  Noted bowel sounds to auscultation.  Incision is clean, dry, and intact with scattered surrounding ecchymoses.  No peritoneal signs.   Extremities:  No edema       Neurologic:  Grossly nonfocal        Results Review:    I reviewed the patient's new clinical  results.    Results from last 7 days   Lab Units 11/14/20  0549 11/13/20  0623 11/12/20  0603   SODIUM mmol/L 134* 135* 136   POTASSIUM mmol/L 3.7 3.9 3.6   CHLORIDE mmol/L 103 104 105   CO2 mmol/L 22.7 20.6* 23.0   BUN mg/dL 48* 44* 41*   CREATININE mg/dL 1.05* 0.87 0.80   CALCIUM mg/dL 10.1 9.9 9.8   BILIRUBIN mg/dL 0.7 0.7 1.0   ALK PHOS U/L 121* 109 94   ALT (SGPT) U/L 12 14 15   AST (SGOT) U/L 18 19 21   GLUCOSE mg/dL 168* 160* 167*       Results from last 7 days   Lab Units 11/12/20  0603 11/11/20  0657 11/10/20  0556   WBC 10*3/mm3 14.03* 15.74* 12.39*   HEMOGLOBIN g/dL 12.2 12.3 12.6   HEMATOCRIT % 37.1 36.8 38.0   PLATELETS 10*3/mm3 192 214 214         Medication Review:   Scheduled Meds:diclofenac, 2 g, Topical, 4x Daily  HYDROcodone-acetaminophen, 1 tablet, Oral, BID  insulin aspart, 0-7 Units, Subcutaneous, TID AC  metoprolol tartrate, 100 mg, Oral, Q12H  sertraline, 50 mg, Oral, Daily  sodium chloride, 10 mL, Intravenous, Q12H  sodium chloride, 10 mL, Intravenous, Q12H  sodium chloride, 3 mL, Intravenous, Q12H      Continuous Infusions:Adult Central Clinimix TPN, , Last Rate: 30 mL/hr at 11/14/20 1521    And  Fat Emulsion Plant Based, 250 mL, Last Rate: 50 g (11/14/20 1520)  dextrose 5 % and sodium chloride 0.45 %, 75 mL/hr, Last Rate: 75 mL/hr (11/12/20 0935)      PRN Meds:.•  acetaminophen **OR** acetaminophen **OR** acetaminophen  •  ALPRAZolam  •  benzocaine  •  dextrose  •  dextrose  •  glucagon (human recombinant)  •  hydrALAZINE  •  labetalol  •  Morphine  •  nitroglycerin  •  ondansetron  •  prochlorperazine  •  sodium chloride  •  sodium chloride  •  sodium chloride  •  sodium chloride  •  sodium chloride      Assessment/Plan       Small bowel obstruction (CMS/HCC)    Spinal stenosis, lumbar region, with neurogenic claudication    Atrial fibrillation (CMS/HCC)    Generalized anxiety disorder    GERD without esophagitis    Ms. Barrow is an 89-year-old female patient status post Sporter  laparotomy for management of acute small bowel obstruction secondary to volvulus and adhesive disease.  She has had a prolonged postoperative ileus, and is tolerating small amounts of oral intake with TPN for supplemental nutrition.  Continue liquid diet as tolerated, and monitor for additional bowel function.  Unfortunately, I think her overall prognosis is relatively poor given her advanced age, multiple medical comorbidities, weakness/deconditioning.        Porsche Huizar MD  11/14/20  14:44 EST

## 2020-11-14 NOTE — THERAPY TREATMENT NOTE
"Patient Name: Tunde Barrow  : 1931    MRN: [id number]                              Today's Date: 2020       Admit Date: 2020    Visit Dx:     ICD-10-CM ICD-9-CM   1. Small bowel obstruction (CMS/HCC)  K56.609 560.9   2. Renal insufficiency  N28.9 593.9   3. Lactic acidosis  E87.2 276.2   4. Bowel obstruction (CMS/HCC)  K56.609 560.9     Patient Active Problem List   Diagnosis   • Spinal stenosis, lumbar region, with neurogenic claudication   • Small bowel obstruction (CMS/HCC)   • Atrial fibrillation (CMS/HCC)   • Generalized anxiety disorder   • GERD without esophagitis     Past Medical History:   Diagnosis Date   • A-fib (CMS/HCC)    • Anxiety and depression    • Arthritis    • Body piercing     BOTH EARS   • Cataract, bilateral    • Chronic back pain    • Diverticulitis    • GERD (gastroesophageal reflux disease)    • History of nuclear stress test 2017   • [place] (hard of hearing)     SLIGHTLY-NO HEARING AIDS   • Oxygen dependent     AT NIGHT.  UNSURE HOW MUCH SHE IS ON.    • Wears dentures     UPPER PLATE ONLY     Past Surgical History:   Procedure Laterality Date   • APPENDECTOMY     • BREAST SURGERY Right     FIBROID TUMORS X 3    • CARDIAC CATHETERIZATION      STATED \"IT WAS QUITE A WHILE AGO.  I DIDN'T NEED STENTS\".     • CATARACT EXTRACTION W/ INTRAOCULAR LENS IMPLANT Right 2019    Procedure: CATARACT PHACO EXTRACTION WITH INTRAOCULAR LENS IMPLANT RIGHT;  Surgeon: Cindy Olsen MD;  Location: [facility];  Service: Ophthalmology   • CATARACT EXTRACTION W/ INTRAOCULAR LENS IMPLANT Left 2019    Procedure: CATARACT PHACO EXTRACTION WITH INTRAOCULAR LENS IMPLANT LEFT EYE;  Surgeon: Cindy Olsen MD;  Location: [facility] OR;  Service: Ophthalmology   • CHOLECYSTECTOMY     • COLONOSCOPY     • ENDOSCOPY     • EXPLORATORY LAPAROTOMY N/A 2020    Procedure: LAPAROTOMY EXPLORATORY WITH SMALL BOWEL RESECTION;  Surgeon: Umberto Martin MD;  Location: [facility] OR;  Service: General;  " Laterality: N/A;   • HYSTERECTOMY     • LAPAROSCOPIC TUBAL LIGATION     • TONSILLECTOMY       General Information     Row Name 11/14/20 1209          Physical Therapy Time and Intention    Mode of Treatment  physical therapy;individual therapy  -TW     Row Name 11/14/20 [facility]9          General Information    Patient Profile Reviewed  yes Ortho note did confirm pt is WBAT for LLE  -TW     Row Name 11/14/20 1209          Cognition    Orientation Status (Cognition)  oriented to;person;situation;place  -     Row Name 11/14/20 1209          Safety Issues, Functional Mobility    Safety Issues Affecting Function (Mobility)  ability to follow commands;awareness of need for assistance;friction/shear risk;insight into deficits/self-awareness;safety precaution awareness;safety precautions follow-through/compliance;sequencing abilities  -TW     Impairments Affecting Function (Mobility)  balance;endurance/activity tolerance;pain;strength;postural/trunk control  -TW     Comment, Safety Issues/Impairments (Mobility)  Pt only answering occasionals questions and mostly with short one word answers.  -TW       User Key  (r) = Recorded By, (t) = Taken By, (c) = Cosigned By    Initials Name Provider Type    TW Manuela Otto, PT Physical Therapist        Mobility     Row Name 11/14/20 [facility]9          Bed Mobility    Bed Mobility  supine-sit;sit-supine;scooting/bridging  -TW     Scooting/Bridging [place] (Bed Mobility)  maximum assist (25% patient effort);2 person assist  -TW     Supine-Sit [place] (Bed Mobility)  maximum assist (25% patient effort);verbal cues;nonverbal cues (demo/gesture)  -TW     Sit-Supine [place] (Bed Mobility)  maximum assist (25% patient effort);verbal cues;nonverbal cues (demo/gesture)  -TW     Assistive Device (Bed Mobility)  head of bed elevated;draw sheet  -TW     Comment (Bed Mobility)  Pt had a resistance to moving LE's to EOB. It was difficult to assess if this was due to pain or increased tone.  Pt was not able to clarifiy for therapist.  -     Row Name 11/14/20 1209          Transfers    Comment (Transfers)  Attempted with walker x 1 with pt not able to fully reaching standing with +2 PA; Without walker stood with B knees blocked for 5-10 sec  x 3. Pt did hold onto therapist with BUEs and assisted with push up with LEs  -     Row Name 11/14/20 1209          Bed-Chair Transfer    Bed-Chair [place] (Transfers)  unable to assess  -     Row Name 11/14/20 1209          Sit-Stand Transfer    Sit-Stand [place] (Transfers)  maximum assist (25% patient effort);2 person assist  -TW     Assistive Device (Sit-Stand Transfers)  walker, front-wheeled  -     Row Name 11/14/20 1209          Gait/Stairs (Locomotion)    [place] Level (Gait)  unable to assess  -     Row Name 11/14/20 1209          Mobility    Extremity Weight-bearing Status  left lower extremity  -TW     Left Lower Extremity (Weight-bearing Status)  weight-bearing as tolerated (WBAT)  -       User Key  (r) = Recorded By, (t) = Taken By, (c) = Cosigned By    Initials Name Provider Type    TW Manuela Otto, BANDAR Physical Therapist        Obj/Interventions     Row Name 11/14/20 1320          Hip (Therapeutic Exercise)    Hip (Therapeutic Exercise)  AAROM (active assistive range of motion) As pt tolerated x 5; pt expressed increased pain with either hip movement.  -     Row Name 11/14/20 1320          Balance    Balance Assessment  sitting static balance;sitting dynamic balance;standing static balance  -TW     Static Sitting Balance  mild impairment;unsupported;sitting, edge of bed Pt sat on EOB x 8 min with close supervision to min assist  -TW     Dynamic Sitting Balance  moderate impairment;unsupported;sitting, edge of bed With LUE movement pt had increased posterior lean with slow response to LOB  -TW     Static Standing Balance  severe impairment;supported  -TW     Balance Interventions  sitting;dynamic  -TW       User Key  (r) =  Recorded By, (t) = Taken By, (c) = Cosigned By    Initials Name Provider Type    TW Manuela Otto, PT Physical Therapist        Goals/Plan    No documentation.       Clinical Impression     Row Name 11/14/20 1209          Pain    Additional Documentation  Pain Scale: FACES Pre/Post-Treatment (Group)  -[facility] Name 11/14/20 1209          Pain Scale: Numbers Pre/Post-Treatment    Pain Location - Side  Bilateral  -TW     Pain Location  hip  -TW     Pain Intervention(s)  Repositioned  -     Row Name 11/14/20 1209          Pain Scale: FACES Pre/Post-Treatment    Pain: FACES Scale, Pretreatment  2-->hurts little bit  -TW     Posttreatment Pain Rating  2-->hurts little bit  -TW     Pre/Posttreatment Pain Comment  Pt had increased B hip pain with any hip movement but once at rest stated hip pain returned to a discomfort.  -     Row Name 11/14/20 1209          Plan of Care Review    Plan of Care Reviewed With  patient;daughter  -TW     Progress  no change  -TW     Outcome Summary  PT treatment completed with pt continuing to have B hip pain with any LE ther ex, movement or wt bearing. Pt was alert during the session, but inconsistent with following commands and answering questions. Pt primarily breathing through her mouth and pt's mouth swabbed with moisture with pt expressing increased comfort. Pt needed max assist with bed mobility and practiced sitting balance on EOB x 8 min with close SBA to min assist, with pt increasingly leaning posteriorly as she fatigued. Pt need max assist for sit to stand transfers with B knees blocked maintaining 8-10 sec. Pt's daughter present throughout session and assisted pt appropriately. Cont current PT POC.  -     Row Name 11/14/20 1209          Vital Signs    O2 Delivery Pre Treatment  -- 6 L. Note that pt's pulse Ox monitor not reading well and changed x 3 with no better reading. Nursing informed.  -TW     Pre Patient Position  Supine  -TW     Intra Patient Position  Sitting   -TW     Post Patient Position  Supine  -TW     Row Name 11/14/20 1209          Positioning and Restraints    Pre-Treatment Position  in bed  -TW     Post Treatment Position  bed  -TW     In Bed  fowlers;call light within reach;encouraged to call for assist;exit alarm on;side rails up x3;with family/caregiver  -TW       User Key  (r) = Recorded By, (t) = Taken By, (c) = Cosigned By    Initials Name Provider Type    Manuela Hayes PT Physical Therapist        Outcome Measures     Row Name 11/14/20 1209          How much help from another person do you currently need...    Turning from your back to your side while in flat bed without using bedrails?  2  -TW     Moving from lying on back to sitting on the side of a flat bed without bedrails?  2  -TW     Moving to and from a bed to a chair (including a wheelchair)?  2  -TW     Standing up from a chair using your arms (e.g., wheelchair, bedside chair)?  2  -TW     Climbing 3-5 steps with a railing?  1  -TW     To walk in hospital room?  2  -TW     AM-PAC 6 Clicks Score (PT)  11 -TW       User Key  (r) = Recorded By, (t) = Taken By, (c) = Cosigned By    Initials Name Provider Type    Manuela Hayes PT Physical Therapist        Physical Therapy Education                 Title: PT OT SLP Therapies (In Progress)     Topic: Physical Therapy (Done)     Point: Mobility training (Done)     Learning Progress Summary           Patient Acceptance, E,D, NR,DU by  at 11/14/2020 1209    Comment: Pt education on bed mobility technique and sitting posture sitting on EOB.    Acceptance, E,TB,D, VU,NR by  at 11/12/2020 1242    Comment: safety with mobility    Acceptance, E,TB,D, VU,NR by  at 11/11/2020 1441    Comment: exercise techniques    Acceptance, E,TB,D, VU,DU by  at 11/10/2020 1103    Comment: importance of increasing activity daily    Acceptance, E,TB, VU by  at 11/8/2020 1324    Comment: importance of increasing time OOB daily    Acceptance, E,D, DU,VU by  at  11/7/2020 1037    Comment: Pt education for use of rolling walker for safe sit to stand and gait technique.   Family Acceptance, E,D, NR,DU by  at 11/14/2020 1209    Comment: Pt education on bed mobility technique and sitting posture sitting on EOB.    Acceptance, E,D, DU,VU by  at 11/7/2020 1037    Comment: Pt education for use of rolling walker for safe sit to stand and gait technique.                   Point: Home exercise program (Done)     Learning Progress Summary           Patient Acceptance, E,TB,D, VU,NR by  at 11/11/2020 1441    Comment: exercise techniques                   Point: Body mechanics (Done)     Learning Progress Summary           Patient Acceptance, E,D, NR,DU by  at 11/14/2020 1209    Comment: Pt education on bed mobility technique and sitting posture sitting on EOB.   Family Acceptance, E,D, NR,DU by  at 11/14/2020 1209    Comment: Pt education on bed mobility technique and sitting posture sitting on EOB.                               User Key     Initials Effective Dates Name Provider Type Discipline     03/07/18 -  Ping Mosquera, PTA Physical Therapy Assistant PT     03/07/18 -  Isael Alcantar, PTA Physical Therapy Assistant PT     03/26/19 -  Manuela Otto, PT Physical Therapist PT              PT Recommendation and Plan  Planned Therapy Interventions (PT): balance training, bed mobility training, gait training, home exercise program, patient/family education, strengthening, transfer training  Plan of Care Reviewed With: patient, daughter  Progress: no change  Outcome Summary: PT treatment completed with pt continuing to have B hip pain with any LE ther ex, movement or wt bearing. Pt was alert during the session, but inconsistent with following commands and answering questions. Pt primarily breathing through her mouth and pt's mouth swabbed with moisture with pt expressing increased comfort. Pt needed max assist with bed mobility and practiced sitting balance on EOB  x 8 min with close SBA to min assist, with pt increasingly leaning posteriorly as she fatigued. Pt need max assist for sit to stand transfers with B knees blocked maintaining 8-10 sec. Pt's daughter present throughout session and assisted pt appropriately. Cont current PT POC.     Time Calculation:   PT Charges     Row Name 11/14/20 1209             Time Calculation    Start Time  1136  -TW      Stop Time  1209  -TW      Time Calculation (min)  33 min  -TW      PT Received On  11/14/20  -TW         Timed Charges    93080 - PT Therapeutic Exercise Minutes  10  -TW      36961 - PT Therapeutic Activity Minutes  23  -TW        User Key  (r) = Recorded By, (t) = Taken By, (c) = Cosigned By    Initials Name Provider Type    TW Manuela Otto, PT Physical Therapist        Therapy Charges for Today     Code Description Service Date Service Provider Modifiers Qty    31510913278 HC PT THER PROC EA 15 MIN 11/14/2020 Manuela Otto, PT GP 1    93315363936 HC PT THERAPEUTIC ACT EA 15 MIN 11/14/2020 Manuela Otto PT GP 1          PT G-Codes  Outcome Measure Options: AM-PAC 6 Clicks Daily Activity (OT)  AM-PAC 6 Clicks Score (PT): 11  AM-PAC 6 Clicks Score (OT): 14    Manuela Otto PT  11/14/2020

## 2020-11-14 NOTE — CONSULTS
"    Patient Care Team:  Lorna Hagen MD as PCP - General (Internal Medicine)  Lorna Hagen MD as Referring Physician (Internal Medicine)    Chief complaint left hip pain  Subjective     Patient is a 89 y.o. female presents with left hip pain.  There was apparently no injury to her left hip she has been admitted to the hospital for small bowel obstruction status post laparotomy.  She is convalescing on the floor she complained of left hip pain she was evaluated with a x-ray which showed a superior rami fracture on the left.  She is without any other complaint today she is seen at bedside.      Review of Systems   Pertinent items are noted in HPI    History  Past Medical History:   Diagnosis Date   • A-fib (CMS/HCC)    • Anxiety and depression    • Arthritis    • Body piercing     BOTH EARS   • Cataract, bilateral    • Chronic back pain    • Diverticulitis    • GERD (gastroesophageal reflux disease)    • History of nuclear stress test 2017   • [place] (hard of hearing)     SLIGHTLY-NO HEARING AIDS   • Oxygen dependent     AT NIGHT.  UNSURE HOW MUCH SHE IS ON.    • Wears dentures     UPPER PLATE ONLY     Past Surgical History:   Procedure Laterality Date   • APPENDECTOMY     • BREAST SURGERY Right     FIBROID TUMORS X 3    • CARDIAC CATHETERIZATION      STATED \"IT WAS QUITE A WHILE AGO.  I DIDN'T NEED STENTS\".     • CATARACT EXTRACTION W/ INTRAOCULAR LENS IMPLANT Right 4/8/2019    Procedure: CATARACT PHACO EXTRACTION WITH INTRAOCULAR LENS IMPLANT RIGHT;  Surgeon: Cindy Olsen MD;  Location: [facility] OR;  Service: Ophthalmology   • CATARACT EXTRACTION W/ INTRAOCULAR LENS IMPLANT Left 6/17/2019    Procedure: CATARACT PHACO EXTRACTION WITH INTRAOCULAR LENS IMPLANT LEFT EYE;  Surgeon: Cindy Olsen MD;  Location: [facility] OR;  Service: Ophthalmology   • CHOLECYSTECTOMY     • COLONOSCOPY     • ENDOSCOPY     • EXPLORATORY LAPAROTOMY N/A 11/5/2020    Procedure: LAPAROTOMY EXPLORATORY WITH SMALL BOWEL " RESECTION;  Surgeon: Umberto Martin MD;  Location: [facility];  Service: General;  Laterality: N/A;   • HYSTERECTOMY     • LAPAROSCOPIC TUBAL LIGATION     • TONSILLECTOMY       History reviewed. No pertinent family history.  Social History     Tobacco Use   • Smoking status: Never Smoker   • Smokeless tobacco: Never Used   Substance Use Topics   • Alcohol use: No     Frequency: Never   • Drug use: No     Medications Prior to Admission   Medication Sig Dispense Refill Last Dose   • ALPRAZolam (XANAX) 0.25 MG tablet Take 0.25 mg by mouth every night at bedtime.   11/4/2020 at Unknown time   • apixaban (ELIQUIS) 2.5 MG tablet tablet Take 2.5 mg by mouth Every 12 (Twelve) Hours.   11/4/2020 at Unknown time   • aspirin 81 MG EC tablet Take 81 mg by mouth Daily.   11/4/2020 at Unknown time   • Cholecalciferol (VITAMIN D) 2000 units tablet Take 2,000 Units by mouth Daily.   11/4/2020 at Unknown time   • HYDROcodone-acetaminophen (NORCO) 5-325 MG per tablet Take 1 tablet by mouth 2 (Two) Times a Day.   11/5/2020 at Unknown time   • metoprolol tartrate (LOPRESSOR) 100 MG tablet Take 100 mg by mouth 2 (Two) Times a Day.   Past Month at Unknown time   • omeprazole (priLOSEC) 20 MG capsule Take 20 mg by mouth Daily.   11/5/2020 at Unknown time   • pravastatin (PRAVACHOL) 20 MG tablet Take 20 mg by mouth Daily.   11/4/2020 at Unknown time   • sertraline (ZOLOFT) 50 MG tablet TAKE 1 TABLET BY MOUTH ONCE DAILY DOSE INCREASED  0 11/4/2020 at Unknown time   • Lidocaine 4 % patch Apply 1 patch topically Daily As Needed.   Unknown at Unknown time     Allergies:  Patient has no known allergies.    Objective     Vital Signs  Temp:  [96.7 °F (35.9 °C)-98 °F (36.7 °C)] 97.5 °F (36.4 °C)  Heart Rate:  [] 69  Resp:  [14-20] 20  BP: (112-130)/(60-74) 122/70    Physical Exam:      General Appearance:    Alert, cooperative, in no acute distress   Head:    Normocephalic, without obvious abnormality, atraumatic   Eyes:            Lids and  lashes normal, conjunctivae and sclerae normal, no   icterus, no pallor, corneas clear, PERRLA   Ears:    Ears appear intact with no abnormalities noted   Throat:   No oral lesions, no thrush, oral mucosa moist   Neck:   No adenopathy, supple, trachea midline, no thyromegaly, no     carotid bruit, no JVD                       Genitalia:    Deferred   Extremities:   Moves all extremities well, no edema, no cyanosis, no              redness she does have tenderness through her left hip on range of motion into her pelvis   Pulses:   Pulses palpable and equal bilaterally   Skin:   No bleeding, bruising or rash   Lymph nodes:   No palpable adenopathy           Results Review:    I reviewed the patient's new clinical results.    Assessment/Plan       Small bowel obstruction (CMS/HCC)    Spinal stenosis, lumbar region, with neurogenic claudication    Atrial fibrillation (CMS/HCC)    Generalized anxiety disorder    GERD without esophagitis  Left superior rami fracture    Plan is nonoperative management  She is allowed to be weightbearing as tolerated  She can participate with physical therapy    I discussed the patients findings and my recommendations with patient.     Justin Tse MD  11/14/20  08:12 EST

## 2020-11-14 NOTE — PROGRESS NOTES
"    [facility]     LOS: 10 days    Patient Care Team:  Lorna Hagen MD as PCP - General (Internal Medicine)  Lorna Hagen MD as Referring Physician (Internal Medicine)    Chief Complaint:    Chief Complaint   Patient presents with   • Abdominal Pain   • Vomiting   • Nausea       Subjective:  I have reviewed labs/imaging/records from this hospitalization, including ER staff and admitting/attending physicians H/P's and progress notes to establish a comprehensive understanding of this patient's clinical hospital course, as well as to establish plan of care appropriately.     Patient seen and examined this morning.  Events from last night noted.  Patient denies having any fevers chills.  She does have some nausea no vomiting no abdominal pain.  Denies any chest pain, shortness of breath or cough and sputum production.  There is no significant edema.   Patient also denies having new onset weakness of numbness of either extremity.      Review of Systems:    The pertinent  ROS was done and it is noted above, rest  was negative.    Objective:    Vital Signs  /70 (BP Location: Left arm, Patient Position: Lying)   Pulse 69   Temp 97.5 °F (36.4 °C) (Axillary)   Resp 20   Ht 165.1 cm (65\")   Wt 68.3 kg (150 lb 9.2 oz)   SpO2 90%   BMI 25.06 kg/m²       No intake/output data recorded.    Intake/Output Summary (Last 24 hours) at 11/14/2020 1008  Last data filed at 11/14/2020 0900  Gross per 24 hour   Intake 0 ml   Output 150 ml   Net -150 ml       Physical Exam:    General Appearance: alert, oriented x 3, no acute distress,   HEENT: Oral mucosa dry, extra occular movements intact. Sclera clear.  Skin: Warm and dry  Neck: supple, no JVD, trachea midline  Lungs:Chest shape is normal. Breath sounds heard bilaterally. No crackles, No wheezing.   Heart: RRR, normal S1 and S2, no S3, no rub, peripheral pulses weak but palpable.  Abdomen: Firm, non-tender,  " present bowel sounds to auscultation  : no palpable bladder.  Extremities: no edema, cyanosis or clubbing.   Neuro: normal speech and grossly non focal.     Results Review:    Results from last 7 days   Lab Units 11/14/20  0549 11/13/20  0623 11/12/20  0603   SODIUM mmol/L 134* 135* 136   POTASSIUM mmol/L 3.7 3.9 3.6   CHLORIDE mmol/L 103 104 105   CO2 mmol/L 22.7 20.6* 23.0   BUN mg/dL 48* 44* 41*   CREATININE mg/dL 1.05* 0.87 0.80   CALCIUM mg/dL 10.1 9.9 9.8   ALBUMIN g/dL 2.70* 2.80* 3.00*   BILIRUBIN mg/dL 0.7 0.7 1.0   ALK PHOS U/L 121* 109 94   ALT (SGPT) U/L 12 14 15   AST (SGOT) U/L 18 19 21   GLUCOSE mg/dL 168* 160* 167*       Estimated Creatinine Clearance: 39.2 mL/min (A) (by C-G formula based on SCr of 1.05 mg/dL (H)).    Results from last 7 days   Lab Units 11/14/20  0549 11/13/20  0623 11/12/20  0603   MAGNESIUM mg/dL 1.9 1.7 1.7   PHOSPHORUS mg/dL 3.1 3.2 2.8             Results from last 7 days   Lab Units 11/12/20  0603 11/11/20  0657 11/10/20  0556 11/09/20  0610 11/08/20  0554   WBC 10*3/mm3 14.03* 15.74* 12.39* 16.81* 18.02*   HEMOGLOBIN g/dL 12.2 12.3 12.6 11.9* 12.1   PLATELETS 10*3/mm3 192 214 214 208 177               Imaging Results (Last 24 Hours)     Procedure Component Value Units Date/Time    XR Chest 1 View [[id number]] Collected: 11/13/20 1045     Updated: 11/13/20 1051    Narrative:      Portable chest     INDICATION: PICC line placement.     FINDINGS: Single frontal portable chest. Comparison with 11/12/2020. EKG  leads overlie the chest. Right-sided PICC line terminates in the distal  superior vena cava. No pneumothorax. Findings are again consistent with  bilateral effusions and infiltrates and/or atelectasis. Fluid overload  should be considered. These findings are slightly improved.       Impression:      1. Interval PICC line placement.  2. Slight improvement in aeration. Continued follow up recommended.     This report was finalized on 11/13/2020 10:49 AM by Oswaldo Leon MD.         diclofenac, 2 g, Topical, 4x Daily  HYDROcodone-acetaminophen, 1 tablet, Oral, BID  insulin aspart, 0-7 Units, Subcutaneous, TID AC  metoprolol tartrate, 100 mg, Oral, Q12H  sertraline, 50 mg, Oral, Daily  sodium chloride, 10 mL, Intravenous, Q12H  sodium chloride, 10 mL, Intravenous, Q12H  sodium chloride, 3 mL, Intravenous, Q12H      Adult Central Clinimix TPN, , Last Rate: 30 mL/hr at 11/13/20 1539  Adult Central Clinimix TPN,     And  Fat Emulsion Plant Based, 250 mL  dextrose 5 % and sodium chloride 0.45 %, 75 mL/hr, Last Rate: 75 mL/hr (11/12/20 0935)        Medication Review:   Current Facility-Administered Medications   Medication Dose Route Frequency Provider Last Rate Last Dose   • acetaminophen (TYLENOL) tablet 650 mg  650 mg Oral Q4H PRN Umberto Martin MD        Or   • acetaminophen (TYLENOL) 160 MG/5ML solution 650 mg  650 mg Oral Q4H PRN Umberto Martin MD        Or   • acetaminophen (TYLENOL) suppository 650 mg  650 mg Rectal Q4H PRN Umberto Martin MD       • Adult Central Clinimix TPN   Intravenous Continuous Florian Chandler MD 30 mL/hr at 11/13/20 1539     • Adult Central Clinimix TPN   Intravenous Continuous Florian Chandler MD        And   • Fat Emulsion Plant Based (INTRALIPID,LIPOSYN) 20 % infusion 50 g  250 mL Intravenous Continuous Florian Chandler MD       • ALPRAZolam (XANAX) tablet 0.25 mg  0.25 mg Oral BID PRN Umberto Martin MD   0.25 mg at 11/11/20 0513   • benzocaine (HURRICAINE) 20 % liquid solution 1 spray  1 spray Mouth/Throat 4x Daily PRN Saqib Black MD   1 spray at 11/07/20 1512   • dextrose (D50W) 25 g/ 50mL Intravenous Solution 25 g  25 g Intravenous Q15 Min PRN Nigel Falcon,        • dextrose (GLUTOSE) oral gel 1 tube  1 tube Oral Q15 Min PRN Nigel Falcon DO       • dextrose 5 % and sodium chloride 0.45 % infusion  75 mL/hr Intravenous Continuous Nigel Falcon DO 75 mL/hr at 11/12/20 0935 75 mL/hr at 11/12/20 0935   • diclofenac  (VOLTAREN) 1 % gel 2 g  2 g Topical 4x Daily Florian Chandler MD   2 g at 11/13/20 2142   • glucagon (human recombinant) (GLUCAGEN DIAGNOSTIC) injection 1 mg  1 mg Subcutaneous PRN Nigel Falcon DO       • hydrALAZINE (APRESOLINE) injection 10 mg  10 mg Intravenous Q6H PRN Nigel Falcon DO   10 mg at 11/11/20 0344   • HYDROcodone-acetaminophen (NORCO) 5-325 MG per tablet 1 tablet  1 tablet Oral BID Umberto Martin MD   1 tablet at 11/13/20 2142   • insulin aspart (novoLOG) injection 0-7 Units  0-7 Units Subcutaneous TID AC Nigel Falcon DO   2 Units at 11/14/20 0659   • labetalol (NORMODYNE,TRANDATE) injection 10 mg  10 mg Intravenous Q4H PRN Florian Chandler MD       • metoprolol tartrate (LOPRESSOR) tablet 100 mg  100 mg Oral Q12H Umberto Martin MD   100 mg at 11/13/20 2142   • Morphine sulfate (PF) injection 2 mg  2 mg Intravenous Q4H PRN Florian Chandler MD   2 mg at 11/13/20 0545   • nitroglycerin (NITROSTAT) SL tablet 0.4 mg  0.4 mg Sublingual Q5 Min PRN Nigel Falcon DO   0.4 mg at 11/09/20 1033   • ondansetron (ZOFRAN) injection 4 mg  4 mg Intravenous Q6H PRN Umberto Martin MD   4 mg at 11/13/20 2155   • prochlorperazine (COMPAZINE) injection 2.5 mg  2.5 mg Intravenous Q6H PRN Nigel Falcon DO   2.5 mg at 11/09/20 1722   • sertraline (ZOLOFT) tablet 50 mg  50 mg Oral Daily Umberto Martin MD   Stopped at 11/13/20 0900   • sodium chloride 0.9 % flush 10 mL  10 mL Intravenous PRN Umberto Martin MD   10 mL at 11/11/20 1646   • sodium chloride 0.9 % flush 10 mL  10 mL Intravenous Q12H Umberto Matrin MD   10 mL at 11/13/20 2143   • sodium chloride 0.9 % flush 10 mL  10 mL Intravenous PRN Umberto Martin MD       • sodium chloride 0.9 % flush 10 mL  10 mL Intravenous Q12H Florian Chandler MD   10 mL at 11/13/20 2143   • sodium chloride 0.9 % flush 10 mL  10 mL Intravenous PRN Florian Chandler MD       • sodium chloride 0.9 % flush 20 mL  20 mL Intravenous PRN  Florian Chandler MD       • sodium chloride 0.9 % flush 3 mL  3 mL Intravenous Q12H Umberto Martin MD   3 mL at 11/13/20 2143   • sodium chloride 0.9 % flush 3-10 mL  3-10 mL Intravenous PRN Umberto Martin MD           Assessment/Plan:  1.  Acute small bowel obstruction secondary to small bowel volvulus and adhesions, POA.  2.  Status post exploratory laparotomy, small bowel resection and additional lysis on 11/5/2020.  3.  Prolonged postoperative ileus.  4.  Paroxysmal atrial fibrillation, anticoagulation is currently on hold.  5.  Essential hypertension.  6.  Generalized anxiety disorder.  7.  Bilateral lower lobe atelectasis.  8.  Osteoarthritis with the left knee joint pain.      Plan:  · Clinically she looks fairly stable, I can hear the bowel sounds although the belly is still fairly firm.  · Continue with rest of the treatment plan and surveillance lab.  · Details were discussed with the patient as well as family in the room.    · Further recommendations will depend on clinical course of the patient during the current hospitalization.   · I also discussed the details with the nursing staff.  · Rest as ordered.    Tyron Boyd MD, FASN  11/14/20  10:08 EST    Dictated using Dragon.

## 2020-11-14 NOTE — PROGRESS NOTES
Adult Nutrition  Assessment/PES    Patient Name:  Tunde Barrow  YOB: 1931  MRN: [id number]  Admit Date:  11/4/2020    Assessment Date:  11/14/2020    Comments:    Pt currently receiving TPN via PICC. Pt progressed to full liquid diet order however due to low PO intake, TPN recommended to continue.      Rec #1: Continue TPN as medically appropriate: Dextrose 20%, AA 5% @30 ml/hr. TPN to provide 489 Dextrose kcal, 36 gms protein.    Rec #2: Give lipids, 250 mL, 20% to provide an additional 500 kcals daily.  Rec #3: Replace Na+.  Rec. #4: Give TPN Lytes.    Rec. #5: MVI daily and Trace elements Q3rd Day.  Rec. #6: Labs: Mg, Ph, K+ with AM labs and TG Q3rd day.      RD to follow pt and adjust recommendations based on labs and nutrient needs. If TPN is to discontinue, wean to 25 mL/hr rate for at least 8 hours prior to discontinuing TPN. Consult RD PRN.    Reason for Assessment     Row Name 11/14/20 0838          Reason for Assessment    Reason For Assessment  TF/PN;follow-up protocol     Diagnosis  gastrointestinal disease;renal disease;cardiac disease SBO, AVI, Lactic acidosis, Asymptomatic bacteriuria, Afib, GERD, Small bowel resection     Identified At Risk by Screening Criteria  MST SCORE 2+;tube feeding or parenteral nutrition;difficulty chewing/swallowing             Labs/Tests/Procedures/Meds     Row Name 11/14/20 0838          Labs/Procedures/Meds    Lab Results Reviewed  reviewed, pertinent     Lab Results Comments  Low: Na+, Alb High: Gluc, BUN, Cr        Medications    Pertinent Medications Reviewed  reviewed, pertinent     Pertinent Medications Comments  TPN, Novolog         Physical Findings     Row Name 11/14/20 0838          Physical Findings    Skin  surgical incision Abdominal incision           Nutrition Prescription Ordered     Row Name 11/14/20 0838          Nutrition Prescription PO    Current PO Diet  Full Liquid        Nutrition Prescription PN    PN Route  Peripheral     PN Goal  Rate (mL/hr)  30 mL/hr     PN Current Rate (mL/hr)  30 mL/hr     PN Goal Volume (mL)  720 mL     PN Current Volume (mL)  720 mL     Dextrose Concentration (%)  20 %     Dextrose (Kcal)  489     Amino Acid Concentration (%)  5 %     Amino Acid (gm)  36     Lipid Concentration (%)  20%     Lipid Volume (mL)  250 mL     Lipid Frequency  Daily         Evaluation of Received Nutrient/Fluid Intake     Row Name 11/14/20 0840          PO Evaluation    Number of Days PO Intake Evaluated  Insufficient Data        PN Evaluation    Number of Days PN Evaluated  3 days     PN Average delivery (mL/day)   648 mL/day     PN Average lipid delivery (mL/day)   166 mL/day               Problem/Interventions:  Problem 1     Row Name 11/14/20 0842          Nutrition Diagnoses Problem 1    Problem 1  Inadequate Intake/Infusion     Inadequate Intake Type  Oral;Parenteral     Macronutrient  Kcal;Fat;Fluid;Fiber;Carbohydrate;Protein     Micronutrient  Vitamin;Mineral     Etiology (related to)  MNT for Treatment/Condition     Signs/Symptoms (evidenced by)  NPO     Resolved?  Yes TPN maintained         Problem 2     Row Name 11/14/20 0843          Nutrition Diagnoses Problem 2    Problem 2  Impaired Nutrient Utilization     Etiology (related to)  Medical Diagnosis     Renal  AVI     Signs/Symptoms (evidenced by)  Biochemical     Specific Labs Noted  BUN;Creatinine         Problem 3     Row Name 11/14/20 0843          Nutrition Diagnoses Problem 3    Problem 3  Needs Alternate Route     Etiology (related to)  Medical Diagnosis     Gastrointestinal  SBO     Signs/Symptoms (evidenced by)  Report/Observation     Reported/Observed By  MD           Intervention Goal     Row Name 11/14/20 0812          Intervention Goal    General  Meet nutritional needs for age/condition;Improved nutrition related lab(s);Nutrition support treatment     PO  Advance diet;Establish PO;PO intake (%)     PO Intake %  50 %     TF/PN  Maintain TF/PN     Transition  PN to PO      Weight  Maintain weight         Nutrition Intervention     Row Name 11/14/20 0843          Nutrition Intervention    RD/Tech Action  Follow Tx progress;Recommend/ordered     Recommended/Ordered  PN         Nutrition Prescription     Row Name 11/14/20 0843          Nutrition Prescription PO    PO Prescription  Other (comment) Continue current diet order as tolerated     New PO Prescription Ordered?  No, recommended        Nutrition Prescription PN    PN Route  PICC     Dextrose Concentration (%)  20 %     Dextrose (Kcal)  489     Amino Acid Concentration (%)  5.0%     Amino Acid (gm)  36     PN Goal Rate (mL/hr)  30 mL/hr     PN Starting Rate (mL/hr)  30 mL/hr     PN Goal Volume (mL)  720 mL     PN Starting Volume (mL)  720 mL     Lipid Concentration (%)  20%     Lipid Volume (mL)  250 mL     Lipid Frequency  Daily     New PN Prescription Ordered?  Yes        Other Orders    Obtain Weight  Daily     Obtain Weight Ordered?  No, recommended     Supplement  Vitamin mineral supplement     Supplement Ordered?  No, recommended     Labs  Mg++;Phos;K+     Labs Ordered?  No, recommended     Other  Continue to monitor and replace electrolytes PRN         Education/Evaluation     Row Name 11/14/20 9989          Education    Education  Will Instruct as appropriate        Monitor/Evaluation    Monitor  Per protocol;I&O;PO intake;Pertinent labs;PN delivery/tolerance;Weight;Skin status           Electronically signed by:  Hanny Solis RD  11/14/20 08:46 EST

## 2020-11-14 NOTE — PLAN OF CARE
Goal Outcome Evaluation:  Plan of Care Reviewed With: patient, daughter  Progress: no change Interventions implemented as appropriate.

## 2020-11-15 NOTE — NURSING NOTE
Nursing assistant notified this nurse of low oxygenation.  Assessed patient, notified respiratory therapy and Dr. Underwood of need for further respiratory intervention.  Bipap placed, family notified to come see patient because of declining state.  Chest X Ray obtained. Patient grimacing and restless at about 0030, PRN pain medication administered.    0110 Four family members arrived as patient lost peripheral perfusion and experienced decreased lung sounds. Discussed progression of events with family.     Dr. Underwood arrived to speak with family and discuss prognosis.  Family remained at bedside.    0132- Cardiac monitor alarmed stating asystole.  This nurse and another went to room to assess patient.  Auscultation of breath sounds and heart sounds was negative.  Dr. Underwood arrived to assess patient.  Dr. Underwood pronounces patient expiration at 0135. Patient's family remained at bedside.  Left ~ 0200

## 2020-11-15 NOTE — PROGRESS NOTES
Case Management Discharge Note      Final Note:  in hospital    Provided Post Acute Provider List?: Yes  Post Acute Provider List: DME Supplier, Home Health  Provided Post Acute Provider Quality & Resource List?: Yes  Post Acute Provider Quality and Resource List: Home Health  Delivered To: Patient, Support Person  Method of Delivery: In person    Selected Continued Care - Discharged on 11/15/2020 Admission date: 2020 - Discharge disposition:     Destination    No services have been selected for the patient.              Durable Medical Equipment    No services have been selected for the patient.              Dialysis/Infusion    No services have been selected for the patient.              Home Medical Care    No services have been selected for the patient.              Therapy    No services have been selected for the patient.              Community Resources    No services have been selected for the patient.                       Final Discharge Disposition Code: 20 -

## 2020-11-15 NOTE — DISCHARGE SUMMARY
[facility]   DEATH DISCHARGE SUMMARY      Name:  Tunde Barrow   Age:  89 y.o.  Sex:  female  :  1931  MRN:  [id number]   Visit Number:  [id number]  Primary Care Physician:  Lorna Hagen MD  Date of Discharge:  11/15/2020  Admission Date:  2020    Pronounced  01:35 AM on 11/15/20    Death Discharge Diagnosis:  1. Acute hypoxic respiratory failure, with rapid decline, with progressive atelectasis and bilateral large pleural effusions in setting of #2  2. Acute small bowel obstruction with prolonged ileus, despite laparotomy and lysis of adhesions  3. Large Bilateral Pleural effusions  4. Debility  5. Left hip pain with Left superior rami fracture  6. GERD with esophagitis  7. Generalized anxiety  8. Chronic afib  9. Advanced age        Death Summary:  The patient is an 89-year-old lady admitted on 2020 with small bowel obstruction and underwent subsequent exploratory laparotomy on 2020 with reduction of small bowel volvulus, lysis of adhesion band, and small bowel resection.  The patient had a prolonged postoperative ileus.  She had NG tube placement until 11/10/2020 which she removed herself.  The patient was chronically debilitated but had still been functioning at home alone with her daughter living close by.  She was chronically oxygen dependent at night.  A repeat x-ray of the abdomen on 2020 showed persistent ileus.  She was initiated on peripheral parenteral nutrition on 2020 and ordered physical therapy as well.  A PICC line was placed on 2020.  The patient continued to have diffuse fatigue and malaise and left knee and left hip pain.  A pelvic x-ray performed on 2020 did show a left superior pubic ramus fracture.  The patient did not encounter any fall during her hospitalization but may have fallen prior to admission.  Orthopedic consult, Dr. Tse, was performed on 2020.  She had nonoperative  recommendations and physical therapy as tolerated.  Unfortunately, the patient's abdominal pain did not improve.  Her ileus had not improved.  She was allowed to eat some full liquids but was barely eating and so TPN was continued.  General surgery continue to follow her on a daily basis.  Earlier in her hospitalization she had bilateral opacities with atelectasis as well that had improved at one point.  Unfortunately, with her debility and multiple other medical issues, she further declined.  Yesterday, she had progressive fatigue and hypoxia and was more sleepy than previous.  Throughout her hospitalization, it was well documented that with her advanced age and underlying multiple comorbidities and the state of her small bowel obstruction that her prognosis was extremely poor.  Multiple conversations occurred between surgery with the patient's daughter regarding poor prognosis.      As the Nocturnist, I was called to patient's bedside for progressive hypoxia requiring bipap placement. Earlier today, she had altered mentation and progressively declined throughout the rest of the day. She had complained of nausea and abdominal pain this morning. She did not experience dyspnea or cough. She was DNR/DNI but with progressive hypoxia, placed on bipap.     Chest xray showed worsening atelectasis compared to previous, along with pleural effusions. After placement of bipap she initially had improved hypoxic respiratory failure with agitation and then progressive hypoxia later despite bipap therapy continuous at 100% FiO2. She had unfortunately worsening abdominal issues as above and with multiple co morbidities was unable to improve. Her family was called in after my evaluation and was at bedside and made COMFORT CARE measures. Morphine was provided as needed for pain and dyspnea.  Two daughters and their husbands were present. They had a short time to see their loved one and say prayers and visit.  The patient was rapidly  declining in regards to hypoxic respiratory failure with apnea and reduced respirations despite bipap placement.  Family remained at bedside and wanting to spend the short time they had left alone with patient. Prayers were offered. The patient rapidly declined further prior to ability to consult palliative care.     I was called to bedside and pronounced her  on 11-15-20 at 01:35 AM. She was without breath sounds, without pulse, without heartbeat. Family wished to say prayers privately. Nursing and I expressed condolences. Son in law expressed thanks.    Consults: General surgery, Orthopedics      Procedures Performed:    Procedure(s):  LAPAROTOMY EXPLORATORY WITH SMALL BOWEL RESECTION         Vital Signs:    Temp:  [97.5 °F (36.4 °C)-99.4 °F (37.4 °C)] 98.8 °F (37.1 °C)  Heart Rate:  [0-120] 0  Resp:  [17-28] 23  BP: ()/(50-90) 97/56    Physical Exam: prior to death      General Appearance:    Chronically ill appearing elderly lady. Agitation altered with reduced responsiveness   Head:    Normocephalic, without obvious abnormality   Eyes:            closed       Throat:   Moist. bipap placed   Neck:   No adenopathy, supple, trachea midline, no thyromegaly       Lungs:    intermittent apnea with bilateral rhonchi    Heart:    Regular rhythm and normal rate, normal S1 and S2, no            murmur, no gallop, no rub, no click   Breast Exam:    Deferred   Abdomen:     Soft, reduced bowel sounds, mild distention   Genitalia:    Deferred   Extremities:   Minimal movement of extremities with agitation, traceedema, no cyanosis   Pulses:   Pulses palpable and equal bilaterally   Skin:   No bleeding, bruising or rash; diffuse pallor   Lymph nodes:   No palpable adenopathy   Neurologic:   Reduced responsiveness         Pertinent Lab Results:     Results from last 7 days   Lab Units 20  0549 20  0623 20  0603   SODIUM mmol/L 134* 135* 136   POTASSIUM mmol/L 3.7 3.9 3.6   CHLORIDE mmol/L 103 104  105   CO2 mmol/L 22.7 20.6* 23.0   BUN mg/dL 48* 44* 41*   CREATININE mg/dL 1.05* 0.87 0.80   CALCIUM mg/dL 10.1 9.9 9.8   BILIRUBIN mg/dL 0.7 0.7 1.0   ALK PHOS U/L 121* 109 94   ALT (SGPT) U/L 12 14 15   AST (SGOT) U/L 18 19 21   GLUCOSE mg/dL 168* 160* 167*     Results from last 7 days   Lab Units 11/12/20  0603 11/11/20  0657 11/10/20  0556   WBC 10*3/mm3 14.03* 15.74* 12.39*   HEMOGLOBIN g/dL 12.2 12.3 12.6   HEMATOCRIT % 37.1 36.8 38.0   PLATELETS 10*3/mm3 192 214 214         Results from last 7 days   Lab Units 11/09/20  1038   TROPONIN T ng/mL <0.010                           Invalid input(s): USDES,  BLOODU, NITRITITE, BACT, EP  Pain Management Panel     There is no flowsheet data to display.              Pertinent Radiology Results:    Imaging Results (All)     Procedure Component Value Units Date/Time    XR Chest 1 View [[id number]] Resulted: 11/15/20 0030     Updated: 11/15/20 0031    XR Chest 1 View [[id number]] Collected: 11/13/20 1045     Updated: 11/13/20 1051    Narrative:      Portable chest     INDICATION: PICC line placement.     FINDINGS: Single frontal portable chest. Comparison with 11/12/2020. EKG  leads overlie the chest. Right-sided PICC line terminates in the distal  superior vena cava. No pneumothorax. Findings are again consistent with  bilateral effusions and infiltrates and/or atelectasis. Fluid overload  should be considered. These findings are slightly improved.       Impression:      1. Interval PICC line placement.  2. Slight improvement in aeration. Continued follow up recommended.     This report was finalized on 11/13/2020 10:49 AM by Oswaldo Leon MD.    XR Pelvis 1 or 2 View [[id number]] Collected: 11/12/20 2302     Updated: 11/12/20 2303    Narrative:      FINAL REPORT    TECHNIQUE:  2 views of the pelvis were obtained.    CLINICAL HISTORY:  . Left hip pain    COMPARISON:  CT abdomen and pelvis 11/4/2020    FINDINGS:  Again seen are postoperative changes of right hip  replacement.  The orthopedic hardware is intact and normally articulated.  There is a subtle fracture involving the lateral aspect of the  left superior pubic ramus.  This is new when compared to the  prior examination.  The left hip is normal.  No other fracture  of the pelvis is identified.  Dilated loops of small bowel are  seen in the lower abdomen and pelvis.      Impression:      Fracture of the left superior pubic ramus.  No other acute  abnormality.  Small bowel dilation.    Authenticated by Andrea Arvizu M.D. on 11/12/2020 11:02:03 PM    XR Abdomen 2+ VW with Chest 1 VW [[id number]] Collected: 11/12/20 1307     Updated: 11/12/20 1309    Narrative:      ACUTE ABDOMINAL SERIES     INDICATION: Ileus. History of bowel obstruction.     FINDINGS: Flat and upright views of the abdomen plus frontal view chest  compared with 11/09/2020. EKG leads overlie the chest. There appear to  be small bilateral pleural effusions with increased atelectasis or  infiltrates at the lung bases. No pneumothorax. No free air identified.  Moderate gaseous distention of the stomach. Persistent gas distended  loops of small bowel diffusely. There is stool and bowel gas in the  rectum and colon. No other interval change.       Impression:      Persistent gaseous distention of small bowel loops  diffusely. Gas and stool is seen to the level of the rectum. Findings  are favored to represent ileus. Bowel obstruction less likely.     Probable worsening of opacities at the lung bases likely due to  combination of small effusions and atelectasis or infiltrates from  pneumonia. Mild fluid overload not excluded. Follow-up recommended.     This report was finalized on 11/12/2020 1:07 PM by Oswaldo Leon MD.    XR Abdomen 2+ VW with Chest 1 VW [[id number]] Collected: 11/09/20 1602     Updated: 11/09/20 1605    Narrative:      ACUTE ABDOMINAL SERIES     INDICATION: Follow-up bowel obstruction.     FINDINGS: Flat and upright views of the abdomen plus  frontal view the  chest. Comparison with abdomen dated 11/08/2020. Correlation also made  with chest radiograph dated 11/04/2020. Lung volumes are diminished with  some mild opacities in the lung bases likely due to atelectasis.  Infiltrates from pneumonia or small effusions not excluded. Scattered  vascular calcifications. Scattered calcifications, likely due to old  granulomatous disease. No pneumothorax. No obvious free air identified.  Moderate gaseous distention of the stomach and small bowel diffusely.  There is probably some gas in the colon. The findings are overall  similar to the recent study. Surgical clips are present in the abdomen  and pelvis, as well as skin staples.       Impression:      1. Diffuse gaseous distention of small bowel with some gas in the colon.  As described previously, findings could represent ileus but bowel  obstruction not excluded.  2. Patchy opacities in the lung bases favored to represent atelectasis  but continued follow-up recommended.     This report was finalized on 11/9/2020 4:03 PM by Oswaldo Leon MD.    XR Abdomen KUB [[id number]] Collected: 11/08/20 1100     Updated: 11/08/20 1132    Narrative:      PROCEDURE: XR ABDOMEN KUB-     INDICATION:  Ileus; K56.609-Unspecified intestinal obstruction,  unspecified as to partial versus complete obstruction; N28.9-Disorder of  kidney and ureter, unspecified; E87.2-Acidosis; K56.609-Unspecified  intestinal obstruction, unspecified as to partial versus complete  obstruction     COMPARISON:  1 day prior.     FINDINGS:  A supine view of the abdomen was obtained.  Mild to  moderately dilated small bowel loops again noted from the left upper  quadrant to the pelvis, similar to prior. More air is identified in the  cecum and the rectum. Appearance may represent partial small bowel  obstruction or perhaps ileus. Continued follow-up recommended. Total  right hip arthroplasty noted..  There are no pathologic calcifications.   No acute osseous  abnormality is identified.       Impression:      No significant change in small bowel distention but  increased air in the cecum and rectum as described.     This report was finalized on 11/8/2020 11:30 AM by Dana Valencia MD.    XR Abdomen 1 View [[id number]] Collected: 11/07/20 1610     Updated: 11/07/20 1622    Narrative:      PROCEDURE: XR ABDOMEN 1 VW-     INDICATION:  ileus; K56.609-Unspecified intestinal obstruction,  unspecified as to partial versus complete obstruction; N28.9-Disorder of  kidney and ureter, unspecified; E87.2-Acidosis; K56.609-Unspecified  intestinal obstruction, unspecified as to partial versus complete  obstruction     COMPARISON:  CT 11/05/2020.     FINDINGS:  A supine view of the abdomen was obtained.  There are  moderately dilated bowel loops measuring up to 48 mm; this is mildly  increased from prior exam when they measured up to 41 mm. Small amount  of air appears to be located in the cecum. Minimal air noted in the  rectum. Findings suggest at least a high-grade partial small bowel  obstruction, mildly worsened from prior. Midline skin staples noted.  Total right hip arthroplasty present. Osteopenia identified..  There are  no pathologic calcifications.  No acute osseous abnormality is  identified.       Impression:      Mild interval increase in small bowel distention compared  to 11/05/2020.        This report was finalized on 11/7/2020 4:20 PM by Dana Valencia MD.    CT Abdomen Pelvis With Contrast [[id number]] Collected: 11/05/20 1311     Updated: 11/05/20 1316    Narrative:      PROCEDURE: CT ABDOMEN PELVIS W CONTRAST-     HISTORY:  Bowel obstruction suspected; K56.609-Unspecified intestinal  obstruction, unspecified as to partial versus complete obstruction;  N28.9-Disorder of kidney and ureter, unspecified; E87.2-Acidosis     COMPARISON:  None .     TECHNIQUE: Multiple axial CT images were obtained from the lung bases  through the pubic symphysis following the administration of  Isovue 300  and oral contrast.      FINDINGS:      ABDOMEN: There are small bilateral pleural effusions and dependent  atelectasis. The heart is normal in size. The liver is diffusely  hypodense consistent with fatty infiltration. The spleen is  unremarkable. No adrenal mass is present.  The pancreas is normal. The  kidneys are normal. The aorta is normal in caliber. There is free fluid  present in the right upper quadrant. There is no free air. A nasogastric  tube is in place with the tip in the body of the stomach. Oral contrast  is present within the stomach. There is mild wall thickening involving  the antrum. There are multiple dilated loops of small bowel without a  definitive transition point unchanged compared to the prior exam. The  distal ileum appears collapsed.     PELVIS: The appendix is not identified. There are colonic diverticula  without CT evidence of diverticulitis. The patient is status post  hysterectomy The urinary bladder is unremarkable. There is no  significant free fluid or adenopathy.       Impression:      1. Dilated loops of small bowel without a discrete transition point  unchanged compared to the prior exam concerning for a small bowel  obstruction.  2. Wall thickening involving the antrum of the stomach which may reflect  gastritis or peptic ulcer disease.  3. Colonic diverticula without CT evidence of diverticulitis.     709.10 mGy.cm        This study was performed with techniques to keep radiation doses as low  as reasonably achievable (ALARA). Individualized dose reduction  techniques using automated exposure control or adjustment of mA and/or  kV according to the patient size were employed.      This report was finalized on 11/5/2020 1:14 PM by Rebecca Chung M.D..    XR Chest 1 View [[id number]] Collected: 11/05/20 1005     Updated: 11/05/20 1157    Narrative:      PROCEDURE: XR CHEST 1 VW-        HISTORY: NG placement     COMPARISON: None.     FINDINGS: The heart is normal  in size. The mediastinum is unremarkable.  There is left basilar atelectasis. There is a calcified granuloma in the  right midlung. There is no pneumothorax. There are no acute osseous  abnormalities. The NG tube is coiled in the distal esophagus.       Impression:      1. NG tube is coiled in the distal esophagus.  2. Left basilar atelectasis.           Images were reviewed, interpreted, and dictated by Dr. Rebecca Chung M.D.  Transcribed by Mayela Vazquez PA-C.     This report was finalized on 11/5/2020 11:55 AM by Rebecca Chung M.D..    XR Abdomen KUB [[id number]] Collected: 11/05/20 1127     Updated: 11/05/20 1129    Narrative:      PROCEDURE: XR ABDOMEN KUB-     HISTORY: ngt placement; K56.609-Unspecified intestinal obstruction,  unspecified as to partial versus complete obstruction; N28.9-Disorder of  kidney and ureter, unspecified; E87.2-Acidosis     COMPARISON: None.     FINDINGS: An AP view of the abdomen and pelvis demonstrates a  nasogastric tube in place with the tip in the body of the stomach. There  are dilated loops of small bowel unchanged compared to the prior CT  examination.       Impression:      Nasogastric tube in place with the tip in the body of the  stomach.           This report was finalized on 11/5/2020 11:27 AM by Rebecca Chung M.D..    CT Abdomen Pelvis Without Contrast [[id number]] Collected: 11/04/20 2141     Updated: 11/04/20 2142    Narrative:      FINAL REPORT    TECHNIQUE:  Axial images through the abdomen and pelvis were performed  without contrast. This study was performed with techniques to  keep radiation doses as low as reasonably achievable, (ALARA).  Individualized dose reduction techniques using automated  exposure control or adjustment of mA and/or kV according to the  patient's size were employed.    CLINICAL HISTORY:  upper abd pain, n/v    FINDINGS:  ABDOMEN: The lung bases are clear.  The heart size is normal.  Patient is status post cholecystectomy.   Limited images of the  liver are unremarkable.  The spleen is normal.  No adrenal mass  is identified.  The aorta is normal in caliber.  There is mild,  diffuse ascites.  There is no nephrolithiasis.  There is no  hydronephrosis.  Small bowel loops are dilated and fluid-filled  measuring up to 30 mm.  PELVIS: Detail is limited from right hip  hardware.  Distal small bowel is decompressed with transition  point seen in the low right pelvis. The appendix is not  identified.  The urinary bladder is decompressed.  There are  diverticula of the sigmoid colon without evidence of  diverticulitis.  There is no significant free fluid or  adenopathy.  Severe degenerative disc disease is seen of the  lower lumbar spine.      Impression:      Small bowel obstruction with transition point seen in the right  lower quadrant.    Authenticated by Jae Mora MD on 11/04/2020 09:41:48 PM               Claudia Underwood DO  11/15/20  06:45 EST        Time:  I spent 35 minutes with patient and family for  death discharge and planning.

## 2020-11-15 NOTE — PROGRESS NOTES
NOCTURNIST NOTE:     Patient having progressive hypoxic respiratory failure overnight. Bipap will be placed. She is DNR and has very poor prognosis. Get chest xray. Consider aspiration vs fluid overload. BP systolic only 90s so hold off on lasix.       Patient hypoxia has worsened. 4 family members have come to bedside. Comfort care measures will be maintained. Two daughters and their spouses are here to say goodbyes, as I expect imminent death with progressive respiratory failure and hypoxia.
